# Patient Record
Sex: FEMALE | Race: WHITE | NOT HISPANIC OR LATINO
[De-identification: names, ages, dates, MRNs, and addresses within clinical notes are randomized per-mention and may not be internally consistent; named-entity substitution may affect disease eponyms.]

---

## 2017-09-17 ENCOUNTER — HEALTH MAINTENANCE LETTER (OUTPATIENT)
Age: 32
End: 2017-09-17

## 2019-03-06 ENCOUNTER — APPOINTMENT (RX ONLY)
Dept: URBAN - METROPOLITAN AREA CLINIC 303 | Facility: CLINIC | Age: 34
Setting detail: DERMATOLOGY
End: 2019-03-06

## 2019-03-06 DIAGNOSIS — B07.0 PLANTAR WART: ICD-10-CM

## 2019-03-06 DIAGNOSIS — L81.4 OTHER MELANIN HYPERPIGMENTATION: ICD-10-CM

## 2019-03-06 DIAGNOSIS — D22 MELANOCYTIC NEVI: ICD-10-CM

## 2019-03-06 DIAGNOSIS — L40.0 PSORIASIS VULGARIS: ICD-10-CM | Status: IMPROVED

## 2019-03-06 PROBLEM — E03.9 HYPOTHYROIDISM, UNSPECIFIED: Status: ACTIVE | Noted: 2019-03-06

## 2019-03-06 PROBLEM — M12.9 ARTHROPATHY, UNSPECIFIED: Status: ACTIVE | Noted: 2019-03-06

## 2019-03-06 PROBLEM — Z92.3 PERSONAL HISTORY OF IRRADIATION: Status: ACTIVE | Noted: 2019-03-06

## 2019-03-06 PROBLEM — D22.72 MELANOCYTIC NEVI OF LEFT LOWER LIMB, INCLUDING HIP: Status: ACTIVE | Noted: 2019-03-06

## 2019-03-06 PROBLEM — D22.5 MELANOCYTIC NEVI OF TRUNK: Status: ACTIVE | Noted: 2019-03-06

## 2019-03-06 PROCEDURE — ? ADDITIONAL NOTES

## 2019-03-06 PROCEDURE — ? COUNSELING

## 2019-03-06 PROCEDURE — 99214 OFFICE O/P EST MOD 30 MIN: CPT

## 2019-03-06 PROCEDURE — ? PRESCRIPTION MEDICATION MANAGEMENT

## 2019-03-06 PROCEDURE — ? PHOTO-DOCUMENTATION

## 2019-03-06 PROCEDURE — ? SUNSCREEN RECOMMENDATIONS

## 2019-03-06 ASSESSMENT — LOCATION DETAILED DESCRIPTION DERM
LOCATION DETAILED: INFERIOR THORACIC SPINE
LOCATION DETAILED: LEFT MEDIAL PLANTAR MIDFOOT
LOCATION DETAILED: RIGHT ELBOW
LOCATION DETAILED: LEFT PROXIMAL MEDIAL CALF
LOCATION DETAILED: LEFT ELBOW

## 2019-03-06 ASSESSMENT — LOCATION ZONE DERM
LOCATION ZONE: LEG
LOCATION ZONE: ARM
LOCATION ZONE: TRUNK
LOCATION ZONE: FEET

## 2019-03-06 ASSESSMENT — LOCATION SIMPLE DESCRIPTION DERM
LOCATION SIMPLE: UPPER BACK
LOCATION SIMPLE: RIGHT ELBOW
LOCATION SIMPLE: LEFT CALF
LOCATION SIMPLE: LEFT ELBOW
LOCATION SIMPLE: LEFT PLANTAR SURFACE

## 2019-03-06 NOTE — PROCEDURE: PHOTO-DOCUMENTATION
Photo Preface (Leave Blank If You Do Not Want): Photographs were obtained today
Detail Level: Detailed
Details (Free Text): 5mm triangular shaped brown macule

## 2019-03-06 NOTE — PROCEDURE: PRESCRIPTION MEDICATION MANAGEMENT
Render In Strict Bullet Format?: No
Continue Regimen: Salicylic acid 6% cream, Clobetasol .05% cream bid on elbows prn
Detail Level: Detailed

## 2020-06-17 ENCOUNTER — TRANSFERRED RECORDS (OUTPATIENT)
Dept: HEALTH INFORMATION MANAGEMENT | Facility: CLINIC | Age: 35
End: 2020-06-17

## 2020-06-17 LAB
HPV ABSTRACT: NORMAL
HPV ABSTRACT: NORMAL
PAP-ABSTRACT: NORMAL
PAP-ABSTRACT: NORMAL

## 2020-06-22 ENCOUNTER — TRANSFERRED RECORDS (OUTPATIENT)
Dept: HEALTH INFORMATION MANAGEMENT | Facility: CLINIC | Age: 35
End: 2020-06-22

## 2020-06-22 LAB
ALT SERPL-CCNC: 21 U/L
AST SERPL-CCNC: 35 U/L (ref 16–55)
CHOLEST SERPL-MCNC: 152 MG/DL
CREAT SERPL-MCNC: 0.91 MG/DL (ref 0.52–1.04)
GFR SERPL CREATININE-BSD FRML MDRD: >60 ML/MIN/1.73M2
GLUCOSE SERPL-MCNC: 86 MG/DL (ref 70–199)
HDLC SERPL-MCNC: 40 MG/DL (ref 40–60)
LDLC SERPL CALC-MCNC: 96 MG/DL
POTASSIUM SERPL-SCNC: 4.5 MMOL/L (ref 3.5–5.1)
TRIGL SERPL-MCNC: 80 MG/DL
TSH SERPL-ACNC: 3.31 MIU/L (ref 0.47–4.68)

## 2020-07-06 RX ORDER — CHLORAL HYDRATE 500 MG
1 CAPSULE ORAL DAILY
COMMUNITY

## 2020-07-06 RX ORDER — PRENATAL VIT/IRON FUM/FOLIC AC 27MG-0.8MG
1 TABLET ORAL DAILY
COMMUNITY
End: 2020-12-11

## 2020-07-06 RX ORDER — LEVOTHYROXINE SODIUM 100 UG/1
188 TABLET ORAL DAILY
COMMUNITY
End: 2020-10-14

## 2020-07-06 NOTE — PROGRESS NOTES
"Kaitlyn Paul is a 34 year old female who is being evaluated via a billable video visit.      The patient has been notified of following:     \"This video visit will be conducted via a call between you and your physician/provider. We have found that certain health care needs can be provided without the need for an in-person physical exam.  This service lets us provide the care you need with a video conversation.  If a prescription is necessary we can send it directly to your pharmacy.  If lab work is needed we can place an order for that and you can then stop by our lab to have the test done at a later time.    Video visits are billed at different rates depending on your insurance coverage.  Please reach out to your insurance provider with any questions.    If during the course of the call the physician/provider feels a video visit is not appropriate, you will not be charged for this service.\"    Patient has given verbal consent for Video visit? Yes    How would you like to obtain your AVS? Anthonyhart     Patient would like the video invitation sent by: Send to e-mail at: [email or web address] (**[email or web address]**)      Video-Visit Details    Type of service:  Video Visit    Distant Location (provider location):  [facility] ENDOCR[facility]     Hallie Jones MA          "

## 2020-07-07 ENCOUNTER — VIRTUAL VISIT (OUTPATIENT)
Dept: ENDOCRINOLOGY | Facility: CLINIC | Age: 35
End: 2020-07-07
Payer: COMMERCIAL

## 2020-07-07 DIAGNOSIS — C73 PAPILLARY THYROID CARCINOMA (H): Primary | ICD-10-CM

## 2020-07-07 DIAGNOSIS — E89.0 POST-SURGICAL HYPOTHYROIDISM: ICD-10-CM

## 2020-07-07 NOTE — LETTER
Date:July 8, 2020      Patient was self referred, no letter generated. Do not send.        [facility] Health Information

## 2020-07-07 NOTE — PROGRESS NOTES
Endocrine Consult Video visit note- precharting    Attending Assessment/Plan :     1.  Papillary thyroid carcinoma, 3.2 cm left with ETE, + 5 LN positive. She has been treated with near total Tx and CND, 103 mCi 131.    Neck US to be done at [facility] - could be done at the same time as the labs in early Sept    Addendum   9/14/2020 neck US as reviewed by me:   Left level 4 low 0.6 x 0.5 x 0.6 cm     2.  Post surgical hypothyroidism.  Treat to target TSH < 0.4.   Unstable.  Recent increased LT4 dose from 175 to 188 mcg/day.  Repeat labs early September, 2020    Review of the EMR.  She did not get the early Sept labs as advised.  Instead we have the folloing  7/24/2020 [place]  TSH 1.567    3   Post partum from miscarriage within the last 6 months.  I have counseled her to let us know immediately if she conceives again.      Due to the COVID 19 pandemic this visit was a telephone /video visit in order to help prevent spread of infection in this high risk patient and the general population. The patient gave verbal consent for the visit today.    Start time 1805  Stop time 1822  Total time 17 minutes     Chief complaint:  Kaitlyn is a 34 year old female seen in consultation at the request of Dr Martina Pryor (endocrinologist, ) and Mariya Luong  For thyroid cancer. I have reviewed Care Everywhere including  [facility] and [facility] lab reports, imaging reports and provider notes as indicated.      HISTORY OF PRESENT ILLNESS  She has previously been getting thyroid cancer care by Dr Paulo Pryor  in [place], [place] at [facility]. She recently moved to [place].      The thyroid cancer treatment has been as follows:   2/4/14 Near total thyroidectomy with central lymph node dissection by Dr Sandra Mims at .  Surgical path showed 3.2 cm PTC left with ETE but no LV invasion.  5/21 LN were positive , larges tumor deposit in LN 0.4 cm.      4/11/14 103 mCi 131I  The most recent labs were obtained in late June in [place], just  before she moved here. IN response to the TSH of 2.0, the LT4 dose increased from 175 to 188 (1q00 + 88 mcg) one week ago.    We have the following labs  1/30/14 25OHD 18, PTH 29, Ca 9.4,   2/4/14 PTH 11  2/6/14 PTH 14  2/20/14: PTH 14, Ca 9.5  4/11/14: Tg 1.8, LOR < 1, .7, PTH 42  Radioiodine 103 mCi  12/4/14: Tg 0.3, LOR < 1, TSH 21.8  8/1/16: Tg < 0.1, TSH 0.44  4/29/17: Tg 0.1, LOR < 1, TSH 0.27  9/14/18 Tg < 0.1, LOR < 1, TPO 32  3/29/19: 25OHD 36  9/6/19 Tg < 0.1, LOR < 1, TSH 0.23, free  T4 2.03  9/17/19 TSH 0.28  6/22/20 TSH 3.31  6/24/2020 Tg 0.1 (Access), LOR < 1 (Access), TSH 2.01, free T4 1.73    Imaging  12/11/13 CT Doc  12/20/13 US neck  4/11/14 103 mCi 131I  4/21/14 131I post therapy TBS: negative  12/4/14 4.06 mCi 123I TBS: negative  8/23/16 US neck  8/14/17 US thyroid  8/20/18 US neck   8/13/19 thyroid US [facility]    REVIEW OF SYSTEMS  Miscarriage x 4 in 2 years-- most recent 1/2020  Currently not trying to conceive - not using birth contorl.    10 system ROS otherwise as per the HPI or negative    Past Medical History  Past Medical History:   Diagnosis Date     Dry eye      Miscarriage     x 4     Papillary thyroid carcinoma (H) 02/2014     Post-surgical hypothyroidism 02/2014     Past Surgical History:   Procedure Laterality Date     C NONSPECIFIC PROCEDURE  2007    lipoma excision from her back     C REPAIR CRUCIATE LIGAMENT,KNEE  1999    left     central neck dissection  02/04/2014     HC KNEE SCOPE,MED/LAT MENISECTOMY  2009     HC REMOVAL GALLBLADDER  2005    lap      near total thyroidectomy  02/04/2014       Medications  Current Outpatient Medications   Medication Sig Dispense Refill     levothyroxine (SYNTHROID/LEVOTHROID) 100 MCG tablet Take 188 mcg by mouth daily        Omega-3 1000 MG capsule Take 1 g by mouth daily       Prenatal Vit-Fe Fumarate-FA (PRENATAL MULTIVITAMIN W/IRON) 27-0.8 MG tablet Take 1 tablet by mouth daily         Allergies  Allergies   Allergen Reactions      Sulfa Drugs      Rash as a child       Family History  family history includes Cardiovascular in her maternal grandfather, maternal grandmother, and paternal grandfather; Cerebrovascular Disease in her maternal grandmother; Gastrointestinal Disease in her paternal grandmother; Graves' disease in her mother.    Social History  Social History     Tobacco Use     Smoking status: Never Smoker   Substance Use Topics     Alcohol use: No     Drug use: No     Just moved here from [place]    Physical Exam  There were no vitals taken for this visit.  There is no height or weight on file to calculate BMI.  GENERAL :  In no apparent distress  EYES: Eyes grossly normal to inspection  RESP: No audible wheeze, cough, or visible cyanosis.  No visible retractions or increased work of breathing.    SKIN: Visible skin clear. No significant rash, abnormal pigmentation or lesions.  NEURO: Cranial nerves grossly intact.  Mentation and speech appropriate .  PSYCH: Mentation appears normal, affect normal/bright, judgement and insight intact, normal speech and appearance well-groomed.     DATA REVIEW    ADDENDUM ADDENDED REPORT (02/10/14): THIS ADDENDUM REPORT SUPERSEDES THE REPORT ISSUED 02/04/14. THE PURPOSE OF THIS ADDENDUM IS TO ADD THE FROZEN SECTION DIAGNOSIS.  THE FINAL DIAGNOSIS IS UNCHANGED.   FINAL DIAGNOSIS  A) Parathyroid, right, excisional biopsy:  - Thyroid tissue, no parathyroid present  B) Thyroid and lymph nodes, total thyroidectomy and central neck dissection:  - Papillary thyroid carcinoma of left lobe, maximum dimension 3.2 cm  - Surgical margins free of tumor; but tumor within 0.1 cm from inked margins  - Tumor does not spread to right lobe or isthmus  - Extrathyroid extension present  - Lymphovascular invasion not present  - Five of 21 lymph nodes positive for carcinoma (5/21);    largest tumor deposit 0.4 cm with no extranodal extension  - Remaining thyroid parenchyma with lymphocytic thyroiditis  - Please see  "microscopic description for synoptic report    CLINICAL HISTORY  28 year-old female with papillary thyroid carcinoma.    MICROSCOPIC DESCRIPTION  Synoptic Report:    Procedure: Total thyroidectomy  Received: In formalin  Specimen Integrity: Intact  Specimen Size:   Right lobe: 4.6 x 2.2 x 1.1 cm   Left lobe: 5.1 x 2.5 x 1.8 cm   Isthmus: 1.3 x 1.4 x 0.4 cm   Central compartment: 3.7 x 2.4 x 0.5 cm  Specimen Weight: 23g    Tumor Focality: Unifocal   Tumor Laterality: Left Lobe  Tumor Size   Greatest dimension: 3.2 cm   Additional dimensions: 1.6 x 1.6 cm  Histologic Type: Papillary carcinoma, Classical  Architecture: Classical  Cytomorphology: Classical  Histologic Grade:  G1: Well differentiated  Margins: Margins uninvolved by carcinoma  Distance of invasive carcinoma to closest margin: less than 1 mm  Tumor Capsule: None  Lymph-Vascular Invasion: Not identified  Perineural Invasion: Not identified  Extrathyroidal Extension: Present    Pathologic Staging (pTNM)  Primary Tumor (pT)   pT3: Focal extrathyroid extension  Regional Lymph Nodes (pN)   pN1: Positive   Number examined: 21   Number involved: 5   Largest size of tumor deposit: 0.4 cm   Extranodal extension: Not present  Distant Metastasis (pM): Not applicable    Additional Pathologic Findings: Lymphocytic thyroiditis  GROSS DESCRIPTION  A) The specimen is received fresh for intraoperative consultation labeled \"Kaitlyn Paul/right parathyroid\" and consists of one fragment of red-tan tissue measuring 0.2 x 0.1 x 0.1 cm. The entirety of the tissue is submitted for frozen section analysis which was interpreted as thyroid tissue. The entirety of the frozen section remnants are submitted for permanent analysis in cassette (FSA1).    B) The specimen is received in formalin labeled \"Kaitlyn Paul/thyroid and central neck dissection\" and consists of one thyroid measuring 6.0 x 5.3 x 1.8 cm. The right lobe measures 4.6 x 2.2 x 1.1 cm. The left lobe " measures 5.1 x 2.5 x 1.8 cm. The isthmus measures 1.3 x 1.4 x 0.4 cm. The left lobe appears to have a dominant cystic cavity measuring approximately 2.8 x 2.4 x 1.8 cm. No nodules are externally appreciated on the right lobe. The thyroid has stitches; however it is not indicated what the stitches designate. The thyroid weighs 23 g. The anterior is inked in green and the posterior is inked in purple. The thyroid is serially sectioned from right to left revealing homogenous dark red thyroid parenchyma within the right lobe. The isthmus was devoid of gross abnormality. The left lobe demonstrated an ill-defined nodule measuring approximately 3.2 x 1.6 by 1.6 cm. This nodule contains what appear to be thick hemorrhagic cavities as well as darker firm tan areas. There does not appear to be a capsule around the nodule or any part of it. The nodule is extremely friable. Representative sections of the right lobe and isthmus are submitted. The entirety of the left lobe   nodule is submitted. There is also a piece of red-tan soft tissue measuring 3.7 x 2.4 x 0.5 cm. There appears to be made up of lymph nodes and fibroconnective tissue. The tissue is thoroughly searched for lymph nodes and submitted in its entirety.    (B1-B4) Representative right lobe  (B5) Isthmus  (B6-B11) Entirety of left lobe nodule  (B12) Uninvolved left lobe parenchyma  (B13) 3 presumptive lymph nodes whole  (B14) 2 presumptive lymph nodes, whole  (B15) 3 presumptive lymph nodes, whole  (B16) 3 presumptive lymph nodes, whole  (B17) 2 presumptive lymph nodes, whole  LJP    INTRAOPERATIVE DIAGNOSIS  (FSA1) Right parathyroid, biopsy: thyroid tissue  Intra-Operative Diagnosis Reviewed and Interpreted By:  MD Ameena Henry MD   'I certify that (1) all services on this form were rendered and are hereby approved for billing, (2) all specimens/slides have been examined / reviewed, (3) the medical record has been documented for these  services, and (4) the rendering of the services and the documentation in the medical record are in accordance with UPI guidelines.'  Final Diagnosis Reviewed and Interpreted by:  ELECTRONICALLY SIGNED  Yareli William MD, PhD  Benjamin Mera MD   Pathologist - [id number]  Resident   Date:  02/10/2014  Surgical Pathology  [facility] Department of Pathology  [phone] (Phone)   [phone] (Fax)    EXAMINATION: ULTRASOUND THYROID    DATE: 8/13/2019.    INDICATION: Any concerning mass or lymph node?  History of papillary thyroid cancer, status post thyroidectomy.    TECHNIQUE: Grayscale and color Doppler images of the neck were obtained.    COMPARISON: Prior ultrasound on 8/20/2018.    FINDINGS:    The thyroid gland is surgically absent. No abnormal soft tissue or nodularity is noted in the thyroidectomy beds.    Several small benign lymph nodes are seen along the bilateral neck chains.    Exam: US HEAD NECK SOFT TISSUE, 9/16/2020     Indication: Papillary thyroid carcinoma (H) [C73 (ICD-10-CM)];  Post-surgical hypothyroidism     Comparison: None     Findings:   Post-thyroidectomy. A 4 x 7  mm hyperechogenic area in the left  thyroidectomy bed, likely represents residual thyroid tissue versus  postsurgical scarring.     Largest lymph nodes in the neck as described below;  *  1.3 x 0.7 x 2.3 cm ovoid right level 2 lymph nodes with visible  fatty hilum, favored as reactive.   *  1.4 x 0.5 x 2.1 cm ovoid left level 2 lymph node.  *  0.6 x 0.5 x 0.6 cm ovoid left level 4 lymph node.                                                                      Impression:   1. A 4 x 7  mm hyperechogenic area in the left thyroidectomy bed,  likely represents postsurgical changes versus fatty-replaced lymph  node. No suspicious nodules or lymph nodes in the thyroidectomy bed.  2. Scattered cervical lymph nodes as noted above, likely reactive. No  suspicious lymph node is noted. Attention on follow-up.     I have personally  reviewed the examination and initial interpretation  and I agree with the findings.     ZEFERINO HENDRIX MD

## 2020-07-07 NOTE — LETTER
"7/7/2020       RE: Kaitlyn Paul  [address] [place] [id number]     Dear Colleague,    Thank you for referring your patient, Kaitlyn Paul, to the [facility] ENDOCRINOLOGY at [facility]. Please see a copy of my visit note below.    Kaitlyn Paul is a 34 year old female who is being evaluated via a billable video visit.      The patient has been notified of following:     \"This video visit will be conducted via a call between you and your physician/provider. We have found that certain health care needs can be provided without the need for an in-person physical exam.  This service lets us provide the care you need with a video conversation.  If a prescription is necessary we can send it directly to your pharmacy.  If lab work is needed we can place an order for that and you can then stop by our lab to have the test done at a later time.    Video visits are billed at different rates depending on your insurance coverage.  Please reach out to your insurance provider with any questions.    If during the course of the call the physician/provider feels a video visit is not appropriate, you will not be charged for this service.\"    Patient has given verbal consent for Video visit? Yes    How would you like to obtain your AVS? MyChart     Patient would like the video invitation sent by: Send to e-mail at: [email or web address] (**Amwell**)      Video-Visit Details    Type of service:  Video Visit    Distant Location (provider location):  [facility] ENDOCRINOLOGY     Hallie Jones MA            Endocrine Consult Video visit note- precharting    Attending Assessment/Plan :     1.  Papillary thyroid carcinoma, 3.2 cm left with ETE, + 5 LN positive. She has been treated with near total Tx and CND, 103 mCi 131.    Neck US to be done at [facility] - could be done at the same time as the labs in early Sept    2.  Post surgical hypothyroidism.  Treat to target TSH < " 0.4.   Unstable.  Recent increased LT4 dose from 175 to 188 mcg/day.  Repeat labs early September, 2020    3   Post partum from miscarriage within the last 6 months.  I have counseled her to let us know immediately if she conceives again.      Due to the COVID 19 pandemic this visit was a telephone /video visit in order to help prevent spread of infection in this high risk patient and the general population. The patient gave verbal consent for the visit today.    Start time 1805  Stop time 1822  Total time 17 minutes     Chief complaint:  Kaitlyn is a 34 year old female seen in consultation at the request of Dr Martina Pryor (endocrinologist, ) and Mariya Luong  For thyroid cancer. I have reviewed Care Everywhere including  [facility] and [facility] lab reports, imaging reports and provider notes as indicated.      HISTORY OF PRESENT ILLNESS  She has previously been getting thyroid cancer care by Dr Paulo Pryor  in [place], [place] at [facility]. She recently moved to [place].      The thyroid cancer treatment has been as follows:   2/4/14 Near total thyroidectomy with central lymph node dissection by Dr Sandra Mims at .  Surgical path showed 3.2 cm PTC left with ETE but no LV invasion.  5/21 LN were positive , larges tumor deposit in LN 0.4 cm.      4/11/14 103 mCi 131I  The most recent labs were obtained in late June in [place], just before she moved here. IN response to the TSH of 2.0, the LT4 dose increased from 175 to 188 (1q00 + 88 mcg) one week ago.    We have the following labs  1/30/14 25OHD 18, PTH 29, Ca 9.4,   2/4/14 PTH 11  2/6/14 PTH 14  2/20/14: PTH 14, Ca 9.5  4/11/14: Tg 1.8, LOR < 1, .7, PTH 42  Radioiodine 103 mCi  12/4/14: Tg 0.3, LOR < 1, TSH 21.8  8/1/16: Tg < 0.1, TSH 0.44  4/29/17: Tg 0.1, LOR < 1, TSH 0.27  9/14/18 Tg < 0.1, LOR < 1, TPO 32  3/29/19: 25OHD 36  9/6/19 Tg < 0.1, LOR < 1, TSH 0.23, free  T4 2.03  9/17/19 TSH 0.28  6/22/20 TSH 3.31  6/24/2020 Tg 0.1 (Access), LOR < 1  (Access), TSH 2.01, free T4 1.73    Imaging  12/11/13 CT Doc  12/20/13 US neck  4/11/14 103 mCi 131I  4/21/14 131I post therapy TBS: negative  12/4/14 4.06 mCi 123I TBS: negative  8/23/16 US neck  8/14/17 US thyroid  8/20/18 US neck   8/13/19 thyroid US [facility]    REVIEW OF SYSTEMS  Miscarriage x 4 in 2 years-- most recent 1/2020  Currently not trying to conceive - not using birth contorl.    10 system ROS otherwise as per the HPI or negative    Past Medical History  Past Medical History:   Diagnosis Date     Dry eye      Miscarriage     x 4     Papillary thyroid carcinoma (H) 02/2014     Post-surgical hypothyroidism 02/2014     Past Surgical History:   Procedure Laterality Date     C NONSPECIFIC PROCEDURE  2007    lipoma excision from her back     C REPAIR CRUCIATE LIGAMENT,KNEE  1999    left     central neck dissection  02/04/2014     HC KNEE SCOPE,MED/LAT MENISECTOMY  2009     HC REMOVAL GALLBLADDER  2005    lap      near total thyroidectomy  02/04/2014       Medications  Current Outpatient Medications   Medication Sig Dispense Refill     levothyroxine (SYNTHROID/LEVOTHROID) 100 MCG tablet Take 188 mcg by mouth daily        Omega-3 1000 MG capsule Take 1 g by mouth daily       Prenatal Vit-Fe Fumarate-FA (PRENATAL MULTIVITAMIN W/IRON) 27-0.8 MG tablet Take 1 tablet by mouth daily         Allergies  Allergies   Allergen Reactions     Sulfa Drugs      Rash as a child       Family History  family history includes Cardiovascular in her maternal grandfather, maternal grandmother, and paternal grandfather; Cerebrovascular Disease in her maternal grandmother; Gastrointestinal Disease in her paternal grandmother; Graves' disease in her mother.    Social History  Social History     Tobacco Use     Smoking status: Never Smoker   Substance Use Topics     Alcohol use: No     Drug use: No     Just moved here from [place]    Physical Exam  There were no vitals taken for this visit.  There is no height or weight on file to  calculate BMI.  GENERAL :  In no apparent distress  EYES: Eyes grossly normal to inspection  RESP: No audible wheeze, cough, or visible cyanosis.  No visible retractions or increased work of breathing.    SKIN: Visible skin clear. No significant rash, abnormal pigmentation or lesions.  NEURO: Cranial nerves grossly intact.  Mentation and speech appropriate .  PSYCH: Mentation appears normal, affect normal/bright, judgement and insight intact, normal speech and appearance well-groomed.     DATA REVIEW    ADDENDUM ADDENDED REPORT (02/10/14): THIS ADDENDUM REPORT SUPERSEDES THE REPORT ISSUED 02/04/14. THE PURPOSE OF THIS ADDENDUM IS TO ADD THE FROZEN SECTION DIAGNOSIS.  THE FINAL DIAGNOSIS IS UNCHANGED.   FINAL DIAGNOSIS  A) Parathyroid, right, excisional biopsy:  - Thyroid tissue, no parathyroid present  B) Thyroid and lymph nodes, total thyroidectomy and central neck dissection:  - Papillary thyroid carcinoma of left lobe, maximum dimension 3.2 cm  - Surgical margins free of tumor; but tumor within 0.1 cm from inked margins  - Tumor does not spread to right lobe or isthmus  - Extrathyroid extension present  - Lymphovascular invasion not present  - Five of 21 lymph nodes positive for carcinoma (5/21);    largest tumor deposit 0.4 cm with no extranodal extension  - Remaining thyroid parenchyma with lymphocytic thyroiditis  - Please see microscopic description for synoptic report    CLINICAL HISTORY  28 year-old female with papillary thyroid carcinoma.    MICROSCOPIC DESCRIPTION  Synoptic Report:    Procedure: Total thyroidectomy  Received: In formalin  Specimen Integrity: Intact  Specimen Size:   Right lobe: 4.6 x 2.2 x 1.1 cm   Left lobe: 5.1 x 2.5 x 1.8 cm   Isthmus: 1.3 x 1.4 x 0.4 cm   Central compartment: 3.7 x 2.4 x 0.5 cm  Specimen Weight: 23g    Tumor Focality: Unifocal   Tumor Laterality: Left Lobe  Tumor Size   Greatest dimension: 3.2 cm   Additional dimensions: 1.6 x 1.6 cm  Histologic Type: Papillary  "carcinoma, Classical  Architecture: Classical  Cytomorphology: Classical  Histologic Grade:  G1: Well differentiated  Margins: Margins uninvolved by carcinoma  Distance of invasive carcinoma to closest margin: less than 1 mm  Tumor Capsule: None  Lymph-Vascular Invasion: Not identified  Perineural Invasion: Not identified  Extrathyroidal Extension: Present    Pathologic Staging (pTNM)  Primary Tumor (pT)   pT3: Focal extrathyroid extension  Regional Lymph Nodes (pN)   pN1: Positive   Number examined: 21   Number involved: 5   Largest size of tumor deposit: 0.4 cm   Extranodal extension: Not present  Distant Metastasis (pM): Not applicable    Additional Pathologic Findings: Lymphocytic thyroiditis  GROSS DESCRIPTION  A) The specimen is received fresh for intraoperative consultation labeled \"Kaitlyn Paul/right parathyroid\" and consists of one fragment of red-tan tissue measuring 0.2 x 0.1 x 0.1 cm. The entirety of the tissue is submitted for frozen section analysis which was interpreted as thyroid tissue. The entirety of the frozen section remnants are submitted for permanent analysis in cassette (FSA1).    B) The specimen is received in formalin labeled \"Kaitlyn Paul/thyroid and central neck dissection\" and consists of one thyroid measuring 6.0 x 5.3 x 1.8 cm. The right lobe measures 4.6 x 2.2 x 1.1 cm. The left lobe measures 5.1 x 2.5 x 1.8 cm. The isthmus measures 1.3 x 1.4 x 0.4 cm. The left lobe appears to have a dominant cystic cavity measuring approximately 2.8 x 2.4 x 1.8 cm. No nodules are externally appreciated on the right lobe. The thyroid has stitches; however it is not indicated what the stitches designate. The thyroid weighs 23 g. The anterior is inked in green and the posterior is inked in purple. The thyroid is serially sectioned from right to left revealing homogenous dark red thyroid parenchyma within the right lobe. The isthmus was devoid of gross abnormality. The left lobe " demonstrated an ill-defined nodule measuring approximately 3.2 x 1.6 by 1.6 cm. This nodule contains what appear to be thick hemorrhagic cavities as well as darker firm tan areas. There does not appear to be a capsule around the nodule or any part of it. The nodule is extremely friable. Representative sections of the right lobe and isthmus are submitted. The entirety of the left lobe   nodule is submitted. There is also a piece of red-tan soft tissue measuring 3.7 x 2.4 x 0.5 cm. There appears to be made up of lymph nodes and fibroconnective tissue. The tissue is thoroughly searched for lymph nodes and submitted in its entirety.    (B1-B4) Representative right lobe  (B5) Isthmus  (B6-B11) Entirety of left lobe nodule  (B12) Uninvolved left lobe parenchyma  (B13) 3 presumptive lymph nodes whole  (B14) 2 presumptive lymph nodes, whole  (B15) 3 presumptive lymph nodes, whole  (B16) 3 presumptive lymph nodes, whole  (B17) 2 presumptive lymph nodes, whole  LJP    INTRAOPERATIVE DIAGNOSIS  (FSA1) Right parathyroid, biopsy: thyroid tissue  Intra-Operative Diagnosis Reviewed and Interpreted By:  MD Ameena Henry MD   'I certify that (1) all services on this form were rendered and are hereby approved for billing, (2) all specimens/slides have been examined / reviewed, (3) the medical record has been documented for these services, and (4) the rendering of the services and the documentation in the medical record are in accordance with UPI guidelines.'  Final Diagnosis Reviewed and Interpreted by:  ELECTRONICALLY SIGNED  Yareli William MD, PhD  Benjamin Mera MD   Pathologist - [id number]  Resident   Date:  02/10/2014  Surgical Pathology  [facility] ([place], [place]) Department of Pathology  [phone] (Phone)   [phone] (Fax)    EXAMINATION: ULTRASOUND THYROID    DATE: 8/13/2019.    INDICATION: Any concerning mass or lymph node?  History of papillary thyroid cancer, status post  thyroidectomy.    TECHNIQUE: Grayscale and color Doppler images of the neck were obtained.    COMPARISON: Prior ultrasound on 8/20/2018.    FINDINGS:    The thyroid gland is surgically absent. No abnormal soft tissue or nodularity is noted in the thyroidectomy beds.    Several small benign lymph nodes are seen along the bilateral neck chains.    Again, thank you for allowing me to participate in the care of your patient.      Sincerely,    Ghislaine Streeter MD

## 2020-07-20 ENCOUNTER — TELEPHONE (OUTPATIENT)
Dept: ENDOCRINOLOGY | Facility: CLINIC | Age: 35
End: 2020-07-20

## 2020-07-20 DIAGNOSIS — C73 PAPILLARY THYROID CARCINOMA (H): Primary | ICD-10-CM

## 2020-07-20 DIAGNOSIS — E89.0 POST-SURGICAL HYPOTHYROIDISM: ICD-10-CM

## 2020-07-20 DIAGNOSIS — Z33.1 PREGNANCY, INCIDENTAL: ICD-10-CM

## 2020-07-20 NOTE — TELEPHONE ENCOUNTER
[facility] Call Center    Phone Message    May a detailed message be left on voicemail: yes     Reason for Call: pt just found out that she is pregnant. Dr. Streeter wanted to to be told immediately. Please call pt if needed. Thank you.    Action Taken: Message sent to:  Endocrine clinic    Travel Screening: Not Applicable

## 2020-07-20 NOTE — TELEPHONE ENCOUNTER
[facility] Call Center    Phone Message    May a detailed message be left on voicemail: yes     Reason for Call: pt requesting lab order to be mailed or fax because she is out of town for a few weeks in [place]. Pt does not know where a lab is located (will look up). Please call pt back to discuss. Writer confirmed mailing address   [address] [place] [phone]    Action Taken: Message routed to:  [facility] endo    Travel Screening: Not Applicable

## 2020-07-21 NOTE — TELEPHONE ENCOUNTER
Lab slip emailed to Dr Streeter  To sign. Will mail to home address as no other fax number was given Jana Ponce RN on 7/21/2020 at 4:46 PM

## 2020-07-22 NOTE — TELEPHONE ENCOUNTER
I have corrected the hand written lab slip that was sent to me to sign.    Please note to patient that this is all a set up for error and significant delays in reporting.  I highly recommend she get the labs testing in the [facility] system.  Ghislaine Streeter MD

## 2020-07-22 NOTE — TELEPHONE ENCOUNTER
I left Kaitlyn a message that we cannot mail her a lab slip since the labs need to be done at a [facility] [place] location for accuracy. Jana Ponce RN on 7/22/2020 at 12:24 PM

## 2020-07-24 ENCOUNTER — TELEPHONE (OUTPATIENT)
Dept: ENDOCRINOLOGY | Facility: CLINIC | Age: 35
End: 2020-07-24

## 2020-07-24 ENCOUNTER — TRANSFERRED RECORDS (OUTPATIENT)
Dept: HEALTH INFORMATION MANAGEMENT | Facility: CLINIC | Age: 35
End: 2020-07-24

## 2020-07-24 NOTE — TELEPHONE ENCOUNTER
Spoke w/ Pt: Pregnant; had TSH tested at OB; not in [place], will not be in [place] for several weeks. Staying in [place] with Family until late next month. Pt Will have TSH result faxed to clinic. Fax number provided.   Asking if she can wait for other Thy orders to be drawn or if we should fax to local clinic in [place]. Sent to provider for review.   Would like follow up appt with Dr Streeter after her US in Sept. Sent to [facility] for scheduling.   Mindy Chavez RN on 7/24/2020 at 1:57 PM     PLEASE HELP SCHEDULE THE FOLLOWING APPT(S): Return Appointment    TIME FRAME NEEDED BY: Other (specify in comments.)       SCHEDULING COMMENTS (optional): after US in Sept. With Dr Streeter.

## 2020-07-24 NOTE — TELEPHONE ENCOUNTER
I will comment on lab data if and when I receive it.  We already warned her that getting these labs elsewhere could result in delays.  She should advocate at the level of the performing lab to get us the results.  Beyond this, I am not sure what the question is?  Ghislaine Streeter MD

## 2020-09-14 ENCOUNTER — ANCILLARY PROCEDURE (OUTPATIENT)
Dept: ULTRASOUND IMAGING | Facility: CLINIC | Age: 35
End: 2020-09-14
Payer: COMMERCIAL

## 2020-09-14 DIAGNOSIS — C73 PAPILLARY THYROID CARCINOMA (H): ICD-10-CM

## 2020-09-14 DIAGNOSIS — E89.0 POST-SURGICAL HYPOTHYROIDISM: ICD-10-CM

## 2020-10-12 DIAGNOSIS — C73 PAPILLARY THYROID CARCINOMA (H): ICD-10-CM

## 2020-10-12 DIAGNOSIS — E89.0 POST-SURGICAL HYPOTHYROIDISM: ICD-10-CM

## 2020-10-12 DIAGNOSIS — Z33.1 PREGNANCY, INCIDENTAL: ICD-10-CM

## 2020-10-12 LAB
T4 FREE SERPL-MCNC: 1.51 NG/DL (ref 0.76–1.46)
T4 SERPL-MCNC: 15 UG/DL (ref 4.5–13.9)
TSH SERPL DL<=0.005 MIU/L-ACNC: 0.21 MU/L (ref 0.4–4)

## 2020-10-12 PROCEDURE — 86800 THYROGLOBULIN ANTIBODY: CPT | Mod: 90 | Performed by: PATHOLOGY

## 2020-10-12 PROCEDURE — 84443 ASSAY THYROID STIM HORMONE: CPT | Performed by: PATHOLOGY

## 2020-10-12 PROCEDURE — 84439 ASSAY OF FREE THYROXINE: CPT | Performed by: PATHOLOGY

## 2020-10-12 PROCEDURE — 99000 SPECIMEN HANDLING OFFICE-LAB: CPT | Performed by: PATHOLOGY

## 2020-10-13 NOTE — PROGRESS NOTES
"Kaitlyn Paul is a 35 year old female who is being evaluated via a billable video visit.      The patient has been notified of following:     \"This video visit will be conducted via a call between you and your physician/provider. We have found that certain health care needs can be provided without the need for an in-person physical exam.  This service lets us provide the care you need with a video conversation.  If a prescription is necessary we can send it directly to your pharmacy.  If lab work is needed we can place an order for that and you can then stop by our lab to have the test done at a later time.    Video visits are billed at different rates depending on your insurance coverage.  Please reach out to your insurance provider with any questions.    If during the course of the call the physician/provider feels a video visit is not appropriate, you will not be charged for this service.\"    Patient has given verbal consent for Video visit? Yes    How would you like to obtain your AVS? MyChart    Will anyone else be joining your video visit? No      Video-Visit Details    Type of service:  Video Visit    Distant Location (provider location):  [facility] ENDOCRINOLOGY CLINIC SAVITA Jones MA          "

## 2020-10-14 ENCOUNTER — VIRTUAL VISIT (OUTPATIENT)
Dept: ENDOCRINOLOGY | Facility: CLINIC | Age: 35
End: 2020-10-14
Payer: COMMERCIAL

## 2020-10-14 DIAGNOSIS — E89.0 POST-SURGICAL HYPOTHYROIDISM: ICD-10-CM

## 2020-10-14 DIAGNOSIS — C73 PAPILLARY THYROID CARCINOMA (H): Primary | ICD-10-CM

## 2020-10-14 PROCEDURE — 99214 OFFICE O/P EST MOD 30 MIN: CPT | Mod: 95

## 2020-10-14 RX ORDER — LEVOTHYROXINE SODIUM 88 UG/1
88 TABLET ORAL DAILY
Qty: 90 TABLET | Refills: 4 | Status: SHIPPED | OUTPATIENT
Start: 2020-10-14 | End: 2021-11-03 | Stop reason: DRUGHIGH

## 2020-10-14 RX ORDER — LEVOTHYROXINE SODIUM 100 UG/1
100 TABLET ORAL DAILY
COMMUNITY
Start: 2020-10-14 | End: 2020-10-14

## 2020-10-14 RX ORDER — LEVOTHYROXINE SODIUM 100 UG/1
100 TABLET ORAL DAILY
Qty: 90 TABLET | Refills: 4 | Status: SHIPPED | OUTPATIENT
Start: 2020-10-14 | End: 2021-11-03 | Stop reason: DRUGHIGH

## 2020-10-14 RX ORDER — LEVOTHYROXINE SODIUM 88 UG/1
88 TABLET ORAL DAILY
COMMUNITY
Start: 2020-10-14 | End: 2020-10-14

## 2020-10-14 NOTE — LETTER
Date:October 16, 2020      Patient was self referred, no letter generated. Do not send.        [facility] Health Information

## 2020-10-14 NOTE — PATIENT INSTRUCTIONS
Repeat Ultrasound in September, 2021 and see me afterwards    You have standing orders for yearly thyroid labs - should be drawn a few weeks prior to the next appointment.

## 2020-10-14 NOTE — PROGRESS NOTES
Endocrine Consult Video visit note-     Attending Assessment/Plan :     1.  Papillary thyroid carcinoma, 3.2 cm left with ETE, + 5 LN positive. She has been treated with near total Tx and CND, 103 mCi 131.    Tg pending.  Discussed  Repeat US in one year - prior to next visit    Addendum: 10/12/2020 Tg < 0.1, LOR < 0.4   7/27/2021 Tg < 0.1, OLR < 0.4, TSH 0.47, free T4 1.25.     Addendum 9/27/2021 disconnected labs and follow up appt  9/24/2021 neck US: compared with  9/14/2020 neck US as read by me:   Left level 4 #1  0.7 x 0.5 x 0.6 cm  was 0.6 x 0.5 x 0.6 cm     2.  Post surgical hypothyroidism.  Treat to target TSH < 0.4.   Current TSH at target on LT4 188 mc/gday.  Rx refilled    3.  Recurrent miscarriage with blighted ovum since last visit.  Discussed    Due to the COVID 19 pandemic this visit was avideo visit in order to help prevent spread of infection in this high risk patient and the general population. The patient gave verbal consent for the visit today.    Start time  1521  Stop time 1538  Total time  17    Chief complaint: HISTORY OF PRESENT ILLNESS    Kaitlyn presents for follow up of papillary thyroid carcinoma, post surgical hypothyroidism.  She also has history of recurrent miscarriage.  I have seen her once before on 7/7/2020.  At that time there had been a dose change, she was not in steady state .    She had recently moved to [place], having  previously gotten  thyroid cancer care by Dr Paulo Pryor  in [place], [place] at [facility].  I have seen her once before on 7/7/2020.  At that time there had been a dose change, she was not in steady state .  Labs were to be done in September and I had recommended clinic follow up in a year, so she presents today 9 months prior to expectation .  Since our lst meeting she has conceived. Pregnancy-thyroid related standing orders are already on the system and I see were most recently drawn yesterday with TSH 0.21, total T4 15, free T4 1.51.  The  Tg is pending.  Today  Kaitlyn says she is here for her annual thyroid cancer follow up. She did not see the July visit as meeting this definition.  She has questions about the labs performed 10/12 and the US from September.       The thyroid cancer treatment has been as follows:   2/4/14 Near total thyroidectomy with central lymph node dissection by Dr Sandra Mims at .  Surgical path showed 3.2 cm PTC left with ETE but no LV invasion.  5/21 LN were positive , larges tumor deposit in LN 0.4 cm.      4/11/14 103 mCi 131I  The most recent labs were obtained in late June in [place], just before she moved here. IN response to the TSH of 2.0, the LT4 dose increased from 175 to 188 (1q00 + 88 mcg) one week ago.    We have the following labs  1/30/14 25OHD 18, PTH 29, Ca 9.4,   2/4/14 PTH 11  2/6/14 PTH 14  2/20/14: PTH 14, Ca 9.5  4/11/14: Tg 1.8, LOR < 1, .7, PTH 42  Radioiodine 103 mCi  12/4/14: Tg 0.3, LOR < 1, TSH 21.8  8/1/16: Tg < 0.1, TSH 0.44  4/29/17: Tg 0.1, LOR < 1, TSH 0.27  9/14/18 Tg < 0.1, LOR < 1, TPO 32  3/29/19: 25OHD 36  9/6/19 Tg < 0.1, LOR < 1, TSH 0.23, free  T4 2.03  9/17/19 TSH 0.28  6/22/20 TSH 3.31  6/24/2020 Tg 0.1 (Access), LOR < 1 (Access), TSH 2.01, free T4 1.73  7/24/2020 [place]  TSH 1.567    Imaging  12/11/13 CT Doc  12/20/13 US neck  4/11/14 103 mCi 131I  4/21/14 131I post therapy TBS: negative  12/4/14 4.06 mCi 123I TBS: negative  8/23/16 US neck  8/14/17 US thyroid  8/20/18 US neck   8/13/19 thyroid US [facility]-- we do not have the images from this study, only the report is viewable on care everywhere   9/14/2020 neck US as reviewed by me:   Left level 4 low 0.6 x 0.5 x 0.6 cm       REVIEW OF SYSTEMS   Since July - she  Had blighted ovum between 6 and 8 week. .    Has URI -- this explains the sniffling   No covid  Negative cardiac, GI    Past Medical History  Past Medical History:   Diagnosis Date     Dry eye      Miscarriage     x 4     Papillary thyroid carcinoma (H) 02/2014      Post-surgical hypothyroidism 02/2014     Past Surgical History:   Procedure Laterality Date     C REPAIR CRUCIATE LIGAMENT,KNEE  1999    left     central neck dissection  02/04/2014     HC KNEE SCOPE,MED/LAT MENISECTOMY  2009     HC REMOVAL GALLBLADDER  2005    lap      near total thyroidectomy  02/04/2014     ZZC NONSPECIFIC PROCEDURE  2007    lipoma excision from her back       Medications  Current Outpatient Medications   Medication Sig Dispense Refill     levothyroxine (SYNTHROID/LEVOTHROID) 100 MCG tablet Take 188 mcg by mouth daily        Omega-3 1000 MG capsule Take 1 g by mouth daily       Prenatal Vit-Fe Fumarate-FA (PRENATAL MULTIVITAMIN W/IRON) 27-0.8 MG tablet Take 1 tablet by mouth daily         Allergies  Allergies   Allergen Reactions     Sulfa Drugs      Rash as a child       Family History  family history includes Cardiovascular in her maternal grandfather, maternal grandmother, and paternal grandfather; Cerebrovascular Disease in her maternal grandmother; Gastrointestinal Disease in her paternal grandmother; Graves' disease in her mother.    Social History  Social History     Tobacco Use     Smoking status: Never Smoker   Substance Use Topics     Alcohol use: No     Drug use: No     Just moved here from [place]; son has URI - negative for COVID.      Physical Exam  There were no vitals taken for this visit.  There is no height or weight on file to calculate BMI.  GENERAL :  Young woman In no apparent distress. She is sniffling/ runny nose.   EYES: Eyes grossly normal to inspection  RESP: No audible wheeze, cough, or visible cyanosis.  No visible retractions or increased work of breathing.    SKIN: Visible skin clear.   NEURO:  Mentation and speech appropriate .  PSYCH: Mentation appears normal, affect normal, normal speech and appearance well-groomed.     DATA REVIEW      Exam: US HEAD NECK SOFT TISSUE, 9/16/2020     Indication: Papillary thyroid carcinoma (H) [C73 (ICD-10-CM)];  Post-surgical  hypothyroidism     Comparison: None     Findings:   Post-thyroidectomy. A 4 x 7  mm hyperechogenic area in the left  thyroidectomy bed, likely represents residual thyroid tissue versus  postsurgical scarring.     Largest lymph nodes in the neck as described below;  *  1.3 x 0.7 x 2.3 cm ovoid right level 2 lymph nodes with visible  fatty hilum, favored as reactive.   *  1.4 x 0.5 x 2.1 cm ovoid left level 2 lymph node.  *  0.6 x 0.5 x 0.6 cm ovoid left level 4 lymph node.                                                                      Impression:   1. A 4 x 7  mm hyperechogenic area in the left thyroidectomy bed,  likely represents postsurgical changes versus fatty-replaced lymph  node. No suspicious nodules or lymph nodes in the thyroidectomy bed.  2. Scattered cervical lymph nodes as noted above, likely reactive. No  suspicious lymph node is noted. Attention on follow-up.     I have personally reviewed the examination and initial interpretation  and I agree with the findings.     ZEFERINO HENDRIX MD     Results for ANA VALDEZ (MRN [id number]) as of 10/14/2020 15:21   Ref. Range 10/12/2020 11:41   T4 Free Latest Ref Range: 0.76 - 1.46 ng/dL 1.51 (H)   TSH Latest Ref Range: 0.40 - 4.00 mU/L 0.21 (L)   T4 Total Latest Ref Range: 4.5 - 13.9 ug/dL 15.0 (H)       US HEAD NECK SOFT TISSUE on 9/24/2021 9:10 AM.     INDICATION: Papillary thyroid carcinoma (H); Post-surgical  hypothyroidism.     COMPARISON: Ultrasound dated 9/14/2020     FINDINGS:   Grayscale and color Doppler ultrasound of the cervical lymph nodes.     Right:  Level 2:   Node #1: Measures 1.2 x 0.7 x 2.2 cm, not significant changed. Normal  fatty hilum, reniform shape and hilar blood flow. Likely benign.  Node #2: Measures 1.1 x 0.5 x 1.6 cm. Normal fatty hilum, reniform  shape and hilar blood flow. Likely benign.  Level 3: No nodes.  Level 4: No nodes.  Level 5: No nodes.  Level 6: No nodes. No suspicious soft tissue within the  thyroidectomy  bed.  Level 7: No nodes.     Left:  Level 2:  Node #1: Measures 1.2 x 0.6 x 1.6 cm. Normal fatty hilum,  reniform-shaped and color Doppler flow. Likely benign.  Node #2: Measures 1.3 x 0.5 x 2.4 cm, unchanged. Normal reniform  shape, fatty hilum and hilar blood flow. Likely benign.  Level 3: No nodes.  Level 4: Node  measuring 0.7 x 0.5 x 0.6 cm, previously 0.6 x 0.5 x  0.6 cm. Normal fatty hilum, reniform shape and hilar blood flow.  Level 5: No nodes.  Level 6: No nodes. No suspicious soft tissue within the thyroidectomy  bed.  Level 7: No nodes.                                                                      IMPRESSION: Cervical lymph node with measurements as above.No  suspicious cervical lymphadenopathy or residual soft tissue within the  thyroidectomy bed.     I have personally reviewed the examination and initial interpretation  and I agree with the findings.     NORMA JUAREZ MD

## 2020-10-14 NOTE — LETTER
"10/14/2020       RE: Kaitlyn Paul  [address] [place] [id number]     Dear Colleague,    Thank you for referring your patient, Kaitlyn Paul, to the [facility] [place] at [facility]. Please see a copy of my visit note below.    Kaitlyn Paul is a 35 year old female who is being evaluated via a billable video visit.      The patient has been notified of following:     \"This video visit will be conducted via a call between you and your physician/provider. We have found that certain health care needs can be provided without the need for an in-person physical exam.  This service lets us provide the care you need with a video conversation.  If a prescription is necessary we can send it directly to your pharmacy.  If lab work is needed we can place an order for that and you can then stop by our lab to have the test done at a later time.    Video visits are billed at different rates depending on your insurance coverage.  Please reach out to your insurance provider with any questions.    If during the course of the call the physician/provider feels a video visit is not appropriate, you will not be charged for this service.\"    Patient has given verbal consent for Video visit? Yes    How would you like to obtain your AVS? MyChart    Will anyone else be joining your video visit? No      Video-Visit Details    Type of service:  Video Visit    Distant Location (provider location):  [facility] [place]     Hallie Jones MA            Endocrine Consult Video visit note-     Attending Assessment/Plan :     1.  Papillary thyroid carcinoma, 3.2 cm left with ETE, + 5 LN positive. She has been treated with near total Tx and CND, 103 mCi 131.    Tg pending.  Discussed  Repeat US in one year - prior to next visit    2.  Post surgical hypothyroidism.  Treat to target TSH < 0.4.   Current TSH at target " on LT4 188 mc/gday.  Rx refilled    3.  Recurrent miscarriage with blighted ovum since last visit.  Discussed    Due to the COVID 19 pandemic this visit was avideo visit in order to help prevent spread of infection in this high risk patient and the general population. The patient gave verbal consent for the visit today.    Start time  1521  Stop time 1538  Total time  17    Chief complaint: HISTORY OF PRESENT ILLNESS    Kaitlyn presents for follow up of papillary thyroid carcinoma, post surgical hypothyroidism.  She also has history of recurrent miscarriage.  I have seen her once before on 7/7/2020.  At that time there had been a dose change, she was not in steady state .    She had recently moved to [place], having  previously gotten  thyroid cancer care by Dr Paulo Pryor  in [place], [place] at [facility].  I have seen her once before on 7/7/2020.  At that time there had been a dose change, she was not in steady state .  Labs were to be done in September and I had recommended clinic follow up in a year, so she presents today 9 months prior to expectation .  Since our lst meeting she has conceived. Pregnancy-thyroid related standing orders are already on the system and I see were most recently drawn yesterday with TSH 0.21, total T4 15, free T4 1.51.  The  Tg is pending.  Today Kaitlyn says she is here for her annual thyroid cancer follow up. She did not see the July visit as meeting this definition.  She has questions about the labs performed 10/12 and the US from September.       The thyroid cancer treatment has been as follows:   2/4/14 Near total thyroidectomy with central lymph node dissection by Dr Sandra Mims at .  Surgical path showed 3.2 cm PTC left with ETE but no LV invasion.  5/21 LN were positive , larges tumor deposit in LN 0.4 cm.      4/11/14 103 mCi 131I  The most recent labs were obtained in late June in [place], just before she moved here. IN response to the TSH of 2.0, the LT4 dose increased  from 175 to 188 (1q00 + 88 mcg) one week ago.    We have the following labs  1/30/14 25OHD 18, PTH 29, Ca 9.4,   2/4/14 PTH 11  2/6/14 PTH 14  2/20/14: PTH 14, Ca 9.5  4/11/14: Tg 1.8, LOR < 1, .7, PTH 42  Radioiodine 103 mCi  12/4/14: Tg 0.3, LOR < 1, TSH 21.8  8/1/16: Tg < 0.1, TSH 0.44  4/29/17: Tg 0.1, LOR < 1, TSH 0.27  9/14/18 Tg < 0.1, LOR < 1, TPO 32  3/29/19: 25OHD 36  9/6/19 Tg < 0.1, LOR < 1, TSH 0.23, free  T4 2.03  9/17/19 TSH 0.28  6/22/20 TSH 3.31  6/24/2020 Tg 0.1 (Access), LOR < 1 (Access), TSH 2.01, free T4 1.73  7/24/2020 [place]  TSH 1.567    Imaging  12/11/13 CT Doc  12/20/13 US neck  4/11/14 103 mCi 131I  4/21/14 131I post therapy TBS: negative  12/4/14 4.06 mCi 123I TBS: negative  8/23/16 US neck  8/14/17 US thyroid  8/20/18 US neck   8/13/19 thyroid US [facility]-- we do not have the images from this study, only the report is viewable on care everywhere   9/14/2020 neck US as reviewed by me:   Left level 4 low 0.6 x 0.5 x 0.6 cm       REVIEW OF SYSTEMS   Since July - she  Had blighted ovum between 6 and 8 week. .    Has URI -- this explains the sniffling   No covid  Negative cardiac, GI    Past Medical History  Past Medical History:   Diagnosis Date     Dry eye      Miscarriage     x 4     Papillary thyroid carcinoma (H) 02/2014     Post-surgical hypothyroidism 02/2014     Past Surgical History:   Procedure Laterality Date     C REPAIR CRUCIATE LIGAMENT,KNEE  1999    left     central neck dissection  02/04/2014     HC KNEE SCOPE,MED/LAT MENISECTOMY  2009     HC REMOVAL GALLBLADDER  2005    lap      near total thyroidectomy  02/04/2014     ZZC NONSPECIFIC PROCEDURE  2007    lipoma excision from her back       Medications  Current Outpatient Medications   Medication Sig Dispense Refill     levothyroxine (SYNTHROID/LEVOTHROID) 100 MCG tablet Take 188 mcg by mouth daily        Omega-3 1000 MG capsule Take 1 g by mouth daily       Prenatal Vit-Fe Fumarate-FA (PRENATAL MULTIVITAMIN W/IRON)  27-0.8 MG tablet Take 1 tablet by mouth daily         Allergies  Allergies   Allergen Reactions     Sulfa Drugs      Rash as a child       Family History  family history includes Cardiovascular in her maternal grandfather, maternal grandmother, and paternal grandfather; Cerebrovascular Disease in her maternal grandmother; Gastrointestinal Disease in her paternal grandmother; Graves' disease in her mother.    Social History  Social History     Tobacco Use     Smoking status: Never Smoker   Substance Use Topics     Alcohol use: No     Drug use: No     Just moved here from [place]; son has URI - negative for COVID.      Physical Exam  There were no vitals taken for this visit.  There is no height or weight on file to calculate BMI.  GENERAL :  Young woman In no apparent distress. She is sniffling/ runny nose.   EYES: Eyes grossly normal to inspection  RESP: No audible wheeze, cough, or visible cyanosis.  No visible retractions or increased work of breathing.    SKIN: Visible skin clear.   NEURO:  Mentation and speech appropriate .  PSYCH: Mentation appears normal, affect normal, normal speech and appearance well-groomed.     DATA REVIEW      Exam: US HEAD NECK SOFT TISSUE, 9/16/2020     Indication: Papillary thyroid carcinoma (H) [C73 (ICD-10-CM)];  Post-surgical hypothyroidism     Comparison: None     Findings:   Post-thyroidectomy. A 4 x 7  mm hyperechogenic area in the left  thyroidectomy bed, likely represents residual thyroid tissue versus  postsurgical scarring.     Largest lymph nodes in the neck as described below;  *  1.3 x 0.7 x 2.3 cm ovoid right level 2 lymph nodes with visible  fatty hilum, favored as reactive.   *  1.4 x 0.5 x 2.1 cm ovoid left level 2 lymph node.  *  0.6 x 0.5 x 0.6 cm ovoid left level 4 lymph node.                                                                      Impression:   1. A 4 x 7  mm hyperechogenic area in the left thyroidectomy bed,  likely represents postsurgical changes  versus fatty-replaced lymph  node. No suspicious nodules or lymph nodes in the thyroidectomy bed.  2. Scattered cervical lymph nodes as noted above, likely reactive. No  suspicious lymph node is noted. Attention on follow-up.     I have personally reviewed the examination and initial interpretation  and I agree with the findings.     ZEFERINO HENDRIX MD     Results for ANA VALDEZ (MRN [id number]) as of 10/14/2020 15:21   Ref. Range 10/12/2020 11:41   T4 Free Latest Ref Range: 0.76 - 1.46 ng/dL 1.51 (H)   TSH Latest Ref Range: 0.40 - 4.00 mU/L 0.21 (L)   T4 Total Latest Ref Range: 4.5 - 13.9 ug/dL 15.0 (H)       Again, thank you for allowing me to participate in the care of your patient.      Sincerely,    Ghislaine Streeter MD

## 2020-10-19 LAB — LAB SCANNED RESULT: NORMAL

## 2020-12-06 ENCOUNTER — HEALTH MAINTENANCE LETTER (OUTPATIENT)
Age: 35
End: 2020-12-06

## 2020-12-11 ENCOUNTER — OFFICE VISIT (OUTPATIENT)
Dept: OBGYN | Facility: CLINIC | Age: 35
End: 2020-12-11
Payer: COMMERCIAL

## 2020-12-11 VITALS — WEIGHT: 218 LBS | DIASTOLIC BLOOD PRESSURE: 68 MMHG | SYSTOLIC BLOOD PRESSURE: 116 MMHG

## 2020-12-11 DIAGNOSIS — Z30.430 ENCOUNTER FOR INSERTION OF INTRAUTERINE CONTRACEPTIVE DEVICE: Primary | ICD-10-CM

## 2020-12-11 DIAGNOSIS — Z01.812 PRE-PROCEDURE LAB EXAM: ICD-10-CM

## 2020-12-11 LAB — HCG UR QL: NEGATIVE

## 2020-12-11 PROCEDURE — 58300 INSERT INTRAUTERINE DEVICE: CPT | Performed by: OBSTETRICS & GYNECOLOGY

## 2020-12-11 PROCEDURE — 81025 URINE PREGNANCY TEST: CPT | Performed by: OBSTETRICS & GYNECOLOGY

## 2020-12-11 RX ORDER — MULTIPLE VITAMINS W/ MINERALS TAB 9MG-400MCG
1 TAB ORAL DAILY
COMMUNITY

## 2020-12-11 SDOH — ECONOMIC STABILITY: TRANSPORTATION INSECURITY
IN THE PAST 12 MONTHS, HAS LACK OF TRANSPORTATION KEPT YOU FROM MEETINGS, WORK, OR FROM GETTING THINGS NEEDED FOR DAILY LIVING?: NOT ASKED

## 2020-12-11 SDOH — ECONOMIC STABILITY: FOOD INSECURITY: WITHIN THE PAST 12 MONTHS, YOU WORRIED THAT YOUR FOOD WOULD RUN OUT BEFORE YOU GOT MONEY TO BUY MORE.: NOT ASKED

## 2020-12-11 SDOH — ECONOMIC STABILITY: TRANSPORTATION INSECURITY
IN THE PAST 12 MONTHS, HAS THE LACK OF TRANSPORTATION KEPT YOU FROM MEDICAL APPOINTMENTS OR FROM GETTING MEDICATIONS?: NOT ASKED

## 2020-12-11 SDOH — ECONOMIC STABILITY: FOOD INSECURITY: WITHIN THE PAST 12 MONTHS, THE FOOD YOU BOUGHT JUST DIDN'T LAST AND YOU DIDN'T HAVE MONEY TO GET MORE.: NOT ASKED

## 2020-12-11 SDOH — ECONOMIC STABILITY: INCOME INSECURITY: HOW HARD IS IT FOR YOU TO PAY FOR THE VERY BASICS LIKE FOOD, HOUSING, MEDICAL CARE, AND HEATING?: NOT ASKED

## 2020-12-11 ASSESSMENT — PATIENT HEALTH QUESTIONNAIRE - PHQ9
SUM OF ALL RESPONSES TO PHQ QUESTIONS 1-9: 0
5. POOR APPETITE OR OVEREATING: NOT AT ALL

## 2020-12-11 ASSESSMENT — ANXIETY QUESTIONNAIRES
7. FEELING AFRAID AS IF SOMETHING AWFUL MIGHT HAPPEN: NOT AT ALL
1. FEELING NERVOUS, ANXIOUS, OR ON EDGE: NOT AT ALL
GAD7 TOTAL SCORE: 0
5. BEING SO RESTLESS THAT IT IS HARD TO SIT STILL: NOT AT ALL
2. NOT BEING ABLE TO STOP OR CONTROL WORRYING: NOT AT ALL
3. WORRYING TOO MUCH ABOUT DIFFERENT THINGS: NOT AT ALL
IF YOU CHECKED OFF ANY PROBLEMS ON THIS QUESTIONNAIRE, HOW DIFFICULT HAVE THESE PROBLEMS MADE IT FOR YOU TO DO YOUR WORK, TAKE CARE OF THINGS AT HOME, OR GET ALONG WITH OTHER PEOPLE: NOT DIFFICULT AT ALL
6. BECOMING EASILY ANNOYED OR IRRITABLE: NOT AT ALL

## 2020-12-11 NOTE — PROGRESS NOTES
SUBJECTIVE:                                                   Kaitlyn Paul is a 35 year old female who presents to clinic today for the following health issue(s):  Patient presents with:  Consult: discuss birth control options, has had Mirena before- inserted [id number], had another [id number] after delivery      HPI:  New patient --here today to discuss IUDs.  Has had 2 previous Mirena IUDs without issues.  Had first before her son was born in 2016 and had 2nd from 2961-4740.  Has since had 4 miscarriages and have now decided to stop TTC.  Moved to [place] from [place] last month --had worked with [facility] out there re: recurrent miscarriages.  Last miscarriage was in August.  Has had regular cycles since that time.  25d cycles with 3d menses. Usually heavy bleeding x 2d and then tapers quickly.  No intermenstrual bleeding/spotting.  +SA but currently not often due to recent unexpected loss of her father in law.  No unprotected IC in the past 2 weeks.  Hx papillary thyroid cancer --had thyroidectomy in 2014; follows with Dr. Streeter in endocrinology    ; 5yo son Anthony; living in [place]; working as  for non-profit in [place]; will finish that job at the end of this month; unsure of what she will look for next!    Had annual exam and pap smear in June before leaving [place]     Patient's last menstrual period was 12/03/2020 (exact date)..     Patient is sexually active, No obstetric history on file..  Using none for contraception.    reports that she has never smoked. She has never used smokeless tobacco.    STD testing offered?  Declined    Health maintenance updated:  yes      Problem list and histories reviewed & adjusted, as indicated.  Additional history: as documented.    Patient Active Problem List   Diagnosis     CARDIOVASCULAR SCREENING; LDL GOAL LESS THAN 160     Past Surgical History:   Procedure Laterality Date     C REPAIR CRUCIATE LIGAMENT,KNEE  1999    left     central neck dissection   02/04/2014     D & C      8/2018, 7/2019, 8/2020     HC KNEE SCOPE,MED/LAT MENISECTOMY  2009     HC REMOVAL GALLBLADDER  2005    lap      near total thyroidectomy  02/04/2014     ZZC NONSPECIFIC PROCEDURE  2007    lipoma excision from her back      Social History     Tobacco Use     Smoking status: Never Smoker     Smokeless tobacco: Never Used   Substance Use Topics     Alcohol use: No      Problem (# of Occurrences) Relation (Name,Age of Onset)    Cardiovascular (3) Maternal Grandmother, Maternal Grandfather, Paternal Grandfather    Cerebrovascular Disease (1) Maternal Grandmother    Colon Cancer (1) Maternal Grandmother    Diabetes (1) Father    Gastrointestinal Disease (1) Paternal Grandmother: celiac    Graves' disease (1) Mother    No Known Problems (3) Sister, Brother, Other    Thyroid Disease (1) Mother       Negative family history of: Thyroid Cancer            Current Outpatient Medications   Medication Sig     levonorgestrel (MIRENA) 20 MCG/24HR IUD 1 each (20 mcg) by Intrauterine route once Lot # [id number]  Exp: 2/2023  NDC: 97428-315-28     levothyroxine (SYNTHROID/LEVOTHROID) 100 MCG tablet Take 1 tablet (100 mcg) by mouth daily (total daily dose 188)     levothyroxine (SYNTHROID/LEVOTHROID) 88 MCG tablet Take 1 tablet (88 mcg) by mouth daily (total daily dose 188)     multivitamin w/minerals (MULTI-VITAMIN) tablet Take 1 tablet by mouth daily     Omega-3 1000 MG capsule Take 1 g by mouth daily     Current Facility-Administered Medications   Medication     levonorgestrel (MIRENA) 20 MCG/24HR IUD 20 mcg     Allergies   Allergen Reactions     Sulfa Drugs      Rash as a child       ROS:  12 point review of systems negative other than symptoms noted below or in the HPI.  No urinary frequency or dysuria, bladder or kidney problems, Normal menstrual cycles      OBJECTIVE:     /68   Wt 98.9 kg (218 lb)   LMP 12/03/2020 (Exact Date)   Breastfeeding No   There is no height or weight on file to calculate  BMI.    Exam:  Constitutional:  Appearance: Well nourished, well developed alert, in no acute distress  Gastrointestinal:  Abdominal Examination:  Abdomen nontender to palpation, tone normal without rigidity or guarding, no masses present, umbilicus without lesions; Liver/Spleen:  No hepatomegaly present, liver nontender to palpation; Hernias:  No hernias present  Skin: General Inspection:  No rashes present, no lesions present, no areas of discoloration.  Neurologic:  Mental Status:  Oriented X3.  Normal strength and tone, sensory exam grossly normal, mentation intact and speech normal.    Psychiatric:  Mentation appears normal and affect normal/bright.  Pelvic Exam:  External Genitalia:     Normal appearance for age, no discharge present, no tenderness present, no inflammatory lesions present, color normal  Vagina:     Normal vaginal vault without central or paravaginal defects, no discharge present, no inflammatory lesions present, no masses present  Bladder:     Nontender to palpation  Urethra:   Urethral Body:  Urethra palpation normal, urethra structural support normal   Urethral Meatus:  No erythema or lesions present  Cervix:     Appearance healthy, no lesions present, nontender to palpation, no bleeding present  Uterus:     Uterus: firm, normal sized and nontender, midplane in position.   Adnexa:     No adnexal tenderness present, no adnexal masses present  Perineum:     Perineum within normal limits, no evidence of trauma, no rashes or skin lesions present  Anus:     Anus within normal limits, no hemorrhoids present  Inguinal Lymph Nodes:     No lymphadenopathy present  Pubic Hair:     Normal pubic hair distribution for age  Genitalia and Groin:     No rashes present, no lesions present, no areas of discoloration, no masses present       In-Clinic Test Results:  Results for orders placed or performed in visit on 12/11/20 (from the past 24 hour(s))   HCG Qual, Urine ([id number])   Result Value Ref Range    HCG  Qual Urine Negative NEG^Negative       ASSESSMENT/PLAN:                                                        ICD-10-CM    1. Encounter for insertion of intrauterine contraceptive device  Z30.430 levonorgestrel (MIRENA) 20 MCG/24HR IUD     levonorgestrel (MIRENA) 20 MCG/24HR IUD 20 mcg     INSERTION INTRAUTERINE DEVICE   2. Pre-procedure lab exam  Z01.812 HCG Qual, Urine ([id number])       Patient Instructions   Mirena IUD placed today without issues.  Will use backup birth control for first week.  Return to clinic for IUD check in 6 weeks      Michaela Barnett MD  [facility]  IUD Insertion:  CONSULT:    Is a pregnancy test required: Yes.  Was it positive or negative?  Negative  Was a consent obtained?  Yes    Subjective: Kaitlyn Paul is a 35 year old  presents for IUD and desires Mirena type IUD.    Patient has been given the opportunity to ask questions about all forms of birth control, including all options appropriate for Kaitlyn Paul. Discussed that no method of birth control, except abstinence is 100% effective against pregnancy or sexually transmitted infection.     Kaitlyn Paul understands she may have the IUD removed at any time. IUD should be removed by a health care provider.    The entire insertion procedure was reviewed with the patient, including care after placement.    Patient's last menstrual period was 2020 (exact date).No allergy to betadine or shellfish. Patient declines STD screening  HCG Qual Urine   Date Value Ref Range Status   2020 Negative NEG^Negative Final     Comment:     This test is for screening purposes.  Results should be interpreted along with   the clinical picture.  Confirmation testing is available if warranted by   ordering ERK998, HCG Quantitative Pregnancy.           /68   Wt 98.9 kg (218 lb)   LMP 2020 (Exact Date)   Breastfeeding No     Pelvic Exam:   EG/BUS: normal genital  architecture without lesions, erythema or abnormal secretions.   Vagina: moist, pink, rugae with physiologic discharge and secretions  Cervix: multiparous no lesions and pink, moist, closed, without lesion or CMT  Uterus: midplane position, mobile, no pain  Adnexa: within normal limits and no masses, nodularity, tenderness    PROCEDURE NOTE: -- IUD Insertion    Reason for Insertion: contraception    .  Under sterile technique, cervix was visualized with speculum and prepped with Betadine solution swab x 3. Tenaculum was placed for stability. The uterus was gently straightened and sounded to 8.0 cm. IUD prepared for placement, and IUD inserted according to 's instructions without difficulty or significant resitance, and deployed at the fundus. The strings were visualized and trimmed to 3.0 cm from the external os. Tenaculum was removed and hemostasis noted. Speculum removed.  Patient tolerated procedure well.    Lot # [id number]  Exp: 2/2023  NDC: 86849-240-06    EBL: minimal    Complications: none    ASSESSMENT:     ICD-10-CM    1. Encounter for insertion of intrauterine contraceptive device  Z30.430 levonorgestrel (MIRENA) 20 MCG/24HR IUD     levonorgestrel (MIRENA) 20 MCG/24HR IUD 20 mcg     INSERTION INTRAUTERINE DEVICE   2. Pre-procedure lab exam  Z01.812 HCG Qual, Urine ([id number])        PLAN:    Given 's handouts, including when to have IUD removed, list of danger s/sx, side effects and follow up recommended. Encouraged condom use for prevention of STD. Back up contraception advised for 7 days if progestin method. Advised to call for any fever, for prolonged or severe pain or bleeding, abnormal vaginal discharge, or unable to palpate strings. She was advised to use pain medications (ibuprofen) as needed for mild to moderate pain. Advised to follow-up in clinic in 4-6 weeks for IUD string check if unable to find strings or as directed by provider.     Michaela Barnett MD

## 2020-12-11 NOTE — PATIENT INSTRUCTIONS
Mirena IUD placed today without issues.  Will use backup birth control for first week.  Return to clinic for IUD check in 6 weeks

## 2020-12-12 ASSESSMENT — ANXIETY QUESTIONNAIRES: GAD7 TOTAL SCORE: 0

## 2021-01-05 ENCOUNTER — TELEPHONE (OUTPATIENT)
Dept: OBGYN | Facility: CLINIC | Age: 36
End: 2021-01-05

## 2021-01-05 NOTE — TELEPHONE ENCOUNTER
Left message also Behav[employer] message sent pt to call back if unable to access her mychart  Chyna Gongora RN on 1/5/2021 at 1:45 PM

## 2021-01-05 NOTE — TELEPHONE ENCOUNTER
Reason for Call:  Other call back    Detailed comments: Patient is having some issues with her insurance and is wondering if her appt scheduled for 01/22 is NEEDED or is it okay to postpone     Phone Number Patient can be reached at: Cell number on file:    Telephone Information:   Mobile [phone]       Best Time: Anytime    Can we leave a detailed message on this number? YES    Call taken on 1/5/2021 at 1:06 PM by Darline Nathan

## 2021-01-05 NOTE — TELEPHONE ENCOUNTER
I do recommend an IUD check but okay to delay until insurance figured out; would also have her do a self string check to make certain she can feel her strings.  I don't normally do this but can at least substitute for the time being

## 2021-07-15 ENCOUNTER — MYC MEDICAL ADVICE (OUTPATIENT)
Dept: ENDOCRINOLOGY | Facility: CLINIC | Age: 36
End: 2021-07-15

## 2021-07-15 DIAGNOSIS — E89.0 POST-SURGICAL HYPOTHYROIDISM: ICD-10-CM

## 2021-07-15 DIAGNOSIS — C73 PAPILLARY THYROID CARCINOMA (H): Primary | ICD-10-CM

## 2021-07-27 ENCOUNTER — LAB (OUTPATIENT)
Dept: LAB | Facility: CLINIC | Age: 36
End: 2021-07-27
Payer: COMMERCIAL

## 2021-07-27 DIAGNOSIS — C73 PAPILLARY THYROID CARCINOMA (H): ICD-10-CM

## 2021-07-27 DIAGNOSIS — E89.0 POST-SURGICAL HYPOTHYROIDISM: ICD-10-CM

## 2021-07-27 LAB
T4 FREE SERPL-MCNC: 1.25 NG/DL (ref 0.76–1.46)
TSH SERPL DL<=0.005 MIU/L-ACNC: 0.47 MU/L (ref 0.4–4)

## 2021-07-27 PROCEDURE — 86800 THYROGLOBULIN ANTIBODY: CPT | Mod: 90 | Performed by: PATHOLOGY

## 2021-07-27 PROCEDURE — 84439 ASSAY OF FREE THYROXINE: CPT | Performed by: PATHOLOGY

## 2021-07-27 PROCEDURE — 84443 ASSAY THYROID STIM HORMONE: CPT | Performed by: PATHOLOGY

## 2021-07-27 PROCEDURE — 84432 ASSAY OF THYROGLOBULIN: CPT | Mod: 90 | Performed by: PATHOLOGY

## 2021-07-27 PROCEDURE — 36415 COLL VENOUS BLD VENIPUNCTURE: CPT | Performed by: PATHOLOGY

## 2021-07-29 ENCOUNTER — MYC MEDICAL ADVICE (OUTPATIENT)
Dept: ENDOCRINOLOGY | Facility: CLINIC | Age: 36
End: 2021-07-29

## 2021-08-03 LAB — SCANNED LAB RESULT: NORMAL

## 2021-08-10 ENCOUNTER — OFFICE VISIT (OUTPATIENT)
Dept: FAMILY MEDICINE | Facility: CLINIC | Age: 36
End: 2021-08-10
Payer: COMMERCIAL

## 2021-08-10 VITALS
OXYGEN SATURATION: 96 % | WEIGHT: 212 LBS | DIASTOLIC BLOOD PRESSURE: 64 MMHG | TEMPERATURE: 98.1 F | HEIGHT: 69 IN | BODY MASS INDEX: 31.4 KG/M2 | HEART RATE: 70 BPM | SYSTOLIC BLOOD PRESSURE: 118 MMHG

## 2021-08-10 DIAGNOSIS — Z01.818 PREOP GENERAL PHYSICAL EXAM: Primary | ICD-10-CM

## 2021-08-10 DIAGNOSIS — H54.3 IMPAIRED VISION IN BOTH EYES: ICD-10-CM

## 2021-08-10 DIAGNOSIS — L40.9 PSORIASIS: ICD-10-CM

## 2021-08-10 PROCEDURE — 99203 OFFICE O/P NEW LOW 30 MIN: CPT | Performed by: PHYSICIAN ASSISTANT

## 2021-08-10 ASSESSMENT — ENCOUNTER SYMPTOMS
RESPIRATORY NEGATIVE: 1
PSYCHIATRIC NEGATIVE: 1
CARDIOVASCULAR NEGATIVE: 1
CONSTITUTIONAL NEGATIVE: 1
NEUROLOGICAL NEGATIVE: 1
GASTROINTESTINAL NEGATIVE: 1
MUSCULOSKELETAL NEGATIVE: 1

## 2021-08-10 ASSESSMENT — MIFFLIN-ST. JEOR: SCORE: 1710.62

## 2021-08-10 NOTE — PROGRESS NOTES
[address] [address]  Phone: [phone]  Primary Provider: Gretta Ref-Primary, Physician  Pre-op Performing Provider: REED JOHNSON    PREOPERATIVE EVALUATION:  Today's date: 8/10/2021    Kaitlyn Paul is a 36 year old female who presents for a preoperative evaluation.    Surgical Information:  Surgery/Procedure: both eyes ICL   Surgery Location: [facility]  Surgeon: Jose   Surgery Date: 8/30/2021, 9/1/2021  Time of Surgery: tbd   Where patient plans to recover: At home with family  Fax number for surgical facility: [phone]    Type of Anesthesia Anticipated: to be determined    Assessment & Plan     The proposed surgical procedure is considered LOW risk.    Problem List Items Addressed This Visit     None      Visit Diagnoses     Preop general physical exam    -  Primary    Impaired vision in both eyes              Risks and Recommendations:  The patient has the following additional risks and recommendations for perioperative complications:   - No identified additional risk factors other than previously addressed    Medication Instructions:  Patient is to take all scheduled medications on the day of surgery    RECOMMENDATION:  APPROVAL GIVEN to proceed with proposed procedure, without further diagnostic evaluation.              16 minutes spent on the date of the encounter doing chart review, history and exam, documentation and further activities per the note      Subjective     HPI related to upcoming procedure: vision severely impaired for 25+ years, electing to have procedure to improve vision    Preop Questions 8/10/2021   1. Have you ever had a heart attack or stroke? No   2. Have you ever had surgery on your heart or blood vessels, such as a stent placement, a coronary artery bypass, or surgery on an artery in your head, neck, heart, or legs? No   3. Do you have chest pain with activity? No   4. Do you  have a history of  heart failure? No   5. Do you currently have a cold, bronchitis or symptoms of other infection? No   6. Do you have a cough, shortness of breath, or wheezing? No   7. Do you or anyone in your family have previous history of blood clots? No   8. Do you or does anyone in your family have a serious bleeding problem such as prolonged bleeding following surgeries or cuts? No   9. Have you ever had problems with anemia or been told to take iron pills? No   10. Have you had any abnormal blood loss such as black, tarry or bloody stools, or abnormal vaginal bleeding? No   11. Have you ever had a blood transfusion? No   12. Are you willing to have a blood transfusion if it is medically needed before, during, or after your surgery? Yes   13. Have you or any of your relatives ever had problems with anesthesia? YES - nausea   14. Do you have sleep apnea, excessive snoring or daytime drowsiness? No   15. Do you have any artifical heart valves or other implanted medical devices like a pacemaker, defibrillator, or continuous glucose monitor? No   16. Do you have artificial joints? No   17. Are you allergic to latex? No   18. Is there any chance that you may be pregnant? No       Health Care Directive:  Patient does not have a Health Care Directive or Living Will: Discussed advance care planning with patient; however, patient declined at this time.    Preoperative Review of :   reviewed - no record of controlled substances prescribed.          Review of Systems   Constitutional: Negative.    HENT: Negative.    Eyes:        As in HPI   Respiratory: Negative.    Cardiovascular: Negative.    Gastrointestinal: Negative.    Genitourinary: Negative.    Musculoskeletal: Negative.    Skin: Negative.    Neurological: Negative.    Psychiatric/Behavioral: Negative.          Patient Active Problem List    Diagnosis Date Noted     CARDIOVASCULAR SCREENING; LDL GOAL LESS THAN 160 10/31/2010     Priority: Medium      Past  "Medical History:   Diagnosis Date     Arthritis      Dry eye      Encounter for IUD insertion 12/11/2020    Mirena inserted by Dr Barnett     Encounter for IUD removal 2018    [id number] and then again 8947-7945     History of recurrent miscarriages     x 4     Papillary thyroid carcinoma (H) 02/2014     Post-surgical hypothyroidism 02/2014     Past Surgical History:   Procedure Laterality Date     C REPAIR CRUCIATE LIGAMENT,KNEE  1999    left     central neck dissection  02/04/2014     D & C      8/2018, 7/2019, 8/2020     HC KNEE SCOPE,MED/LAT MENISECTOMY  2009     HC REMOVAL GALLBLADDER  2005    lap      near total thyroidectomy  02/04/2014     ZZC NONSPECIFIC PROCEDURE  2007    lipoma excision from her back     Current Outpatient Medications   Medication Sig Dispense Refill     levothyroxine (SYNTHROID/LEVOTHROID) 100 MCG tablet Take 1 tablet (100 mcg) by mouth daily (total daily dose 188) 90 tablet 4     levothyroxine (SYNTHROID/LEVOTHROID) 88 MCG tablet Take 1 tablet (88 mcg) by mouth daily (total daily dose 188) 90 tablet 4     multivitamin w/minerals (MULTI-VITAMIN) tablet Take 1 tablet by mouth daily       Omega-3 1000 MG capsule Take 1 g by mouth daily         Allergies   Allergen Reactions     Sulfa Drugs      Rash as a child        Social History     Tobacco Use     Smoking status: Never Smoker     Smokeless tobacco: Never Used   Substance Use Topics     Alcohol use: No       History   Drug Use No         Objective     /64   Pulse 70   Temp 98.1  F (36.7  C) (Tympanic)   Ht 1.76 m (5' 9.29\")   Wt 96.2 kg (212 lb)   SpO2 96%   BMI 31.04 kg/m      Physical Exam  Constitutional:       General: She is not in acute distress.     Appearance: She is well-developed. She is not diaphoretic.   HENT:      Head: Normocephalic.      Right Ear: External ear normal.      Left Ear: External ear normal.      Nose: Nose normal.   Eyes:      Conjunctiva/sclera: Conjunctivae normal.   Cardiovascular:      Rate " and Rhythm: Normal rate and regular rhythm.      Heart sounds: Normal heart sounds.   Pulmonary:      Effort: Pulmonary effort is normal.      Breath sounds: Normal breath sounds.   Musculoskeletal:      Cervical back: Normal range of motion.   Skin:     General: Skin is warm and dry.   Neurological:      Mental Status: She is alert and oriented to person, place, and time.   Psychiatric:         Judgment: Judgment normal.           Recent Labs   Lab Test 06/22/20  0000   POTASSIUM 4.5   CR 0.91        Diagnostics:  No labs were ordered during this visit.   No EKG required for low risk surgery (cataract, skin procedure, breast biopsy, etc).    Revised Cardiac Risk Index (RCRI):  The patient has the following serious cardiovascular risks for perioperative complications:   - No serious cardiac risks = 0 points     RCRI Interpretation: 0 points: Class I (very low risk - 0.4% complication rate)           Signed Electronically by: Kaitlyn Maldonado PA-C  Copy of this evaluation report is provided to requesting physician.

## 2021-09-23 ENCOUNTER — TELEPHONE (OUTPATIENT)
Dept: ENDOCRINOLOGY | Facility: CLINIC | Age: 36
End: 2021-09-23

## 2021-09-23 DIAGNOSIS — C73 PAPILLARY THYROID CARCINOMA (H): Primary | ICD-10-CM

## 2021-09-23 DIAGNOSIS — E89.0 POST-SURGICAL HYPOTHYROIDISM: ICD-10-CM

## 2021-09-23 DIAGNOSIS — Z33.1 PREGNANCY, INCIDENTAL: ICD-10-CM

## 2021-09-23 NOTE — TELEPHONE ENCOUNTER
Lab appt in place 09/24/2021 Friday.   Mindy Chavez RN on 9/24/2021 at 8:44 AM     Ghislaine Streeter MD  to Kaitlyn Paul      9/23/21 5:48 PM  Last read by Kaitlyn Paul at 8:53 PM on 9/23/2021.      RE    mychart response sent to patient.  Please call her and read it to her if she doesn't promptly read it.  Ghislaine Streeter MD

## 2021-09-23 NOTE — TELEPHONE ENCOUNTER
Provider notified. RTC in place 03/22.  Mindy Chavez RN on 9/23/2021 at 12:10 PM     [facility] Call Center    Phone Message    May a detailed message be left on voicemail: yes     Reason for Call: Other: Patient calling stating she recieved a positive pregnancy test. Patient states Dr. Streeter may want to do other labs. Patient is having an ultrasound on 9/23/21. Please call to discuss thank you.      Action Taken: Message routed to:  [facility] ([facility] endo    Travel Screening: Not Applicable

## 2021-09-24 ENCOUNTER — LAB (OUTPATIENT)
Dept: LAB | Facility: CLINIC | Age: 36
End: 2021-09-24

## 2021-09-24 ENCOUNTER — ANCILLARY PROCEDURE (OUTPATIENT)
Dept: ULTRASOUND IMAGING | Facility: CLINIC | Age: 36
End: 2021-09-24
Payer: COMMERCIAL

## 2021-09-24 DIAGNOSIS — E89.0 POST-SURGICAL HYPOTHYROIDISM: ICD-10-CM

## 2021-09-24 DIAGNOSIS — Z33.1 PREGNANCY, INCIDENTAL: ICD-10-CM

## 2021-09-24 DIAGNOSIS — C73 PAPILLARY THYROID CARCINOMA (H): ICD-10-CM

## 2021-09-24 LAB — T4 SERPL-MCNC: 12 UG/DL (ref 4.5–13.9)

## 2021-09-24 PROCEDURE — 76536 US EXAM OF HEAD AND NECK: CPT | Mod: GC | Performed by: STUDENT IN AN ORGANIZED HEALTH CARE EDUCATION/TRAINING PROGRAM

## 2021-09-24 PROCEDURE — 36415 COLL VENOUS BLD VENIPUNCTURE: CPT

## 2021-09-24 PROCEDURE — 84436 ASSAY OF TOTAL THYROXINE: CPT

## 2021-09-24 PROCEDURE — 84443 ASSAY THYROID STIM HORMONE: CPT

## 2021-09-25 LAB — TSH SERPL DL<=0.005 MIU/L-ACNC: 0.21 MU/L (ref 0.4–4)

## 2021-09-26 ENCOUNTER — HEALTH MAINTENANCE LETTER (OUTPATIENT)
Age: 36
End: 2021-09-26

## 2021-10-12 ENCOUNTER — ANCILLARY PROCEDURE (OUTPATIENT)
Dept: ULTRASOUND IMAGING | Facility: CLINIC | Age: 36
End: 2021-10-12
Attending: OBSTETRICS & GYNECOLOGY
Payer: COMMERCIAL

## 2021-10-12 ENCOUNTER — OFFICE VISIT (OUTPATIENT)
Dept: OBGYN | Facility: CLINIC | Age: 36
End: 2021-10-12
Attending: OBSTETRICS & GYNECOLOGY
Payer: COMMERCIAL

## 2021-10-12 VITALS
WEIGHT: 204 LBS | HEART RATE: 76 BPM | SYSTOLIC BLOOD PRESSURE: 116 MMHG | HEIGHT: 70 IN | DIASTOLIC BLOOD PRESSURE: 68 MMHG | BODY MASS INDEX: 29.2 KG/M2

## 2021-10-12 DIAGNOSIS — O09.291 HISTORY OF MISCARRIAGE, CURRENTLY PREGNANT, FIRST TRIMESTER: ICD-10-CM

## 2021-10-12 DIAGNOSIS — Z32.01 PREGNANCY TEST POSITIVE: Primary | ICD-10-CM

## 2021-10-12 DIAGNOSIS — Z23 FLU VACCINE NEED: ICD-10-CM

## 2021-10-12 DIAGNOSIS — Z87.59 HISTORY OF MISCARRIAGE: ICD-10-CM

## 2021-10-12 PROCEDURE — 90471 IMMUNIZATION ADMIN: CPT | Performed by: OBSTETRICS & GYNECOLOGY

## 2021-10-12 PROCEDURE — 90686 IIV4 VACC NO PRSV 0.5 ML IM: CPT | Performed by: OBSTETRICS & GYNECOLOGY

## 2021-10-12 PROCEDURE — 76817 TRANSVAGINAL US OBSTETRIC: CPT | Performed by: OBSTETRICS & GYNECOLOGY

## 2021-10-12 PROCEDURE — 99213 OFFICE O/P EST LOW 20 MIN: CPT | Mod: 25 | Performed by: OBSTETRICS & GYNECOLOGY

## 2021-10-12 ASSESSMENT — MIFFLIN-ST. JEOR: SCORE: 1685.59

## 2021-10-12 NOTE — PROGRESS NOTES
SUBJECTIVE:                                                   Kaitlyn Paul is a 36 year old female who presents to clinic today for the following health issue(s):  Patient presents with:  Ultrasound: viability      HPI:  Here today for viability ultrasound due to history of recurrent miscarriages.  Sure LMP.  At time of our initial visit together, Kaitlyn had elected to stop TTC due to recurrent miscarriges (4 early miscarriages since 2018.  All similar timing and has been treated with both cytotec and has had D&Cs.    Very guarded today inspite of viable early pregnancy.  No vb/spotting.  Minimal cramping --nothing more than menstrual cramping.  Otherwise feeling well  Would prefer to return for repeat viability us with us in 2 weeks and push back new OB visit    Patient's last menstrual period was 2021..     Patient is sexually active, .  Using none for contraception.    reports that she has never smoked. She has never used smokeless tobacco.    STD testing offered?  Declined    Health maintenance updated:  yes    Today's PHQ-2 Score:   PHQ-2 (  [employer]) 10/12/2021   Q1: Little interest or pleasure in doing things 0   Q2: Feeling down, depressed or hopeless 0   PHQ-2 Score 0   Q1: Little interest or pleasure in doing things -   Q2: Feeling down, depressed or hopeless -   PHQ-2 Score -     Today's PHQ-9 Score:   PHQ-9 SCORE 2020   PHQ-9 Total Score 0     Today's TUCKER-7 Score:   TUCKER-7 SCORE 2020   Total Score 0       Problem list and histories reviewed & adjusted, as indicated.  Additional history: as documented.    Patient Active Problem List   Diagnosis     CARDIOVASCULAR SCREENING; LDL GOAL LESS THAN 160     Past Surgical History:   Procedure Laterality Date     C REPAIR CRUCIATE LIGAMENT,KNEE      left     central neck dissection  2014     D & C      2018, 2019, 2020     HC KNEE SCOPE,MED/LAT MENISECTOMY       HC REMOVAL GALLBLADDER      lap       "near total thyroidectomy  02/04/2014     [facility] NONSPECIFIC PROCEDURE  2007    lipoma excision from her back      Social History     Tobacco Use     Smoking status: Never Smoker     Smokeless tobacco: Never Used   Substance Use Topics     Alcohol use: No      Problem (# of Occurrences) Relation (Name,Age of Onset)    Cardiovascular (3) Maternal Grandmother, Maternal Grandfather, Paternal Grandfather    Cerebrovascular Disease (1) Maternal Grandmother    Colon Cancer (1) Maternal Grandmother    Diabetes (1) Father    Gastrointestinal Disease (1) Paternal Grandmother: celiac    Graves' disease (1) Mother    No Known Problems (3) Sister, Brother, Other    Thyroid Disease (1) Mother       Negative family history of: Thyroid Cancer            Current Outpatient Medications   Medication Sig     levothyroxine (SYNTHROID/LEVOTHROID) 100 MCG tablet Take 1 tablet (100 mcg) by mouth daily (total daily dose 188)     levothyroxine (SYNTHROID/LEVOTHROID) 88 MCG tablet Take 1 tablet (88 mcg) by mouth daily (total daily dose 188)     Loratadine (CLARITIN PO)      multivitamin w/minerals (MULTI-VITAMIN) tablet Take 1 tablet by mouth daily     Omega-3 1000 MG capsule Take 1 g by mouth daily     No current facility-administered medications for this visit.     Allergies   Allergen Reactions     Sulfa Drugs      Rash as a child       ROS:  12 point review of systems negative other than symptoms noted below or in the HPI.  No urinary frequency or dysuria, bladder or kidney problems      OBJECTIVE:     /68   Pulse 76   Ht 1.778 m (5' 10\")   Wt 92.5 kg (204 lb)   LMP 08/28/2021   BMI 29.27 kg/m    Body mass index is 29.27 kg/m .    Exam:  Constitutional:  Appearance: Well nourished, well developed alert, in no acute distress  Neurologic:  Mental Status:  Oriented X3.  Normal strength and tone, sensory exam grossly normal, mentation intact and speech normal.    Psychiatric:  Mentation appears normal and affect normal/bright. "     In-Clinic Test Results:  Results for orders placed or performed in visit on 10/12/21 (from the past 24 hour(s))   US OB Transvaginal Only    Narrative    Obstetrical Ultrasound Report  OB U/S  < 14 Weeks -  Transvaginal  [facility]  Referring Provider: Michaela Barnett MD  Sonographer: Lulu Duarte RDMS  Indication:  Viability check      Dating (mm/dd/yyyy):   LMP: 08/28/2021            EDC:  06/04/2022            GA by LMP:           6w3d     Current Scan On:  10/12/2021        EDC:  06/06/2021            GA by Current Scan:          6w1d  The calculation of the gestational age by current scan was based on CRL.  Anatomy Scan:  Foss gestation.  Biometry:  CRL                       0.48 cm                6w1d                      Yolk Sac               1.1 mm                                                     Fetal heart activity:  Rate and rhythm is within normal limits.  Fetal   heart rate: 80 bpm  Findings: Single IUP     Maternal Structures:  Cervix: The cervix appears long and closed.  Right Ovary: Wnl and Corpus luteum  Left Ovary: Wnl  Impression:     Foss IUP with growth at 6w 1d (+/- 7-10 days) which is consistent   with menstrual dating, EDC 6/4/2021.    Michaela Barnett MD         ASSESSMENT/PLAN:                                                        ICD-10-CM    1. Pregnancy test positive  Z32.01    2. History of miscarriage  Z87.59    3. Flu vaccine need  Z23 INFLUENZA VACCINE IM >6 MO VALENT IIV4 (ALFURIA/FLUZONE)       Patient Instructions   Ultrasound reviewed today with Kaitlyn.  Appropriate growth based on LMP dating.  Slower heart beat which is likely due to very early gestation.  Will return for viability us with us in 2 weeks --patient prefers to delay offical new OB visit until after that ultrasound.  Discussed mixed reviews on importance of vaginal progesterone with history of miscarriage.  I do not feel strongly that this is something Kaitlyn  needs to do for pregnancy but is more than welcome to start if she would like.  Opting to forgo this pregnancy  Flu shot today    Michaela Barnett MD  [facility]

## 2021-10-12 NOTE — PATIENT INSTRUCTIONS
Ultrasound reviewed today with Kaitlyn.  Appropriate growth based on LMP dating.  Slower heart beat which is likely due to very early gestation.  Will return for viability us with us in 2 weeks --patient prefers to delay offical new OB visit until after that ultrasound.  Discussed mixed reviews on importance of vaginal progesterone with history of miscarriage.  I do not feel strongly that this is something Kaitlyn needs to do for pregnancy but is more than welcome to start if she would like.  Opting to forgo this pregnancy

## 2021-10-25 ENCOUNTER — LAB (OUTPATIENT)
Dept: LAB | Facility: CLINIC | Age: 36
End: 2021-10-25
Payer: COMMERCIAL

## 2021-10-25 DIAGNOSIS — Z33.1 PREGNANCY, INCIDENTAL: ICD-10-CM

## 2021-10-25 DIAGNOSIS — C73 PAPILLARY THYROID CARCINOMA (H): ICD-10-CM

## 2021-10-25 DIAGNOSIS — E89.0 POST-SURGICAL HYPOTHYROIDISM: ICD-10-CM

## 2021-10-25 LAB
T4 SERPL-MCNC: 12 UG/DL (ref 4.5–13.9)
TSH SERPL DL<=0.005 MIU/L-ACNC: 0.6 MU/L (ref 0.4–4)

## 2021-10-25 PROCEDURE — 36415 COLL VENOUS BLD VENIPUNCTURE: CPT

## 2021-10-25 PROCEDURE — 84443 ASSAY THYROID STIM HORMONE: CPT

## 2021-10-25 PROCEDURE — 84436 ASSAY OF TOTAL THYROXINE: CPT

## 2021-10-29 ENCOUNTER — ANCILLARY PROCEDURE (OUTPATIENT)
Dept: ULTRASOUND IMAGING | Facility: CLINIC | Age: 36
End: 2021-10-29
Payer: COMMERCIAL

## 2021-10-29 ENCOUNTER — OFFICE VISIT (OUTPATIENT)
Dept: FAMILY MEDICINE | Facility: CLINIC | Age: 36
End: 2021-10-29
Attending: PHYSICIAN ASSISTANT
Payer: COMMERCIAL

## 2021-10-29 ENCOUNTER — OFFICE VISIT (OUTPATIENT)
Dept: OBGYN | Facility: CLINIC | Age: 36
End: 2021-10-29
Payer: COMMERCIAL

## 2021-10-29 VITALS — SYSTOLIC BLOOD PRESSURE: 120 MMHG | DIASTOLIC BLOOD PRESSURE: 66 MMHG

## 2021-10-29 VITALS — DIASTOLIC BLOOD PRESSURE: 58 MMHG | SYSTOLIC BLOOD PRESSURE: 110 MMHG

## 2021-10-29 DIAGNOSIS — Z87.59 HISTORY OF MISCARRIAGE: Primary | ICD-10-CM

## 2021-10-29 DIAGNOSIS — D18.01 CHERRY ANGIOMA: ICD-10-CM

## 2021-10-29 DIAGNOSIS — Z87.59 HISTORY OF MISCARRIAGE: ICD-10-CM

## 2021-10-29 DIAGNOSIS — O02.1 MISSED ABORTION: ICD-10-CM

## 2021-10-29 DIAGNOSIS — D22.9 MULTIPLE BENIGN NEVI: ICD-10-CM

## 2021-10-29 DIAGNOSIS — L81.4 LENTIGINES: ICD-10-CM

## 2021-10-29 DIAGNOSIS — L40.9 PSORIASIS: Primary | ICD-10-CM

## 2021-10-29 DIAGNOSIS — L82.1 SEBORRHEIC KERATOSIS: ICD-10-CM

## 2021-10-29 PROCEDURE — 99213 OFFICE O/P EST LOW 20 MIN: CPT | Performed by: OBSTETRICS & GYNECOLOGY

## 2021-10-29 PROCEDURE — 99203 OFFICE O/P NEW LOW 30 MIN: CPT | Performed by: DERMATOLOGY

## 2021-10-29 PROCEDURE — 76817 TRANSVAGINAL US OBSTETRIC: CPT | Performed by: OBSTETRICS & GYNECOLOGY

## 2021-10-29 RX ORDER — CLOBETASOL PROPIONATE 0.5 MG/G
OINTMENT TOPICAL 2 TIMES DAILY
Qty: 60 G | Refills: 11 | Status: SHIPPED | OUTPATIENT
Start: 2021-10-29

## 2021-10-29 NOTE — PROGRESS NOTES
[facility] Dermatology Clinic Note - EP    Encounter Date: Oct 29, 2021    Dermatology Problem List:  #. Psoriasis, mild  - Current rx: Clobetasol 0.05% ointment  - Previous rx: salicylic aicd    ____________________________________________    Assessment & Plan:     #. Psoriasis, mild (BSA <10%)  - Continue clobetasol 0.05% ointment prn  - Can stop salicylic acid    #. Verruca vulgaris   Improving on its own. Patient declines any treatment today.     # Lentigines, multiple benign nevi, cherry angiomas, and seborrheic keratoses  Treatment of these lesions would be purely cosmetic and not medically neccessary.  Lesion may recur and/or may not completely resolve. May need additional treatment.  Different removal options including excision, cryotherapy, cautery and /or laser.      Procedures Performed:   None    Follow-up: 1 year(s) in-person, or earlier for new or changing lesions    Scribe Disclosure:  I, Celeste Ocampo, am serving as a scribe to document services personally performed by Vijay Villegas MD based on data collection and the provider's statements to me.     Provider Disclosure:  The documentation recorded by the scribe accurately reflects the services I personally performed and the decisions made by me.    Staff:   Vijay Villegas MD  Dermatology/Dermatopathology Staff Physician  , Department of Dermatology    ____________________________________________    CC: Skin Check      HPI:  Ms. Kaitlyn Paul is a(n) 36 year old female who presents today as a new patient for FBSE. The patient was referred to dermatology by Kaitlyn Maldonado PA-C for FBSE and psoriasis.     Today, the patient reports a history of psoriasis on her knees, and elbows, frontal scalp. She applies clobetasol and salicylic acid, although this has not been as beneficial recently. Notably, she use to have particularly severe psoriasis on her bilateral knees that each resolved after ACL repair. She reports some  joint pain in her knees. Has been tested with RA, which was negative. No finger joint pains, aches, swelling, or stiffness (except in knee due to trauma).     She has a few lesions on her face that she would like examined today. When prompted, there is a spot on her chest that has been present for a few days. Suspects a cut.     Patient is otherwise feeling well, without additional skin concerns.     Labs Reviewed:  N/A    Physical Exam:  Vitals: /58   SKIN: Total skin excluding the undergarment areas was performed. The exam included the head/face, neck, both arms, chest, back, abdomen, both legs, digits and/or nails.   - Well marginated light pink plaques on right 5th and 4th MCPs, bilateral elbows, and right knee. TBSA 2%.  - There is a verrucous papule with thrombosed capillaries interrupting dermatoglyphics on the plantar surface of left foot.   - There are dome shaped bright red papules on the trunk and extremities.   - Multiple regular brown pigmented macules and papules are identified on the trunk and extremities. Noraml reticular network of moles on back, ranging between 2-4 mm.   - Scattered brown macules on sun exposed areas.  - There are waxy stuck on tan to brown papules on the trunk and extremities.   - No other lesions of concern on areas examined.     Medications:  Current Outpatient Medications   Medication     levothyroxine (SYNTHROID/LEVOTHROID) 100 MCG tablet     levothyroxine (SYNTHROID/LEVOTHROID) 88 MCG tablet     Loratadine (CLARITIN PO)     multivitamin w/minerals (MULTI-VITAMIN) tablet     Omega-3 1000 MG capsule     No current facility-administered medications for this visit.        Past Medical History:   Patient Active Problem List   Diagnosis     CARDIOVASCULAR SCREENING; LDL GOAL LESS THAN 160     Past Medical History:   Diagnosis Date     Arthritis      Dry eye      Encounter for IUD insertion 12/11/2020    Mirena inserted by Dr Barnett     Encounter for IUD removal 2018     [id number] and then again [id number]     History of recurrent miscarriages     x 4     Papillary thyroid carcinoma (H) 02/2014     Post-surgical hypothyroidism 02/2014       CC Kaitlyn Maldonado PA-C  [address] [place] [address] [place] [id number] on close of this encounter.    This note has been created using voice recognition software; while it has been reviewed, some errors may persist.

## 2021-10-29 NOTE — LETTER
10/29/2021         RE: Kaitlyn Paul  [address]  [place] [address]        Dear Colleague,    Thank you for referring your patient, Kaitlyn Paul, to the [facility]. Please see a copy of my visit note below.    [facility] Dermatology Clinic Note - EP    Encounter Date: Oct 29, 2021    Dermatology Problem List:  #. Psoriasis, mild  - Current rx: Clobetasol 0.05% ointment  - Previous rx: salicylic aicd    ____________________________________________    Assessment & Plan:     #. Psoriasis, mild (BSA <10%)  - Continue clobetasol 0.05% ointment prn  - Can stop salicylic acid    #. Verruca vulgaris   Improving on its own. Patient declines any treatment today.     # Lentigines, multiple benign nevi, cherry angiomas, and seborrheic keratoses  Treatment of these lesions would be purely cosmetic and not medically neccessary.  Lesion may recur and/or may not completely resolve. May need additional treatment.  Different removal options including excision, cryotherapy, cautery and /or laser.      Procedures Performed:   None    Follow-up: 1 year(s) in-person, or earlier for new or changing lesions    Scribe Disclosure:  I, Celeste Oacmpo, am serving as a scribe to document services personally performed by Vijay Villegas MD based on data collection and the provider's statements to me.     Provider Disclosure:  The documentation recorded by the scribe accurately reflects the services I personally performed and the decisions made by me.    Staff:   Vijay Villegas MD  Dermatology/Dermatopathology Staff Physician  , Department of Dermatology    ____________________________________________    CC: Skin Check      HPI:  Ms. Kaitlyn Paul is a(n) 36 year old female who presents today as a new patient for FBSE. The patient was referred to dermatology by Kaitlyn Maldonado PA-C for FBSE and psoriasis.     Today, the patient reports a history of  psoriasis on her knees, and elbows, frontal scalp. She applies clobetasol and salicylic acid, although this has not been as beneficial recently. Notably, she use to have particularly severe psoriasis on her bilateral knees that each resolved after ACL repair. She reports some joint pain in her knees. Has been tested with RA, which was negative. No finger joint pains, aches, swelling, or stiffness (except in knee due to trauma).     She has a few lesions on her face that she would like examined today. When prompted, there is a spot on her chest that has been present for a few days. Suspects a cut.     Patient is otherwise feeling well, without additional skin concerns.     Labs Reviewed:  N/A    Physical Exam:  Vitals: /58   SKIN: Total skin excluding the undergarment areas was performed. The exam included the head/face, neck, both arms, chest, back, abdomen, both legs, digits and/or nails.   - Well marginated light pink plaques on right 5th and 4th MCPs, bilateral elbows, and right knee. TBSA 2%.  - There is a verrucous papule with thrombosed capillaries interrupting dermatoglyphics on the plantar surface of left foot.   - There are dome shaped bright red papules on the trunk and extremities.   - Multiple regular brown pigmented macules and papules are identified on the trunk and extremities. Noraml reticular network of moles on back, ranging between 2-4 mm.   - Scattered brown macules on sun exposed areas.  - There are waxy stuck on tan to brown papules on the trunk and extremities.   - No other lesions of concern on areas examined.     Medications:  Current Outpatient Medications   Medication     levothyroxine (SYNTHROID/LEVOTHROID) 100 MCG tablet     levothyroxine (SYNTHROID/LEVOTHROID) 88 MCG tablet     Loratadine (CLARITIN PO)     multivitamin w/minerals (MULTI-VITAMIN) tablet     Omega-3 1000 MG capsule     No current facility-administered medications for this visit.        Past Medical History:    Patient Active Problem List   Diagnosis     CARDIOVASCULAR SCREENING; LDL GOAL LESS THAN 160     Past Medical History:   Diagnosis Date     Arthritis      Dry eye      Encounter for IUD insertion 12/11/2020    Mirena inserted by Dr Barnett     Encounter for IUD removal 2018 2011-2015 and then again [id number]     History of recurrent miscarriages     x 4     Papillary thyroid carcinoma (H) 02/2014     Post-surgical hypothyroidism 02/2014       CC Kaitlyn Maldonado PA-C  [address] [place] [address] [place] [phone] on close of this encounter.    This note has been created using voice recognition software; while it has been reviewed, some errors may persist.       Again, thank you for allowing me to participate in the care of your patient.        Sincerely,        Vijay Villegas MD

## 2021-10-29 NOTE — PATIENT INSTRUCTIONS
Ultrasound reviewed today.  Unfortunately has nonviable intrauterine pregnancy consistent with miscarriage.  Discussed management options at this point including expectant management vs medical management vs surgical management.  Discussed pros and cons of each including timing, symptoms, success rates, etc.    Would like to manage expectantly at this time.  Will update us within the next two weeks.  If no signs of miscarriage, will need either medication or D&C.  Patient familiar with both due to miscarriage history.    Bleeding precautions reviewed  Once miscarriage complete, will meet to discuss contraception options moving forward.

## 2021-10-29 NOTE — PROGRESS NOTES
SUBJECTIVE:                                                   Kaitlyn Paul is a 36 year old female who presents to clinic today for the following health issue(s):  Patient presents with:  Ultrasound: viability US, 9 wks, no FHTs      HPI:  Here today for repeat viability ultrsaound.  Hx of several first trimester miscarriages.  Had initial us with me 2 weeks ago which showed viable IUP at 6+1wks with slow heartbeat.   Unfortunately, today's ultrasound shows nonviable IUP measuring only 5+6wks.  No vb/spotting.  No cramping or abdominal pain.      No LMP recorded..     Patient is sexually active, .  Using none for contraception.    reports that she has never smoked. She has never used smokeless tobacco.    STD testing offered?  Declined    Health maintenance updated:  yes    Today's PHQ-2 Score:   PHQ-2 (  [employer]) 10/12/2021   Q1: Little interest or pleasure in doing things 0   Q2: Feeling down, depressed or hopeless 0   PHQ-2 Score 0   Q1: Little interest or pleasure in doing things -   Q2: Feeling down, depressed or hopeless -   PHQ-2 Score -       Problem list and histories reviewed & adjusted, as indicated.  Additional history: as documented.    Patient Active Problem List   Diagnosis     CARDIOVASCULAR SCREENING; LDL GOAL LESS THAN 160     Past Surgical History:   Procedure Laterality Date     C REPAIR CRUCIATE LIGAMENT,KNEE      left     central neck dissection  2014     D & C      2018, 2019, 2020     HC KNEE SCOPE,MED/LAT MENISECTOMY       HC REMOVAL GALLBLADDER      lap      near total thyroidectomy  2014     [facility] NONSPECIFIC PROCEDURE  2007    lipoma excision from her back      Social History     Tobacco Use     Smoking status: Never Smoker     Smokeless tobacco: Never Used   Substance Use Topics     Alcohol use: No      Problem (# of Occurrences) Relation (Name,Age of Onset)    Cardiovascular (3) Maternal Grandmother, Maternal Grandfather, Paternal  Grandfather    Cerebrovascular Disease (1) Maternal Grandmother    Colon Cancer (1) Maternal Grandmother    Diabetes (1) Father    Gastrointestinal Disease (1) Paternal Grandmother: celiac    Graves' disease (1) Mother    No Known Problems (3) Sister, Brother, Other    Thyroid Disease (1) Mother       Negative family history of: Thyroid Cancer            Current Outpatient Medications   Medication Sig     clobetasol (TEMOVATE) 0.05 % external ointment Apply topically 2 times daily     levothyroxine (SYNTHROID/LEVOTHROID) 100 MCG tablet Take 1 tablet (100 mcg) by mouth daily (total daily dose 188)     levothyroxine (SYNTHROID/LEVOTHROID) 88 MCG tablet Take 1 tablet (88 mcg) by mouth daily (total daily dose 188)     Loratadine (CLARITIN PO)      multivitamin w/minerals (MULTI-VITAMIN) tablet Take 1 tablet by mouth daily     Omega-3 1000 MG capsule Take 1 g by mouth daily     No current facility-administered medications for this visit.     Allergies   Allergen Reactions     Sulfa Drugs      Rash as a child       ROS:  12 point review of systems negative other than symptoms noted below or in the HPI.  No urinary frequency or dysuria, bladder or kidney problems      OBJECTIVE:     /66   Breastfeeding No   There is no height or weight on file to calculate BMI.    Exam:  Constitutional:  Appearance: Well nourished, well developed alert, in no acute distress  Neurologic:  Mental Status:  Oriented X3.  Normal strength and tone, sensory exam grossly normal, mentation intact and speech normal.    Psychiatric:  Mentation appears normal and affect normal/bright.     In-Clinic Test Results:  Results for orders placed or performed in visit on 10/29/21 (from the past 24 hour(s))   US OB Transvaginal Only    Narrative    Obstetrical Ultrasound Report  OB U/S  < 14 Weeks -  Transvaginal  [facility]  Referring Provider: Dr. Michaela Barnett  Sonographer: Rosemary Tom RDMS  Indication:  Viability check       Dating (mm/dd/yyyy):   LMP: 21                 EDC:  22  GA by LMP:         8w6d     Current Scan On:  10/29/2021        EDC:  22  GA by Current Scan:        5w6d  The calculation of the gestational age by current scan was based on CRL.  Anatomy Scan:  Foss gestation.  Biometry:  CRL                       .32 cm                  5w6d                      Yolk Sac               2.4 mm   Mean gestational sac    2.12 cm       7w0d                                                   Fetal heart activity:  No fetal heart motion  Findings: Non viable IUP     Maternal Structures:  Cervix: The cervix appears long and closed.  Right Ovary: Wnl  Left Ovary: Wnl  Impression:      Early foss IUP with no cardiac activity seen, consistant with a   missed AB.  Measures 5+6d by today's ultrasound.    Michaela Barnett MD         ASSESSMENT/PLAN:                                                        ICD-10-CM    1. History of miscarriage  Z87.59    2. Missed   O02.1        Patient Instructions   Ultrasound reviewed today.  Unfortunately has nonviable intrauterine pregnancy consistent with miscarriage.  Discussed management options at this point including expectant management vs medical management vs surgical management.  Discussed pros and cons of each including timing, symptoms, success rates, etc.    Would like to manage expectantly at this time.  Will update us within the next two weeks.  If no signs of miscarriage, will need either medication or D&C.  Patient familiar with both due to miscarriage history.    Bleeding precautions reviewed  Once miscarriage complete, will meet to discuss contraception options moving forward.        Michaela Barnett MD  [facility] [place]

## 2021-10-30 DIAGNOSIS — E89.0 POST-SURGICAL HYPOTHYROIDISM: ICD-10-CM

## 2021-10-30 DIAGNOSIS — C73 PAPILLARY THYROID CARCINOMA (H): ICD-10-CM

## 2021-11-03 DIAGNOSIS — C73 PAPILLARY THYROID CARCINOMA (H): Primary | ICD-10-CM

## 2021-11-03 DIAGNOSIS — E89.0 POST-SURGICAL HYPOTHYROIDISM: ICD-10-CM

## 2021-11-03 RX ORDER — LEVOTHYROXINE SODIUM 88 UG/1
TABLET ORAL
Qty: 90 TABLET | Refills: 4 | OUTPATIENT
Start: 2021-11-03

## 2021-11-03 RX ORDER — LEVOTHYROXINE SODIUM 175 UG/1
TABLET ORAL
Qty: 96 TABLET | Refills: 4 | Status: SHIPPED | OUTPATIENT
Start: 2021-11-03 | End: 2022-11-01

## 2021-11-03 RX ORDER — LEVOTHYROXINE SODIUM 100 UG/1
TABLET ORAL
Qty: 90 TABLET | Refills: 4 | OUTPATIENT
Start: 2021-11-03

## 2021-11-11 ENCOUNTER — LAB (OUTPATIENT)
Dept: LAB | Facility: CLINIC | Age: 36
End: 2021-11-11
Payer: COMMERCIAL

## 2021-11-11 ENCOUNTER — TELEPHONE (OUTPATIENT)
Dept: OBGYN | Facility: CLINIC | Age: 36
End: 2021-11-11

## 2021-11-11 DIAGNOSIS — O02.1 MISSED ABORTION: ICD-10-CM

## 2021-11-11 LAB — B-HCG SERPL-ACNC: 4296 IU/L (ref 0–5)

## 2021-11-11 PROCEDURE — 36415 COLL VENOUS BLD VENIPUNCTURE: CPT

## 2021-11-11 PROCEDURE — 84702 CHORIONIC GONADOTROPIN TEST: CPT

## 2021-11-22 ENCOUNTER — LAB (OUTPATIENT)
Dept: LAB | Facility: CLINIC | Age: 36
End: 2021-11-22
Payer: COMMERCIAL

## 2021-11-22 DIAGNOSIS — O02.1 MISSED ABORTION: ICD-10-CM

## 2021-11-22 LAB — B-HCG SERPL-ACNC: 326 IU/L (ref 0–5)

## 2021-11-22 PROCEDURE — 36415 COLL VENOUS BLD VENIPUNCTURE: CPT

## 2021-11-22 PROCEDURE — 84702 CHORIONIC GONADOTROPIN TEST: CPT

## 2021-11-23 NOTE — RESULT ENCOUNTER NOTE
Please call her and inform her of results and plan.  Her hcg levels have dropped significantly.  I would recommend that she repeat her levels again in one week.  We may consider a repeat ultrasound +/-repeating a dose of her medication depending on her levels at that time.  Please also place hcg quant for next week on Monday

## 2021-11-29 ENCOUNTER — OFFICE VISIT (OUTPATIENT)
Dept: ORTHOPEDICS | Facility: CLINIC | Age: 36
End: 2021-11-29
Payer: COMMERCIAL

## 2021-11-29 ENCOUNTER — ANCILLARY PROCEDURE (OUTPATIENT)
Dept: GENERAL RADIOLOGY | Facility: CLINIC | Age: 36
End: 2021-11-29
Attending: FAMILY MEDICINE
Payer: COMMERCIAL

## 2021-11-29 ENCOUNTER — LAB (OUTPATIENT)
Dept: LAB | Facility: CLINIC | Age: 36
End: 2021-11-29
Payer: COMMERCIAL

## 2021-11-29 DIAGNOSIS — G89.29 CHRONIC PAIN OF RIGHT KNEE: ICD-10-CM

## 2021-11-29 DIAGNOSIS — M25.561 CHRONIC PAIN OF RIGHT KNEE: Primary | ICD-10-CM

## 2021-11-29 DIAGNOSIS — G89.29 CHRONIC PAIN OF RIGHT KNEE: Primary | ICD-10-CM

## 2021-11-29 DIAGNOSIS — O02.1 MISSED ABORTION: ICD-10-CM

## 2021-11-29 DIAGNOSIS — M25.562 CHRONIC PAIN OF LEFT KNEE: ICD-10-CM

## 2021-11-29 DIAGNOSIS — M25.561 CHRONIC PAIN OF RIGHT KNEE: ICD-10-CM

## 2021-11-29 DIAGNOSIS — G89.29 CHRONIC PAIN OF LEFT KNEE: ICD-10-CM

## 2021-11-29 LAB — B-HCG SERPL-ACNC: 111 IU/L (ref 0–5)

## 2021-11-29 PROCEDURE — 73564 X-RAY EXAM KNEE 4 OR MORE: CPT | Mod: LT | Performed by: RADIOLOGY

## 2021-11-29 PROCEDURE — 99204 OFFICE O/P NEW MOD 45 MIN: CPT | Performed by: FAMILY MEDICINE

## 2021-11-29 PROCEDURE — 84702 CHORIONIC GONADOTROPIN TEST: CPT

## 2021-11-29 PROCEDURE — 36415 COLL VENOUS BLD VENIPUNCTURE: CPT

## 2021-11-29 NOTE — LETTER
11/29/2021         RE: Kaitlyn Paul  [address] [address]  [place] [address]        Dear Colleague,    Thank you for referring your patient, Kaitlyn Paul, to the [facility]IC[facility] [place]. Please see a copy of my visit note below.          [facility]  11/29/2021    Kaitlyn Paul's chief complaint for this visit includes:  Chief Complaint   Patient presents with     Consult     chronic right knee pain        Reason for visit:     What part of your body is injured / painful?  bilateral knee    What caused the injury /pain? No inciting event     How long ago did your injury occur or pain begin? problem is longstanding    What are your most bothersome symptoms? Pain    How would you characterize your symptom?  aching    What makes your symptoms better? Rest    What makes your symptoms worse? Movement    Have you been previously seen for this problem? No    Medical History:    Any recent changes to your medical history? No    Any new medication prescribed since last visit? No    Have you had surgery on this body part before? No      Review of Systems:    Do you have fever, chills, weight loss? No    Do you have any vision problems? No    Do you have any chest pain or edema? No    Do you have any shortness of breath or wheezing?  No    Do you have stomach problems? No    Do you have any urinary track issues? No    Do you have any numbness or focal weakness? No    Do you have diabetes? No    Do you have problems with bleeding or clotting? No    Do you have an rashes or other skin lesions? No       CHIEF COMPLAINT:  Consult (chronic right knee pain )       HISTORY OF PRESENT ILLNESS  Ms. Paul is a pleasant 36 year old female who presents to clinic today with bilateral knee pain.  Celeste has recurrent bilateral knee effusions, her right knee is particularly bothersome at the moment.  She played basketball growing up, she had left knee surgery to  reconstruct a torn ACL in 2000, she had a subsequent scope in 2008.  She also tore her right ACL in 2014, this was reconstructed at that time.  Had a left knee scope at the same time for recurrence left knee effusions with intra-articular debris.    Right knee swelling started with no clear inciting event this week.  Both knees are quite bothersome whenever she walks.    Additional history: as documented    MEDICAL HISTORY  Patient Active Problem List   Diagnosis     CARDIOVASCULAR SCREENING; LDL GOAL LESS THAN 160       Current Outpatient Medications   Medication Sig Dispense Refill     clobetasol (TEMOVATE) 0.05 % external ointment Apply topically 2 times daily 60 g 11     levothyroxine (SYNTHROID/LEVOTHROID) 175 MCG tablet MON to SAT 1 tablet/day; SUN 1.5 tablet 96 tablet 4     Loratadine (CLARITIN PO)        multivitamin w/minerals (MULTI-VITAMIN) tablet Take 1 tablet by mouth daily       Omega-3 1000 MG capsule Take 1 g by mouth daily       ondansetron (ZOFRAN) 4 MG tablet Take 1 tablet (4 mg) by mouth every 8 hours as needed for nausea 8 tablet 0       Allergies   Allergen Reactions     Sulfa Drugs      Rash as a child       Family History   Problem Relation Age of Onset     Graves' disease Mother      Thyroid Disease Mother      Diabetes Father      Cerebrovascular Disease Maternal Grandmother      Cardiovascular Maternal Grandmother      Colon Cancer Maternal Grandmother      Cardiovascular Maternal Grandfather      Gastrointestinal Disease Paternal Grandmother         celiac     Cardiovascular Paternal Grandfather      No Known Problems Sister      No Known Problems Brother      No Known Problems Other      Thyroid Cancer No family hx of        Additional medical/Social/Surgical histories reviewed in EPIC and updated as appropriate.        PHYSICAL EXAM  General  - normal appearance, in no obvious distress  HEENT  - conjunctivae not injected, moist mucous membranes  CV  - normal popliteal pulse  Pulm  -  normal respiratory pattern, non-labored  Musculoskeletal - right and left knee  - stance: normal gait without limp  - inspection: 1+ effusion right knee  - palpation: no joint line tenderness, patella and patellar tendon non-tender  - ROM: 135 degrees flexion, 0 degrees extension, not painful, normal actively and passively  - strength: 5/5 in flexion, 5/5 in extension  - special tests:  (-) Lachman  (-) anterior drawer  (-) Mele  (-) varus at 0 and 30 degrees flexion  (-) valgus at 0 and 30 degrees flexion    Neuro  - no sensory or motor deficit, grossly normal coordination, normal muscle tone  Skin  - no ecchymosis, erythema, warmth, or induration, no obvious rash  Psych  - interactive, appropriate, normal mood and affect        ASSESSMENT & PLAN  Ms. Paul is a 36 year old female who presents to clinic today with recurrent bilateral knee effusions.    I ordered and independently reviewed x-rays of her knees, this does show mild osteoarthritis of the medial compartments with what appear to be large osteophytes at the posterior lateral aspect.    Given her recurrence, painful effusions I am ordering MR imaging of each knee.  I will get in touch with her with the results.    It was a pleasure seeing Kaitlyn today.    Osmar Buchanan DO, [facility]  Primary Care Sports Medicine      This note was constructed using Dragon dictation software, please excuse any minor errors in spelling, grammar, or syntax.        Again, thank you for allowing me to participate in the care of your patient.        Sincerely,        Osmar Buchanan DO

## 2021-11-29 NOTE — PATIENT INSTRUCTIONS
Thanks for coming today.  Ortho/Sports Medicine Clinic  [address] [place] [address]    To schedule future appointments in Ortho Clinic, you may call [phone].    To schedule ordered imaging by your Provider: Call [facility] at [phone]    Fixed - Parking Tickets available online at:   [email or web address]    Please call if any further questions or concerns [phone] and ask for the Orthopedic Department. Clinic hours 8 am to 5 pm.    Return to clinic if symptoms worsen.

## 2021-11-29 NOTE — PROGRESS NOTES
[facility]  11/29/2021    Kaitlyn Paul's chief complaint for this visit includes:  Chief Complaint   Patient presents with     Consult     chronic right knee pain        Reason for visit:     What part of your body is injured / painful?  bilateral knee    What caused the injury /pain? No inciting event     How long ago did your injury occur or pain begin? problem is longstanding    What are your most bothersome symptoms? Pain    How would you characterize your symptom?  aching    What makes your symptoms better? Rest    What makes your symptoms worse? Movement    Have you been previously seen for this problem? No    Medical History:    Any recent changes to your medical history? No    Any new medication prescribed since last visit? No    Have you had surgery on this body part before? No      Review of Systems:    Do you have fever, chills, weight loss? No    Do you have any vision problems? No    Do you have any chest pain or edema? No    Do you have any shortness of breath or wheezing?  No    Do you have stomach problems? No    Do you have any urinary track issues? No    Do you have any numbness or focal weakness? No    Do you have diabetes? No    Do you have problems with bleeding or clotting? No    Do you have an rashes or other skin lesions? No       CHIEF COMPLAINT:  Consult (chronic right knee pain )       HISTORY OF PRESENT ILLNESS  Ms. Paul is a pleasant 36 year old female who presents to clinic today with bilateral knee pain.  Celeste has recurrent bilateral knee effusions, her right knee is particularly bothersome at the moment.  She played basketball growing up, she had left knee surgery to reconstruct a torn ACL in 2000, she had a subsequent scope in 2008.  She also tore her right ACL in 2014, this was reconstructed at that time.  Had a left knee scope at the same time for recurrence left knee effusions with intra-articular debris.    Right knee swelling started with no  clear inciting event this week.  Both knees are quite bothersome whenever she walks.    Additional history: as documented    MEDICAL HISTORY  Patient Active Problem List   Diagnosis     CARDIOVASCULAR SCREENING; LDL GOAL LESS THAN 160       Current Outpatient Medications   Medication Sig Dispense Refill     clobetasol (TEMOVATE) 0.05 % external ointment Apply topically 2 times daily 60 g 11     levothyroxine (SYNTHROID/LEVOTHROID) 175 MCG tablet MON to SAT 1 tablet/day; SUN 1.5 tablet 96 tablet 4     Loratadine (CLARITIN PO)        multivitamin w/minerals (MULTI-VITAMIN) tablet Take 1 tablet by mouth daily       Omega-3 1000 MG capsule Take 1 g by mouth daily       ondansetron (ZOFRAN) 4 MG tablet Take 1 tablet (4 mg) by mouth every 8 hours as needed for nausea 8 tablet 0       Allergies   Allergen Reactions     Sulfa Drugs      Rash as a child       Family History   Problem Relation Age of Onset     Graves' disease Mother      Thyroid Disease Mother      Diabetes Father      Cerebrovascular Disease Maternal Grandmother      Cardiovascular Maternal Grandmother      Colon Cancer Maternal Grandmother      Cardiovascular Maternal Grandfather      Gastrointestinal Disease Paternal Grandmother         celiac     Cardiovascular Paternal Grandfather      No Known Problems Sister      No Known Problems Brother      No Known Problems Other      Thyroid Cancer No family hx of        Additional medical/Social/Surgical histories reviewed in EPIC and updated as appropriate.        PHYSICAL EXAM  General  - normal appearance, in no obvious distress  HEENT  - conjunctivae not injected, moist mucous membranes  CV  - normal popliteal pulse  Pulm  - normal respiratory pattern, non-labored  Musculoskeletal - right and left knee  - stance: normal gait without limp  - inspection: 1+ effusion right knee  - palpation: no joint line tenderness, patella and patellar tendon non-tender  - ROM: 135 degrees flexion, 0 degrees extension, not  painful, normal actively and passively  - strength: 5/5 in flexion, 5/5 in extension  - special tests:  (-) Lachman  (-) anterior drawer  (-) Mele  (-) varus at 0 and 30 degrees flexion  (-) valgus at 0 and 30 degrees flexion    Neuro  - no sensory or motor deficit, grossly normal coordination, normal muscle tone  Skin  - no ecchymosis, erythema, warmth, or induration, no obvious rash  Psych  - interactive, appropriate, normal mood and affect        ASSESSMENT & PLAN  Ms. Paul is a 36 year old female who presents to clinic today with recurrent bilateral knee effusions.    I ordered and independently reviewed x-rays of her knees, this does show mild osteoarthritis of the medial compartments with what appear to be large osteophytes at the posterior lateral aspect.    Given her recurrence, painful effusions I am ordering MR imaging of each knee.  I will get in touch with her with the results.    It was a pleasure seeing Kaitlyn today.    Osmar Buchanan DO, [facility]  Primary Care Sports Medicine      This note was constructed using Dragon dictation software, please excuse any minor errors in spelling, grammar, or syntax.

## 2021-11-29 NOTE — RESULT ENCOUNTER NOTE
Please inform of levels.  Continues to slowly drop.  Is she still having any bleeding or cramping?  If so, I would like her to repeat an ultrasound and see me.  If bleeding and cramping have stopped, we can continue with weekly quants until <5

## 2021-11-30 NOTE — TELEPHONE ENCOUNTER
DIAGNOSIS: bilateral knee pain right is worse currently referred by Dr. Osmar Buchanan    APPOINTMENT DATE: 12.13.21   NOTES STATUS DETAILS   OFFICE NOTE from referring provider Internal 11.29.21 Dr Osmar Buchanan, [facility]   OFFICE NOTE from other specialist N/A    DISCHARGE SUMMARY from hospital N/A    DISCHARGE REPORT from the ER N/A    OPERATIVE REPORT Care Everywhere 2014 - sent to scan  4.6.09 [facility]  2000 - no records   EMG report N/A    MEDICATION LIST Internal    MRI Internal 12.7.08 L knee   DEXA (osteoporosis/bone health) N/A    CT SCAN N/A    XRAYS (IMAGES & REPORTS) Internal 11.29.21 B knees     Action 12.2.21 11:15 AM ALE[facility]   Action Taken Faxed request to [facility] [phone] and ortho [facility] [phone]     Action 12.7.21 7:28 AM ALEAH   Action Taken Faxed 2nd request to both places     Action 12.10.21 10:43 AM ALEAH   Action Taken Called [email or web address] and then called the medical [facility] they use [phone]. Requests take 7-10 business days      Action 12.16.21 9:07 AM ALEAH   Action Taken Imaging disc was received but records have not been faxed. Faxed another request     [email or web address] does not have records going back that far for this pt. All available records received.

## 2021-12-06 ENCOUNTER — LAB (OUTPATIENT)
Dept: LAB | Facility: CLINIC | Age: 36
End: 2021-12-06

## 2021-12-06 DIAGNOSIS — O02.1 MISSED ABORTION: ICD-10-CM

## 2021-12-06 LAB — B-HCG SERPL-ACNC: 11 IU/L (ref 0–5)

## 2021-12-06 PROCEDURE — 36415 COLL VENOUS BLD VENIPUNCTURE: CPT

## 2021-12-06 PROCEDURE — 84702 CHORIONIC GONADOTROPIN TEST: CPT

## 2021-12-07 NOTE — RESULT ENCOUNTER NOTE
Please inform of results --quant continues to drop considerably.  Instead of a blood draw, let's have her do a home UPT next week and would only need a quant drawn IF her UPT was positive.  If negative, can stop testing

## 2021-12-09 ENCOUNTER — MYC MEDICAL ADVICE (OUTPATIENT)
Dept: OBGYN | Facility: CLINIC | Age: 36
End: 2021-12-09
Payer: COMMERCIAL

## 2021-12-09 DIAGNOSIS — Z87.59 HISTORY OF MISCARRIAGE: ICD-10-CM

## 2021-12-09 DIAGNOSIS — O02.1 MISSED ABORTION: Primary | ICD-10-CM

## 2021-12-09 DIAGNOSIS — R10.2 PELVIC CRAMPING: ICD-10-CM

## 2021-12-09 NOTE — TELEPHONE ENCOUNTER
LMP 12/5/21  Negative UPT    Please advise on mychart message about continued cramping. Any further recommendatinos?    Nicole Self RN on 12/9/2021 at 5:01 PM

## 2021-12-09 NOTE — TELEPHONE ENCOUNTER
I'm fine with her having an ultrasound and visit.  Can be the next available with both us and my schedule depending on availability --not something that is emergent with no bleeding and neg UPT

## 2021-12-10 NOTE — TELEPHONE ENCOUNTER
Routing pt [employer] message to Scheduling to contact patient.  Berna Mercer RN on 12/10/2021 at 6:28 AM

## 2021-12-12 ENCOUNTER — HOSPITAL ENCOUNTER (OUTPATIENT)
Dept: MRI IMAGING | Facility: CLINIC | Age: 36
End: 2021-12-12
Attending: FAMILY MEDICINE
Payer: COMMERCIAL

## 2021-12-12 DIAGNOSIS — M25.562 CHRONIC PAIN OF LEFT KNEE: ICD-10-CM

## 2021-12-12 DIAGNOSIS — G89.29 CHRONIC PAIN OF LEFT KNEE: ICD-10-CM

## 2021-12-12 DIAGNOSIS — G89.29 CHRONIC PAIN OF RIGHT KNEE: ICD-10-CM

## 2021-12-12 DIAGNOSIS — M25.561 CHRONIC PAIN OF RIGHT KNEE: ICD-10-CM

## 2021-12-12 PROCEDURE — 73721 MRI JNT OF LWR EXTRE W/O DYE: CPT | Mod: 26 | Performed by: RADIOLOGY

## 2021-12-12 PROCEDURE — 73721 MRI JNT OF LWR EXTRE W/O DYE: CPT | Mod: RT

## 2021-12-13 ENCOUNTER — OFFICE VISIT (OUTPATIENT)
Dept: ORTHOPEDICS | Facility: CLINIC | Age: 36
End: 2021-12-13
Payer: COMMERCIAL

## 2021-12-13 ENCOUNTER — PRE VISIT (OUTPATIENT)
Dept: ORTHOPEDICS | Facility: CLINIC | Age: 36
End: 2021-12-13

## 2021-12-13 VITALS — BODY MASS INDEX: 29.35 KG/M2 | HEIGHT: 70 IN | WEIGHT: 205 LBS

## 2021-12-13 DIAGNOSIS — G89.29 CHRONIC PAIN OF RIGHT KNEE: Primary | ICD-10-CM

## 2021-12-13 DIAGNOSIS — M25.561 CHRONIC PAIN OF RIGHT KNEE: Primary | ICD-10-CM

## 2021-12-13 PROCEDURE — 99203 OFFICE O/P NEW LOW 30 MIN: CPT | Mod: 25 | Performed by: ORTHOPAEDIC SURGERY

## 2021-12-13 PROCEDURE — 20610 DRAIN/INJ JOINT/BURSA W/O US: CPT | Mod: RT | Performed by: ORTHOPAEDIC SURGERY

## 2021-12-13 RX ADMIN — LIDOCAINE HYDROCHLORIDE 8 ML: 5 INJECTION, SOLUTION INFILTRATION; INTRAVENOUS at 08:38

## 2021-12-13 RX ADMIN — TRIAMCINOLONE ACETONIDE 40 MG: 40 INJECTION, SUSPENSION INTRA-ARTICULAR; INTRAMUSCULAR at 08:38

## 2021-12-13 ASSESSMENT — MIFFLIN-ST. JEOR: SCORE: 1690.12

## 2021-12-13 NOTE — TELEPHONE ENCOUNTER
Scheduled currently for 1/20/2022 - patient not happy with that as she is having the pain now and scheduling a month out isn't going to do her any good.    Also, need order placed for ultrasound.

## 2021-12-13 NOTE — PROGRESS NOTES
CHIEF CONCERN: bilateral, right > left, knee pain    HISTORY:   Patient has a complicated surgical history as below regarding her knees.  She has had bilateral ACL reconstructions with subsequent arthroscopic procedures on her left knee.  She has had some continued pain to her left knee but her primary concern today is her right knee.    She states that she has had intermittent atraumatic knee effusions to the right side over the past 3-4 months.  These occur approximately every two weeks or so and last for 3-4 days.  These episodes make it challenging for her to work out or do things like go for walks.  They have been resolving on their own.  She states this feels sort of like her left knee did before her prior scopes.    She has undergone multiple aspirations and injections to her left knee but never to her right.    PAST MEDICAL HISTORY: (Reviewed with the patient and in the EPIC medical record)  1. Thyroid papillary carcinoma and post-surgical hypothyroidism    PAST SURGICAL HISTORY: (Reviewed with the patient and in the EPIC medical record)  1. Right knee ACL reconstruction with hamstring autograft in 2014 in [place]  2. Left knee ACL reconstruction with BTB autograft in 1999 in [place], subsequent arthroscopic procedures in 2008 and 2014    MEDICATIONS: (Reviewed with the patient and in the [facility] medical record)    Notable medications include: levothyroxine    ALLERGIES: (Reviewed with the patient and in the EPIC medical record)  1. Sulfa drugs      SOCIAL HISTORY: (Reviewed with the patient and in the medical record)  --Tobacco: denies  --Occupation: works at a non-profit  --Avocation/Sport: enjoys doing 9-round (circuit training/HIIT), walking, etc    FAMILY HISTORY: (Reviewed with the patient and in the medical record)  -- No family history of bleeding, clotting, or difficulty with anesthesia    REVIEW OF SYSTEMS: (Reviewed with the patient and on the health intake form)  -- A comprehensive 10 point  review of systems was conducted and is negative except as noted in the HPI    EXAM:     General: Awake, Alert and Oriented, No acute Distress. Articulate and Interactive    Body mass index is 29.41 kg/m .    Right Lower extremity :    Skin is Warm and Well perfused, no suggestion of infection.  Mild effusion    Incisions well-healed without signs or symptoms of infection    Good range of motion -5 to 130 degrees    Lachman's and anterior drawer intact, posterior drawer intact    Positive lateral>medial joint line pain    EHL/FHL/TA/GS 5/5    Sensation intact L3-S1    2+ Dorsalis Pedis Pulse    Left Lower extremity :    Skin is Warm and Well perfused, no suggestion of infection.  Mild effusion    Incisions well-healed without signs or symptoms of infection    Good range of motion -5 to 130 degrees    Lachman's and anterior drawer intact, posterior drawer intact    Positive lateral>medial joint line pain, less than other side    EHL/FHL/TA/GS 5/5    Sensation intact L3-S1    2+ Dorsalis Pedis Pulse      IMAGING:    Plain Radiographs: 4 views of bilateral knees were obtained on 11/29/21 and reviewed today 12/13/21.  They demonstrate:  -post-operative changes associated with ACL reconstructions on bilateral knees  -mild medial>lateral/patellofemoral compartmental changes    MRI:  -MRI right knee non-contrast obtained 12/12/21 and reviewed today 12/13/21 demonstrate:  -Mild effusion, intact ACL, tricompartmental arthritic changes    -MRI left knee non-contrast obtained 12/12/21 and reviewed today 12/13/21 demonstrate:  -Mild effusion, intact ACL, tricompartmental arthritic changes  -Don't visualize lateral meniscal tear    ASSESSMENT:  1. Post-traumatic arthritis, bilateral knees, tricompartmental    PLAN:    We discussed the diagnosis, prognosis, and treatment options.  We discussed non-operative versus operative intervention.    Non-operative intervention would consist of activity modification, NSAIDs, PT,  injections.    Operative intervention would consist of arthroscopy with chondroplasty.  Post-operative restrictions would include no running/jumping for 6 weeks, no brace, weight-bearing as tolerated.  We discussed risks, benefits, and alternatives to the procedure.  Risks include, but are not limited to, infection, damage to surrounding structures, and worsening or lack of improvement.    The patient understands and agrees.  They would like to proceed with non-operative intervention at this time.  She would like to proceed with an injections today.  All of their questions were answered.    She can follow-up on an as needed basis.    The patient was seen and examined with Dr. Pena, who agrees with the assessment and plan as above.

## 2021-12-13 NOTE — LETTER
12/13/2021         RE: Kaitlyn Paul  [address] [facility] [address]        Dear Colleague,    Thank you for referring your patient, Kaitlyn Paul, to the [facility] [place]. Please see a copy of my visit note below.    CHIEF CONCERN: bilateral, right > left, knee pain    HISTORY:   Patient has a complicated surgical history as below regarding her knees.  She has had bilateral ACL reconstructions with subsequent arthroscopic procedures on her left knee.  She has had some continued pain to her left knee but her primary concern today is her right knee.    She states that she has had intermittent atraumatic knee effusions to the right side over the past 3-4 months.  These occur approximately every two weeks or so and last for 3-4 days.  These episodes make it challenging for her to work out or do things like go for walks.  They have been resolving on their own.  She states this feels sort of like her left knee did before her prior scopes.    She has undergone multiple aspirations and injections to her left knee but never to her right.    PAST MEDICAL HISTORY: (Reviewed with the patient and in the [facility] medical record)  1. Thyroid papillary carcinoma and post-surgical hypothyroidism    PAST SURGICAL HISTORY: (Reviewed with the patient and in the [facility] medical record)  1. Right knee ACL reconstruction with hamstring autograft in 2014 in [place]  2. Left knee ACL reconstruction with BTB autograft in 1999 in [place], subsequent arthroscopic procedures in 2008 and 2014    MEDICATIONS: (Reviewed with the patient and in the [facility] medical record)    Notable medications include: levothyroxine    ALLERGIES: (Reviewed with the patient and in the [facility] medical record)  1. Sulfa drugs      SOCIAL HISTORY: (Reviewed with the patient and in the medical record)  --Tobacco: denies  --Occupation: works at a non-profit  --Avocation/Sport: enjoys doing 9-round (circuit  training/HIIT), walking, etc    FAMILY HISTORY: (Reviewed with the patient and in the medical record)  -- No family history of bleeding, clotting, or difficulty with anesthesia    REVIEW OF SYSTEMS: (Reviewed with the patient and on the health intake form)  -- A comprehensive 10 point review of systems was conducted and is negative except as noted in the HPI    EXAM:     General: Awake, Alert and Oriented, No acute Distress. Articulate and Interactive    Body mass index is 29.41 kg/m .    Right Lower extremity :    Skin is Warm and Well perfused, no suggestion of infection.  Mild effusion    Incisions well-healed without signs or symptoms of infection    Good range of motion -5 to 130 degrees    Lachman's and anterior drawer intact, posterior drawer intact    Positive lateral>medial joint line pain    EHL/FHL/TA/GS 5/5    Sensation intact L3-S1    2+ Dorsalis Pedis Pulse    Left Lower extremity :    Skin is Warm and Well perfused, no suggestion of infection.  Mild effusion    Incisions well-healed without signs or symptoms of infection    Good range of motion -5 to 130 degrees    Lachman's and anterior drawer intact, posterior drawer intact    Positive lateral>medial joint line pain, less than other side    EHL/FHL/TA/GS 5/5    Sensation intact L3-S1    2+ Dorsalis Pedis Pulse      IMAGING:    Plain Radiographs: 4 views of bilateral knees were obtained on 11/29/21 and reviewed today 12/13/21.  They demonstrate:  -post-operative changes associated with ACL reconstructions on bilateral knees  -mild medial>lateral/patellofemoral compartmental changes    MRI:  -MRI right knee non-contrast obtained 12/12/21 and reviewed today 12/13/21 demonstrate:  -Mild effusion, intact ACL, tricompartmental arthritic changes    -MRI left knee non-contrast obtained 12/12/21 and reviewed today 12/13/21 demonstrate:  -Mild effusion, intact ACL, tricompartmental arthritic changes  -Don't visualize lateral meniscal  tear    ASSESSMENT:  1. Post-traumatic arthritis, bilateral knees, tricompartmental    PLAN:    We discussed the diagnosis, prognosis, and treatment options.  We discussed non-operative versus operative intervention.    Non-operative intervention would consist of activity modification, NSAIDs, PT, injections.    Operative intervention would consist of arthroscopy with chondroplasty.  Post-operative restrictions would include no running/jumping for 6 weeks, no brace, weight-bearing as tolerated.  We discussed risks, benefits, and alternatives to the procedure.  Risks include, but are not limited to, infection, damage to surrounding structures, and worsening or lack of improvement.    The patient understands and agrees.  They would like to proceed with non-operative intervention at this time.  She would like to proceed with an injections today.  All of their questions were answered.    She can follow-up on an as needed basis.    The patient was seen and examined with Dr. Pena, who agrees with the assessment and plan as above.      Patient seen and examined with the Fellow.     Assesment: Status post right ACL reconstruction 7 years ago.  ACL graft intact.  Knee stable    Right intermittent swelling and pain every 2 to 3 weeks    Status post left ACL reconstruction 1999 and 2 subsequent chondroplasties, currently the site is doing well    Plan: I reviewed her MRIs today I had a long discussion today with the patient.  At this time her knees are stable and I feel like both of her ACL grafts are working well.    I think largely her diagnosis is posttraumatic arthritis of her knee.  She does have areas of cartilage wear particular in the patellofemoral compartment as well as the medial compartment.    When I review her MRIs I do not feel like a very good surgical objectives for either side    I discussed with her about nonsurgical intervention including time, relative rest, activity modification as well as oral  anti-inflammatories.  I told her that she had a standing offer for prescription strength anti-inflammatory whenever she needs.  She declined at this time.  I did discuss the possibility having an injection.  She would like to try this.  She is currently improving from her most recent flare but our thought is to hopefully use this injection to decrease the frequency of these flares.    After written informed consent obtained and signed, after sufficient prepping and sterile technique, 40 mg of kenalog and , 8 cc of 1% lidocaine were injected without complication into the right knee. The patient tolerated the injection well and a sterile dressing was applied.       She can follow-up with me on an as-needed basis.  She is a candidate for repeat injection as necessary.  She can probably get 2 to 3/year.  I do think that it if she fails nonsurgical intervention we could consider surgery which would be an arthroscopic chondroplasty and debridement however she I would want to make sure that she understood the limitations of the surgery and that it may not provide her lasting benefit.  At this time she should also understand that her knee is structurally sound and there are certain no requirements for surgery    I agree with history, physical and imaging as well as the assessment and plan as detailed by Dr. Brewster.         Large Joint Injection: R knee joint    Date/Time: 12/13/2021 8:38 AM  Performed by: Tye Pena MD  Authorized by: Tye Pena MD     Indications:  Pain  Needle Size:  22 G  Guidance: landmark guided    Approach:  Anterolateral  Location:  Knee      Medications:  40 mg triamcinolone 40 MG/ML; 8 mL lidocaine (PF) 0.5 %  Outcome:  Tolerated well, no immediate complications  Procedure discussed: discussed risks, benefits, and alternatives    Consent Given by:  Patient  Timeout: timeout called immediately prior to procedure    Prep: patient was prepped and draped in usual  sterile fashion          Again, thank you for allowing me to participate in the care of your patient.        Sincerely,        Tye Pena MD

## 2021-12-13 NOTE — PROGRESS NOTES
Patient seen and examined with the Fellow.     Assesment: Status post right ACL reconstruction 7 years ago.  ACL graft intact.  Knee stable    Right intermittent swelling and pain every 2 to 3 weeks    Status post left ACL reconstruction 1999 and 2 subsequent chondroplasties, currently the site is doing well    Plan: I reviewed her MRIs today I had a long discussion today with the patient.  At this time her knees are stable and I feel like both of her ACL grafts are working well.    I think largely her diagnosis is posttraumatic arthritis of her knee.  She does have areas of cartilage wear particular in the patellofemoral compartment as well as the medial compartment.    When I review her MRIs I do not feel like a very good surgical objectives for either side    I discussed with her about nonsurgical intervention including time, relative rest, activity modification as well as oral anti-inflammatories.  I told her that she had a standing offer for prescription strength anti-inflammatory whenever she needs.  She declined at this time.  I did discuss the possibility having an injection.  She would like to try this.  She is currently improving from her most recent flare but our thought is to hopefully use this injection to decrease the frequency of these flares.    After written informed consent obtained and signed, after sufficient prepping and sterile technique, 40 mg of kenalog and , 8 cc of 1% lidocaine were injected without complication into the right knee. The patient tolerated the injection well and a sterile dressing was applied.       She can follow-up with me on an as-needed basis.  She is a candidate for repeat injection as necessary.  She can probably get 2 to 3/year.  I do think that it if she fails nonsurgical intervention we could consider surgery which would be an arthroscopic chondroplasty and debridement however she I would want to make sure that she understood the limitations of the surgery and that  it may not provide her lasting benefit.  At this time she should also understand that her knee is structurally sound and there are certain no requirements for surgery    I agree with history, physical and imaging as well as the assessment and plan as detailed by Dr. Brewster.         Large Joint Injection: R knee joint    Date/Time: 12/13/2021 8:38 AM  Performed by: Tye Pena MD  Authorized by: Tye Pena MD     Indications:  Pain  Needle Size:  22 G  Guidance: landmark guided    Approach:  Anterolateral  Location:  Knee      Medications:  40 mg triamcinolone 40 MG/ML; 8 mL lidocaine (PF) 0.5 %  Outcome:  Tolerated well, no immediate complications  Procedure discussed: discussed risks, benefits, and alternatives    Consent Given by:  Patient  Timeout: timeout called immediately prior to procedure    Prep: patient was prepped and draped in usual sterile fashion

## 2021-12-13 NOTE — NURSING NOTE
"Reason For Visit:   Chief Complaint   Patient presents with     Consult     bilateral knee     Patient presents in clinic today for bilateral knee pain. Right is more symptomatic (pain and swelling) than left. Surgical history includes bilateral ACL reconstruction and two arthroscopy (left knee) and one arthroscopy (right knee).     Patient had bilateral knee MRI's completed on 12/12/21. Images are in system.     Pain Assessment  Patient Currently in Pain: Yes  0-10 Pain Scale: 2 (R > L)  Primary Pain Location: Knee    Ht 1.778 m (5' 10\")   Wt 93 kg (205 lb)   BMI 29.41 kg/m           Allergies   Allergen Reactions     Sulfa Drugs      Rash as a child       Current Outpatient Medications   Medication     clobetasol (TEMOVATE) 0.05 % external ointment     levothyroxine (SYNTHROID/LEVOTHROID) 175 MCG tablet     Loratadine (CLARITIN PO)     multivitamin w/minerals (MULTI-VITAMIN) tablet     Omega-3 1000 MG capsule     ondansetron (ZOFRAN) 4 MG tablet     No current facility-administered medications for this visit.         Elida Ramirez, ATC    "

## 2021-12-13 NOTE — NURSING NOTE
[address] [address]  Dept: [phone]  ______________________________________________________________________________    Patient: Kaitlyn Paul   : 1985   MRN: [id number]   2021    INVASIVE PROCEDURE SAFETY CHECKLIST    Date: 2021   Procedure: Right Knee kenalog injection   Patient Name: Kaitlyn Paul  MRN: [id number]  YOB: 1985    Action: Complete sections as appropriate. Any discrepancy results in a HARD COPY until resolved.     PRE PROCEDURE:  Patient ID verified with 2 identifiers (name and  or MRN): Yes  Procedure and site verified with patient/designee (when able): Yes  Accurate consent documentation in medical record: Yes  H&P (or appropriate assessment) documented in medical record: Yes  H&P must be up to 20 days prior to procedure and updates within 24 hours of procedure as applicable: NA  Relevant diagnostic and radiology test results appropriately labeled and displayed as applicable: NA  Procedure site(s) marked with provider initials: NA    TIMEOUT:  Time-Out performed immediately prior to starting procedure, including verbal and active participation of all team members addressing the following:Yes  * Correct patient identify  * Confirmed that the correct side and site are marked  * An accurate procedure consent form  * Agreement on the procedure to be done  * Correct patient position  * Relevant images and results are properly labeled and appropriately displayed  * The need to administer antibiotics or fluids for irrigation purposes during the procedure as applicable   * Safety precautions based on patient history or medication use    DURING PROCEDURE: Verification of correct person, site, and procedures any time the responsibility for care of the patient is transferred to another member of the care team.       Prior to injection, verified patient identity using  patient's name and date of birth.  Due to injection administration, patient instructed to remain in clinic for 15 minutes  afterwards, and to report any adverse reaction to me immediately.    Joint injection was performed.      Lido  Drug Amount Wasted:  Yes: 42 mg/ml   Vial/Syringe: Single dose vial  Expiration Date:  02/01/2022    Kenalog  Drug Amount Wasted:  No  Vial/Syringe: Single dose vial  Expiration Date: 06/01/2023    Elida Ramirez, ATC  December 13, 2021

## 2021-12-14 RX ORDER — LIDOCAINE HYDROCHLORIDE 5 MG/ML
8 INJECTION, SOLUTION INFILTRATION; INTRAVENOUS
Status: DISCONTINUED | OUTPATIENT
Start: 2021-12-13 | End: 2023-05-09

## 2021-12-14 RX ORDER — TRIAMCINOLONE ACETONIDE 40 MG/ML
40 INJECTION, SUSPENSION INTRA-ARTICULAR; INTRAMUSCULAR
Status: DISCONTINUED | OUTPATIENT
Start: 2021-12-13 | End: 2023-05-09

## 2021-12-20 ENCOUNTER — OFFICE VISIT (OUTPATIENT)
Dept: MIDWIFE SERVICES | Facility: CLINIC | Age: 36
End: 2021-12-20
Payer: COMMERCIAL

## 2021-12-20 VITALS
DIASTOLIC BLOOD PRESSURE: 70 MMHG | WEIGHT: 212.5 LBS | BODY MASS INDEX: 30.42 KG/M2 | HEIGHT: 70 IN | SYSTOLIC BLOOD PRESSURE: 113 MMHG | HEART RATE: 66 BPM

## 2021-12-20 DIAGNOSIS — O03.9 SPONTANEOUS ABORTION IN FIRST TRIMESTER: ICD-10-CM

## 2021-12-20 DIAGNOSIS — O02.1 MISSED ABORTION: ICD-10-CM

## 2021-12-20 DIAGNOSIS — N94.89 UTERINE CRAMPING: Primary | ICD-10-CM

## 2021-12-20 LAB
B-HCG SERPL-ACNC: 1 IU/L (ref 0–5)
CLUE CELLS: ABNORMAL
TRICHOMONAS, WET PREP: ABNORMAL
WBC'S/HIGH POWER FIELD, WET PREP: ABNORMAL
YEAST, WET PREP: ABNORMAL

## 2021-12-20 PROCEDURE — 84702 CHORIONIC GONADOTROPIN TEST: CPT | Performed by: ADVANCED PRACTICE MIDWIFE

## 2021-12-20 PROCEDURE — 36415 COLL VENOUS BLD VENIPUNCTURE: CPT | Performed by: ADVANCED PRACTICE MIDWIFE

## 2021-12-20 PROCEDURE — 99213 OFFICE O/P EST LOW 20 MIN: CPT | Performed by: ADVANCED PRACTICE MIDWIFE

## 2021-12-20 PROCEDURE — 87210 SMEAR WET MOUNT SALINE/INK: CPT | Performed by: ADVANCED PRACTICE MIDWIFE

## 2021-12-20 ASSESSMENT — MIFFLIN-ST. JEOR: SCORE: 1724.14

## 2021-12-20 NOTE — PROGRESS NOTES
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  Kaitlyn Paul has been having irregular but persistent uterine cramping that will last all day and is not effected by use of NSAIDS.  Some brown spotting at times with the pain.   Pain is worse than menses with more of a sharpness and more adnexa than midline on the Left.   Is not having the pain today.        Review Of Systems  Skin: negative  Eyes: negative  Ears/Nose/Throat: negative  Respiratory: No shortness of breath, dyspnea on exertion, cough, or hemoptysis  Cardiovascular: negative  Gastrointestinal: negative  Genitourinary: negative  Musculoskeletal: negative  Neurologic: negative  Psychiatric: negative  Hematologic/Lymphatic/Immunologic: negative  Endocrine: negative    Past Medical History:   Diagnosis Date    Arthritis     Dry eye     Encounter for IUD insertion 2020    Mirena inserted by Dr Barnett    Encounter for IUD removal 2018    [id number] and then again [id number]    History of recurrent miscarriages     x 4    Papillary thyroid carcinoma (H) 2014    Post-surgical hypothyroidism 2014       Past Surgical History:   Procedure Laterality Date    C REPAIR CRUCIATE LIGAMENT,KNEE      left    central neck dissection  2014    D & C      2018, 2019, 2020    HC KNEE SCOPE,MED/LAT MENISECTOMY      HC REMOVAL GALLBLADDER      lap     near total thyroidectomy  2014    ZZC NONSPECIFIC PROCEDURE  2007    lipoma excision from her back       Family History   Problem Relation Age of Onset    Graves' disease Mother     Thyroid Disease Mother     Diabetes Father      Cerebrovascular Disease Maternal Grandmother     Cardiovascular Maternal Grandmother     Colon Cancer Maternal Grandmother     Cardiovascular Maternal Grandfather     Gastrointestinal Disease Paternal Grandmother         celiac    Cardiovascular Paternal Grandfather     No Known Problems Sister     No Known Problems Brother     No Known Problems Other     Thyroid Cancer No family hx of        Social History     Socioeconomic History    Marital status:      Spouse name: Not on file    Number of children: 1    Years of education: Not on file    Highest education level: Professional school degree (e.g., MD, DDS, DVM, ELLEN)   Occupational History    Occupation:    Tobacco Use    Smoking status: Never Smoker    Smokeless tobacco: Never Used   Substance and Sexual Activity    Alcohol use: No    Drug use: No    Sexual activity: Yes     Partners: Male     Birth control/protection: None   Other Topics Concern    Not on file   Social History Narrative    Not on file     Social Determinants of Health     Financial Resource Strain: Not on file   Food Insecurity: Not on file   Transportation Needs: Not on file   Physical Activity: Not on file   Stress: Not on file   Social Connections: Not on file   Intimate Partner Violence: Not on file   Housing Stability: Not on file         Current Outpatient Medications:     clobetasol (TEMOVATE) 0.05 % external ointment, Apply topically 2 times daily, Disp: 60 g, Rfl: 11    levothyroxine (SYNTHROID/LEVOTHROID) 175 MCG tablet, MON to SAT 1 tablet/day; SUN 1.5 tablet, Disp: 96 tablet, Rfl: 4    Loratadine (CLARITIN PO), , Disp: , Rfl:     multivitamin w/minerals (MULTI-VITAMIN) tablet, Take 1 tablet by mouth daily, Disp: , Rfl:     Omega-3 1000 MG capsule, Take 1 g by mouth daily, Disp: , Rfl:     Current Facility-Administered Medications:     lidocaine (PF) 0.5 % injection SOLN 8 mL, 8 mL, , , Tye Pena MD, 8 mL at 12/13/21 0838    triamcinolone (KENALOG-40)  "injection 40 mg, 40 mg, , , Tye Pena MD, 40 mg at 21 0838    Allergies   Allergen Reactions    Sulfa Drugs      Rash as a child       Exam:  Vitals:    21 1344   BP: 113/70   Pulse: 66   Weight: 96.4 kg (212 lb 8 oz)   Height: 1.778 m (5' 10\")     Body mass index is 30.49 kg/m .     Exam:  Constitutional: healthy, alert, no distress and cooperative  Head: Normocephalic. No masses, lesions, tenderness or abnormalities  Gastrointestinal: Abdomen soft, non-tender. BS normal. No masses, organomegaly  : Normal external genitalia without lesions  Pelvic:  Spec exam with small amount of brown mucus at the os removed with Carbone swab.   Bimanual exam is normal with no pain in adnexa or motion.    Musculoskeletal: extremities normal- no gross deformities noted, gait normal, and normal muscle tone  Skin: no suspicious lesions or rashes  Neurologic: Gait normal. Reflexes normal and symmetric. Sensation grossly WNL.  Psychiatric: mentation appears normal, affect normal/bright, and expression of concern by provider to pt on this and past losses.  Hematologic/Lymphatic/Immunologic: Negative    A/P:  36 year old  with uterine cramping post AB of embryonic demise via Cytotec.        Wet prep obtained and is negative.    Bimanual is nl without pain.  Quant repeated to ensure is negative.     Pelvic ultrasound if not resolved over next month but no indication of infection.    Cramping related to thicken endometrium that will resolve with next normal menses in absence of retained POC  "

## 2022-01-15 ENCOUNTER — HEALTH MAINTENANCE LETTER (OUTPATIENT)
Age: 37
End: 2022-01-15

## 2022-01-26 ENCOUNTER — MYC MEDICAL ADVICE (OUTPATIENT)
Dept: ENDOCRINOLOGY | Facility: CLINIC | Age: 37
End: 2022-01-26

## 2022-01-31 DIAGNOSIS — C73 PAPILLARY THYROID CARCINOMA (H): ICD-10-CM

## 2022-01-31 DIAGNOSIS — E89.0 POST-SURGICAL HYPOTHYROIDISM: ICD-10-CM

## 2022-02-03 RX ORDER — LEVOTHYROXINE SODIUM 175 UG/1
TABLET ORAL
Qty: 96 TABLET | Refills: 4 | OUTPATIENT
Start: 2022-02-03

## 2022-02-19 ENCOUNTER — TRANSFERRED RECORDS (OUTPATIENT)
Dept: HEALTH INFORMATION MANAGEMENT | Facility: CLINIC | Age: 37
End: 2022-02-19

## 2022-03-02 ENCOUNTER — TELEPHONE (OUTPATIENT)
Dept: FAMILY MEDICINE | Facility: CLINIC | Age: 37
End: 2022-03-02

## 2022-03-02 NOTE — TELEPHONE ENCOUNTER
Received as a priority call.    [place] The provider there told her to have a follow up x-ray in 3 weeks. This would be the week of March 14th. [facility] but does not have a PCP. She is willing to see any provider.    Gave patient the phone number for the [facility] to call and schedule.    Rebecca Wells RN  [facility]

## 2022-03-11 ENCOUNTER — OFFICE VISIT (OUTPATIENT)
Dept: FAMILY MEDICINE | Facility: CLINIC | Age: 37
End: 2022-03-11
Payer: COMMERCIAL

## 2022-03-11 ENCOUNTER — ANCILLARY PROCEDURE (OUTPATIENT)
Dept: GENERAL RADIOLOGY | Facility: CLINIC | Age: 37
End: 2022-03-11
Attending: INTERNAL MEDICINE
Payer: COMMERCIAL

## 2022-03-11 VITALS
TEMPERATURE: 97 F | SYSTOLIC BLOOD PRESSURE: 111 MMHG | HEIGHT: 70 IN | WEIGHT: 215 LBS | BODY MASS INDEX: 30.78 KG/M2 | HEART RATE: 66 BPM | OXYGEN SATURATION: 99 % | DIASTOLIC BLOOD PRESSURE: 60 MMHG | RESPIRATION RATE: 16 BRPM

## 2022-03-11 DIAGNOSIS — S92.512D CLOSED DISPLACED FRACTURE OF PROXIMAL PHALANX OF LESSER TOE OF LEFT FOOT WITH ROUTINE HEALING, SUBSEQUENT ENCOUNTER: Primary | ICD-10-CM

## 2022-03-11 DIAGNOSIS — S92.512D CLOSED DISPLACED FRACTURE OF PROXIMAL PHALANX OF LESSER TOE OF LEFT FOOT WITH ROUTINE HEALING, SUBSEQUENT ENCOUNTER: ICD-10-CM

## 2022-03-11 PROCEDURE — 99213 OFFICE O/P EST LOW 20 MIN: CPT | Performed by: INTERNAL MEDICINE

## 2022-03-11 PROCEDURE — 73660 X-RAY EXAM OF TOE(S): CPT | Mod: LT | Performed by: RADIOLOGY

## 2022-03-11 ASSESSMENT — PAIN SCALES - GENERAL: PAINLEVEL: NO PAIN (0)

## 2022-03-11 NOTE — PROGRESS NOTES
"  Assessment & Plan     Closed displaced fracture of proximal phalanx of lesser toe of left foot with routine healing, subsequent encounter  3 weeks ago  Recheck xray  If worsening or not improving, refer to podiatrist  - XR Toe Left G/E 2 Views      Return if symptoms worsen or fail to improve.    DO SIA Mcintosh [facility] HERSON Alonso is a 36 year old who presents for the following health issues     History of Present Illness       Reason for visit:  Broken toe  Symptom onset:  3-4 weeks ago  Symptoms include:  Pain, swelling  Symptom intensity:  Moderate  Symptom progression:  Improving  Had these symptoms before:  No  What makes it worse:  No  What makes it better:  No    She eats 4 or more servings of fruits and vegetables daily.She consumes 0 sweetened beverage(s) daily.She exercises with enough effort to increase her heart rate 30 to 60 minutes per day.  She exercises with enough effort to increase her heart rate 4 days per week.   She is taking medications regularly.     PCP: other    2/19/2022 was in [place], [place] and was walking, kicked boardwalk with foot, injured left pinky toe.   Was swollen and discolored then  Xray was done-she brought in records: \"Mildly displaced 5th proximal phalanx fracture\"    Reports wearing ortho boot x three weeks now. Overall pain better except the other day put weight on toe and greatly increased pain. States swelling and discoloration better.       Review of Systems   Constitutional, HEENT, cardiovascular, pulmonary, gi and gu systems are negative, except as otherwise noted.      Objective    /60 (BP Location: Left arm, Patient Position: Sitting, Cuff Size: Adult Regular)   Pulse 66   Temp 97  F (36.1  C) (Temporal)   Resp 16   Ht 1.778 m (5' 10\")   Wt 97.5 kg (215 lb)   SpO2 99%   BMI 30.85 kg/m    Body mass index is 30.85 kg/m .  Physical Exam     GENERAL APPEARANCE: AAOx3, no distress. Well developed.    FOOT: Left fifth toe " without discoloration, mild edema    PSYCH: appropriate mood and affect.

## 2022-03-14 ENCOUNTER — VIRTUAL VISIT (OUTPATIENT)
Dept: ENDOCRINOLOGY | Facility: CLINIC | Age: 37
End: 2022-03-14
Payer: COMMERCIAL

## 2022-03-14 DIAGNOSIS — C73 PAPILLARY THYROID CARCINOMA (H): Primary | ICD-10-CM

## 2022-03-14 DIAGNOSIS — E89.0 POST-SURGICAL HYPOTHYROIDISM: ICD-10-CM

## 2022-03-14 PROCEDURE — 99214 OFFICE O/P EST MOD 30 MIN: CPT | Mod: 95

## 2022-03-14 NOTE — LETTER
3/14/2022       RE: Kaitlyn Paul  [address] [place] [facility] [address]     Dear Colleague,    Thank you for referring your patient, Kaitlyn Paul, to the [facility] [place] at [facility]. Please see a copy of my visit note below.    Endocrine Consult Video visit note-     Attending Assessment/Plan :     1.  Papillary thyroid carcinoma, 3.2 cm left with ETE, + 5 LN positive. She has been treated with near total Tx and CND, 103 mCi 131.   Reduce US frequency - next 9/2023 or thereafter  Yearly labs are due in July 2022     2.  Post surgical hypothyroidism.  Treat to target TSH < 0.4.  on LT4 1175 x 7.5/week    Due to the COVID 19 pandemic this visit was a video visit. The patient gave verbal consent for the visit today.    I have independently reviewed and interpreted labs, imaging as indicated.     Chart review/prep time 1  [id number]  Visit Start time 1341   Visit Stop time  1347  21__ minutes spent on the date of the encounter doing chart review, history and exam, documentation and further activities as noted above.    Chief complaint: HISTORY OF PRESENT ILLNESS    Kaitlyn presents for follow up of papillary thyroid carcinoma, post surgical hypothyroidism.  I last saw her 10/2020. At that time she was on LT4 188 mc/gday.  On 11/3/2021 we changed the LT4 to 175 x 7.5/week (which averages 187.5 mcg/day) . She already had neck US in anticipation of this appt.      The thyroid cancer treatment has been as follows:   2/4/14 Near total thyroidectomy with central lymph node dissection by Dr Sandra Mims at .  3.2 cm PTC left with ETE but no LV invasion.  5/21 LN were positive , larges tumor deposit in LN 0.4 cm.    4/11/14 103 mCi 131I    We have the following labs  1/30/14 25OHD 18, PTH 29, Ca 9.4,   2/4/14 PTH 11  2/6/14 PTH 14  2/20/14: PTH 14, Ca 9.5  4/11/14: Tg 1.8, LOR < 1, .7, PTH 42  Radioiodine 103  mCi  12/4/14: Tg 0.3, LOR < 1, TSH 21.8  8/1/16: Tg < 0.1, TSH 0.44  4/29/17: Tg 0.1, LOR < 1, TSH 0.27  9/14/18 Tg < 0.1, LOR < 1, TPO 32  3/29/19: 25OHD 36  9/6/19 Tg < 0.1, LOR < 1, TSH 0.23, free  T4 2.03  9/17/19 TSH 0.28  6/22/20 TSH 3.31  6/24/2020 Tg 0.1 (Access), LOR < 1 (Access), TSH 2.01, free T4 1.73  7/24/2020 [place]  TSH 1.567  10/12/2020 Tg < 0.1, LOR < 0.4, TSh 0.21  7/27/2021 Tg < 0.1, LOR < 0.4, TSH 0.47, free T4 1.25.   9/24/2021 TSH 0.21  10/25/2021 TSH 0.6    Imaging  12/11/13 CT Doc  12/20/13 US neck  4/11/14 103 mCi 131I  4/21/14 131I post therapy TBS: negative  12/4/14 4.06 mCi 123I TBS: negative  8/23/16 US neck  8/14/17 US thyroid  8/20/18 US neck   8/13/19 thyroid US [facility]-- we do not have the images from this study, only the report is viewable on care everywhere   9/24/2021 neck US: compared with  9/14/2020 neck US as read by me again today.    Left level 4 #1  0.7 x 0.5 x 0.6 cm  was 0.6 x 0.5 x 0.6 cm     REVIEW OF SYSTEMS  Feels normal   Sleep OK  Cardiac negative  Respiratory negative  Menses monthly .  Last pregnancy 6 months ago ended in miscarriage.      Past Medical History  Past Medical History:   Diagnosis Date     Arthritis      Dry eye      Encounter for IUD insertion 12/11/2020    Mirena inserted by Dr Barnett     Encounter for IUD removal 2018    [id number] and then again [id number]     History of recurrent miscarriages     x 4     Papillary thyroid carcinoma (H) 02/2014     Post-surgical hypothyroidism 02/2014     Past Surgical History:   Procedure Laterality Date     central neck dissection  02/04/2014     D & C      8/2018, 7/2019, 8/2020     HC KNEE SCOPE,MED/LAT MENISECTOMY  2009     HC REMOVAL GALLBLADDER  2005    lap      near total thyroidectomy  02/04/2014     Z NONSPECIFIC PROCEDURE  2007    lipoma excision from her back     Z REPAIR CRUCIATE LIGAMENT,KNEE  1999    left       Medications  Current Outpatient Medications   Medication Sig Dispense Refill      "clobetasol (TEMOVATE) 0.05 % external ointment Apply topically 2 times daily 60 g 11     levothyroxine (SYNTHROID/LEVOTHROID) 175 MCG tablet MON to SAT 1 tablet/day; SUN 1.5 tablet 96 tablet 4     Loratadine (CLARITIN PO)        multivitamin w/minerals (MULTI-VITAMIN) tablet Take 1 tablet by mouth daily       Omega-3 1000 MG capsule Take 1 g by mouth daily         Allergies  Allergies   Allergen Reactions     Sulfa Drugs      Rash as a child       Family History  family history includes Cardiovascular in her maternal grandfather, maternal grandmother, and paternal grandfather; Cerebrovascular Disease in her maternal grandmother; Colon Cancer in her maternal grandmother; Diabetes in her father; Gastrointestinal Disease in her paternal grandmother; Graves' disease in her mother; No Known Problems in her brother, sister, and another family member; Thyroid Disease in her mother.    Social History  Social History     Tobacco Use     Smoking status: Never Smoker     Smokeless tobacco: Never Used   Substance Use Topics     Alcohol use: No     Drug use: No     Has one child     Physical Exam  There were no vitals taken for this visit.  There is no height or weight on file to calculate BMI.   BP Readings from Last 1 Encounters:   03/11/22 111/60      Pulse Readings from Last 1 Encounters:   03/11/22 66      Resp Readings from Last 1 Encounters:   03/11/22 16      Temp Readings from Last 1 Encounters:   03/11/22 97  F (36.1  C) (Temporal)      SpO2 Readings from Last 1 Encounters:   03/11/22 99%      Wt Readings from Last 1 Encounters:   03/11/22 97.5 kg (215 lb)      Ht Readings from Last 1 Encounters:   03/11/22 1.778 m (5' 10\")     GENERAL :  Young woman In no apparent distress. I can see from mid trunk up  EYES: Eyes grossly normal to inspection  RESP: No audible wheeze, cough, or visible cyanosis.  No visible retractions or increased work of breathing.    SKIN: Visible skin clear.   NEURO:  Mentation and speech " appropriate .  PSYCH: Mentation appears normal, affect normal, normal speech and appearance well-groomed.     DATA REVIEW    ENDO THYROID LABS-[facility] Latest Ref Rng & Units 10/25/2021 9/24/2021   TSH 0.40 - 4.00 mU/L 0.60 0.21 (L)   T4 4.5 - 13.9 ug/dL 12.0 12.0   FREE T4 0.76 - 1.46 ng/dL       ENDO THYROID LABS-[facility] Latest Ref Rng & Units 7/27/2021 10/12/2020   TSH 0.40 - 4.00 mU/L 0.47 0.21 (L)   T4 4.5 - 13.9 ug/dL  15.0 (H)   FREE T4 0.76 - 1.46 ng/dL 1.25 1.51 (H)     ENDO THYROID LABS-[facility] Latest Ref Rng & Units 6/22/2020   TSH 0.40 - 4.00 mU/L 3.310   T4 4.5 - 13.9 ug/dL    FREE T4 0.76 - 1.46 ng/dL      US HEAD NECK SOFT TISSUE on 9/24/2021 9:10 AM.     INDICATION: Papillary thyroid carcinoma (H); Post-surgical  hypothyroidism.     COMPARISON: Ultrasound dated 9/14/2020     FINDINGS:   Grayscale and color Doppler ultrasound of the cervical lymph nodes.     Right:  Level 2:   Node #1: Measures 1.2 x 0.7 x 2.2 cm, not significant changed. Normal  fatty hilum, reniform shape and hilar blood flow. Likely benign.  Node #2: Measures 1.1 x 0.5 x 1.6 cm. Normal fatty hilum, reniform  shape and hilar blood flow. Likely benign.  Level 3: No nodes.  Level 4: No nodes.  Level 5: No nodes.  Level 6: No nodes. No suspicious soft tissue within the thyroidectomy  bed.  Level 7: No nodes.     Left:  Level 2:  Node #1: Measures 1.2 x 0.6 x 1.6 cm. Normal fatty hilum,  reniform-shaped and color Doppler flow. Likely benign.  Node #2: Measures 1.3 x 0.5 x 2.4 cm, unchanged. Normal reniform  shape, fatty hilum and hilar blood flow. Likely benign.  Level 3: No nodes.  Level 4: Node  measuring 0.7 x 0.5 x 0.6 cm, previously 0.6 x 0.5 x  0.6 cm. Normal fatty hilum, reniform shape and hilar blood flow.  Level 5: No nodes.  Level 6: No nodes. No suspicious soft tissue within the thyroidectomy  bed.  Level 7: No nodes.                                                                      IMPRESSION: Cervical lymph node with measurements as  above.No  suspicious cervical lymphadenopathy or residual soft tissue within the  thyroidectomy bed.     I have personally reviewed the examination and initial interpretation  and I agree with the findings.     MD Kaitlyn FLOWERS  is being evaluated via a billable video visit.      How would you like to obtain your AVS? MyChart  For the video visit, send the invitation by: Text to cell phone: [phone]  Will anyone else be joining your video visit? No    Giovana Juan Diego on 3/14/2022 at 1:28 PM        Again, thank you for allowing me to participate in the care of your patient.      Sincerely,    Ghislaine Streeter MD

## 2022-03-14 NOTE — PROGRESS NOTES
Endocrine Consult Video visit note-     Attending Assessment/Plan :     1.  Papillary thyroid carcinoma, 3.2 cm left with ETE, + 5 LN positive. She has been treated with near total Tx and CND, 103 mCi 131.   Reduce US frequency - next 9/2023 or thereafter  Yearly labs are due in July 2022     Addendum  7/29/2022 TSH 0.4, free T4 1.44, Tg < 0.1, LOR < 0.4 .  9/17/2022 TSH 1.45, total T4 9.9 - pregnancy early lst trimester  10/17/22 TSH 0.66, total T4 9.5   3/9/23 TSH 1.26, total T4 10.3     2.  Post surgical hypothyroidism.  Treat to target TSH < 0.4.  on LT4 175 x 7.5/week    Due to the COVID 19 pandemic this visit was a video visit. The patient gave verbal consent for the visit today.    I have independently reviewed and interpreted labs, imaging as indicated.     Chart review/prep time 1  [id number]  Visit Start time 1341   Visit Stop time  1347  21__ minutes spent on the date of the encounter doing chart review, history and exam, documentation and further activities as noted above.    Chief complaint: HISTORY OF PRESENT ILLNESS    Kaitlyn presents for follow up of papillary thyroid carcinoma, post surgical hypothyroidism.  I last saw her 10/2020. At that time she was on LT4 188 mc/gday.  On 11/3/2021 we changed the LT4 to 175 x 7.5/week (which averages 187.5 mcg/day) . She already had neck US in anticipation of this appt.      The thyroid cancer treatment has been as follows:   2/4/14 Near total thyroidectomy with central lymph node dissection by Dr Sandra Mims at .  3.2 cm PTC left with ETE but no LV invasion.  5/21 LN were positive , larges tumor deposit in LN 0.4 cm.    4/11/14 103 mCi 131I    We have the following labs  1/30/14 25OHD 18, PTH 29, Ca 9.4,   2/4/14 PTH 11  2/6/14 PTH 14  2/20/14: PTH 14, Ca 9.5  4/11/14: Tg 1.8, LOR < 1, .7, PTH 42  Radioiodine 103 mCi  12/4/14: Tg 0.3, LOR < 1, TSH 21.8  8/1/16: Tg < 0.1, TSH 0.44  4/29/17: Tg 0.1, LOR < 1, TSH 0.27  9/14/18 Tg < 0.1, LOR < 1,  TPO 32  3/29/19: 25OHD 36  9/6/19 Tg < 0.1, LOR < 1, TSH 0.23, free  T4 2.03  9/17/19 TSH 0.28  6/22/20 TSH 3.31  6/24/2020 Tg 0.1 (Access), LOR < 1 (Access), TSH 2.01, free T4 1.73  7/24/2020 [place]  TSH 1.567  10/12/2020 Tg < 0.1, LOR < 0.4, TSh 0.21  7/27/2021 Tg < 0.1, LOR < 0.4, TSH 0.47, free T4 1.25.   9/24/2021 TSH 0.21  10/25/2021 TSH 0.6    Imaging  12/11/13 CT Doc  12/20/13 US neck  4/11/14 103 mCi 131I  4/21/14 131I post therapy TBS: negative  12/4/14 4.06 mCi 123I TBS: negative  8/23/16 US neck  8/14/17 US thyroid  8/20/18 US neck   8/13/19 thyroid US [facility]-- we do not have the images from this study, only the report is viewable on care everywhere   9/24/2021 neck US: compared with  9/14/2020 neck US as read by me again today.    Left level 4 #1  0.7 x 0.5 x 0.6 cm  was 0.6 x 0.5 x 0.6 cm     REVIEW OF SYSTEMS  Feels normal   Sleep OK  Cardiac negative  Respiratory negative  Menses monthly .  Last pregnancy 6 months ago ended in miscarriage.      Past Medical History  Past Medical History:   Diagnosis Date     Arthritis      Dry eye      Encounter for IUD insertion 12/11/2020    Mirena inserted by Dr Barnett     Encounter for IUD removal 2018 2011-2015 and then again [id number]     History of recurrent miscarriages     x 4     Papillary thyroid carcinoma (H) 02/2014     Post-surgical hypothyroidism 02/2014     Past Surgical History:   Procedure Laterality Date     central neck dissection  02/04/2014     D & C      8/2018, 7/2019, 8/2020     HC KNEE SCOPE,MED/LAT MENISECTOMY  2009     HC REMOVAL GALLBLADDER  2005    lap      near total thyroidectomy  02/04/2014     Z NONSPECIFIC PROCEDURE  2007    lipoma excision from her back     ZC REPAIR CRUCIATE LIGAMENT,KNEE  1999    left       Medications  Current Outpatient Medications   Medication Sig Dispense Refill     clobetasol (TEMOVATE) 0.05 % external ointment Apply topically 2 times daily 60 g 11     levothyroxine (SYNTHROID/LEVOTHROID) 175 MCG  "tablet MON to SAT 1 tablet/day; SUN 1.5 tablet 96 tablet 4     Loratadine (CLARITIN PO)        multivitamin w/minerals (MULTI-VITAMIN) tablet Take 1 tablet by mouth daily       Omega-3 1000 MG capsule Take 1 g by mouth daily         Allergies  Allergies   Allergen Reactions     Sulfa Drugs      Rash as a child       Family History  family history includes Cardiovascular in her maternal grandfather, maternal grandmother, and paternal grandfather; Cerebrovascular Disease in her maternal grandmother; Colon Cancer in her maternal grandmother; Diabetes in her father; Gastrointestinal Disease in her paternal grandmother; Graves' disease in her mother; No Known Problems in her brother, sister, and another family member; Thyroid Disease in her mother.    Social History  Social History     Tobacco Use     Smoking status: Never Smoker     Smokeless tobacco: Never Used   Substance Use Topics     Alcohol use: No     Drug use: No     Has one child     Physical Exam  There were no vitals taken for this visit.  There is no height or weight on file to calculate BMI.   BP Readings from Last 1 Encounters:   03/11/22 111/60      Pulse Readings from Last 1 Encounters:   03/11/22 66      Resp Readings from Last 1 Encounters:   03/11/22 16      Temp Readings from Last 1 Encounters:   03/11/22 97  F (36.1  C) (Temporal)      SpO2 Readings from Last 1 Encounters:   03/11/22 99%      Wt Readings from Last 1 Encounters:   03/11/22 97.5 kg (215 lb)      Ht Readings from Last 1 Encounters:   03/11/22 1.778 m (5' 10\")     GENERAL :  Young woman In no apparent distress. I can see from mid trunk up  EYES: Eyes grossly normal to inspection  RESP: No audible wheeze, cough, or visible cyanosis.  No visible retractions or increased work of breathing.    SKIN: Visible skin clear.   NEURO:  Mentation and speech appropriate .  PSYCH: Mentation appears normal, affect normal, normal speech and appearance well-groomed.     DATA REVIEW    ENDO THYROID " LABS-[facility] Latest Ref Rng & Units 10/25/2021 9/24/2021   TSH 0.40 - 4.00 mU/L 0.60 0.21 (L)   T4 4.5 - 13.9 ug/dL 12.0 12.0   FREE T4 0.76 - 1.46 ng/dL       ENDO THYROID LABS-[facility] Latest Ref Rng & Units 7/27/2021 10/12/2020   TSH 0.40 - 4.00 mU/L 0.47 0.21 (L)   T4 4.5 - 13.9 ug/dL  15.0 (H)   FREE T4 0.76 - 1.46 ng/dL 1.25 1.51 (H)     ENDO THYROID LABS-[facility] Latest Ref Rng & Units 6/22/2020   TSH 0.40 - 4.00 mU/L 3.310   T4 4.5 - 13.9 ug/dL    FREE T4 0.76 - 1.46 ng/dL      US HEAD NECK SOFT TISSUE on 9/24/2021 9:10 AM.     INDICATION: Papillary thyroid carcinoma (H); Post-surgical  hypothyroidism.     COMPARISON: Ultrasound dated 9/14/2020     FINDINGS:   Grayscale and color Doppler ultrasound of the cervical lymph nodes.     Right:  Level 2:   Node #1: Measures 1.2 x 0.7 x 2.2 cm, not significant changed. Normal  fatty hilum, reniform shape and hilar blood flow. Likely benign.  Node #2: Measures 1.1 x 0.5 x 1.6 cm. Normal fatty hilum, reniform  shape and hilar blood flow. Likely benign.  Level 3: No nodes.  Level 4: No nodes.  Level 5: No nodes.  Level 6: No nodes. No suspicious soft tissue within the thyroidectomy  bed.  Level 7: No nodes.     Left:  Level 2:  Node #1: Measures 1.2 x 0.6 x 1.6 cm. Normal fatty hilum,  reniform-shaped and color Doppler flow. Likely benign.  Node #2: Measures 1.3 x 0.5 x 2.4 cm, unchanged. Normal reniform  shape, fatty hilum and hilar blood flow. Likely benign.  Level 3: No nodes.  Level 4: Node  measuring 0.7 x 0.5 x 0.6 cm, previously 0.6 x 0.5 x  0.6 cm. Normal fatty hilum, reniform shape and hilar blood flow.  Level 5: No nodes.  Level 6: No nodes. No suspicious soft tissue within the thyroidectomy  bed.  Level 7: No nodes.                                                                      IMPRESSION: Cervical lymph node with measurements as above.No  suspicious cervical lymphadenopathy or residual soft tissue within the  thyroidectomy bed.     I have personally reviewed the  examination and initial interpretation  and I agree with the findings.     NORMA JUAREZ MD

## 2022-03-14 NOTE — NURSING NOTE
Pt states there are no changes to their allergy and medication list since last reviewed on 3/11/22.Giovana Adamson on 3/14/2022 at 1:27 PM

## 2022-03-14 NOTE — PROGRESS NOTES
Kaitlyn Paul  is being evaluated via a billable video visit.      How would you like to obtain your AVS? [employer]  For the video visit, send the invitation by: Text to cell phone: [phone]  Will anyone else be joining your video visit? Gretta Adamson on 3/14/2022 at 1:28 PM

## 2022-03-14 NOTE — LETTER
Date:March 15, 2022      Patient was self referred, no letter generated. Do not send.        [facility] Health Information

## 2022-03-14 NOTE — PATIENT INSTRUCTIONS
Yearly thyroid labs in July 2022.  I will send results on Decalog when they come through.  Notify us immediately if pregnancy occurs.    See me approximately yearly     Next neck US 9/2023 or thereafter

## 2022-03-27 ENCOUNTER — E-VISIT (OUTPATIENT)
Dept: URGENT CARE | Facility: CLINIC | Age: 37
End: 2022-03-27
Payer: COMMERCIAL

## 2022-03-27 DIAGNOSIS — J21.9 ACUTE BRONCHIOLITIS, UNSPECIFIED: Primary | ICD-10-CM

## 2022-03-27 PROCEDURE — 99421 OL DIG E/M SVC 5-10 MIN: CPT | Performed by: PHYSICIAN ASSISTANT

## 2022-03-27 RX ORDER — GUAIFENESIN 1200 MG/1
1200 TABLET, EXTENDED RELEASE ORAL 2 TIMES DAILY
Qty: 60 TABLET | Refills: 0 | Status: SHIPPED | OUTPATIENT
Start: 2022-03-27 | End: 2022-12-16

## 2022-03-27 RX ORDER — BENZONATATE 100 MG/1
100 CAPSULE ORAL 3 TIMES DAILY PRN
Qty: 30 CAPSULE | Refills: 0 | Status: SHIPPED | OUTPATIENT
Start: 2022-03-27 | End: 2022-12-16

## 2022-03-27 NOTE — PATIENT INSTRUCTIONS
"  Dear Kaitlyn Paul    After reviewing your responses, I've been able to diagnose you with \"Bronchitis\" which is a common infection of your lungs that is nearly always caused by a virus. The virus causes swelling and irritation of the air passages of your lungs which leads to cough. The illness spreads from your nose and throat to your windpipe and airways. It is often called a \"chest cold\" and can last up to 2 weeks, but is not a serious illness. Exposure to cigarette smoke usually makes this significantly worse.      To treat bronchitis, the main thing to do is drink lots of fluids and rest. Cough medications over-the-counter such as mucinex, robitussin or \"cold and sinus\" medications can be helpful. Ibuprofen and Tylenol also help with fevers or aching feelings that you often have with this kind of illness. Do not take ibuprofen if you have kidney disease, stomach ulcers or allergy to aspirin.     Bronchitis is most often highly contagious as viruses are spread through the air or touch. Avoid contact with others who may become infected, particularly children, the elderly and those whose immune systems might be weak.     If symptoms are present greater than 10 days we consider giving antibiotics for sinus infections or bronchitis. I have sent in a medication for cough and a medication to help thin the mucus.     If your symptoms worsen, you develop chest pain or shortness of breath, fevers over 101, or are not improving in 5 days, please contact your primary care provider for an appointment or visit any of our convenient Walk-in Care or Urgent Care Centers to be seen which can be found on our website here.    Thanks again for choosing us as your health care partner,    Kaitlyn Zhao PA-C  "

## 2022-04-11 ENCOUNTER — E-VISIT (OUTPATIENT)
Dept: URGENT CARE | Facility: CLINIC | Age: 37
End: 2022-04-11
Payer: COMMERCIAL

## 2022-04-11 DIAGNOSIS — R05.9 COUGH: Primary | ICD-10-CM

## 2022-04-11 PROCEDURE — 99207 PR NON-BILLABLE SERV PER CHARTING: CPT | Performed by: FAMILY MEDICINE

## 2022-04-11 NOTE — PATIENT INSTRUCTIONS
Dear Kaitlyn Paul,    We are sorry you are not feeling well. Based on the responses you provided, it is recommended that you be seen in-person in urgent care so we can better evaluate your symptoms. Please click here to find the nearest urgent care location to you.   You will not be charged for this Visit. Thank you for trusting us with your care.    Juanita Walker MD

## 2022-05-16 ENCOUNTER — OFFICE VISIT (OUTPATIENT)
Dept: MIDWIFE SERVICES | Facility: CLINIC | Age: 37
End: 2022-05-16
Payer: COMMERCIAL

## 2022-05-16 VITALS
BODY MASS INDEX: 30.55 KG/M2 | WEIGHT: 212.9 LBS | DIASTOLIC BLOOD PRESSURE: 75 MMHG | HEART RATE: 67 BPM | SYSTOLIC BLOOD PRESSURE: 119 MMHG

## 2022-05-16 DIAGNOSIS — N96 HISTORY OF RECURRENT MISCARRIAGES, NOT CURRENTLY PREGNANT: ICD-10-CM

## 2022-05-16 DIAGNOSIS — Z30.8 ENCOUNTER FOR OTHER CONTRACEPTIVE MANAGEMENT: Primary | ICD-10-CM

## 2022-05-16 PROCEDURE — 99212 OFFICE O/P EST SF 10 MIN: CPT | Performed by: ADVANCED PRACTICE MIDWIFE

## 2022-05-23 NOTE — PROGRESS NOTES
Kaitlyn Paul is here for a discussion on fertility and risk of miscarriage.  Trying to decide if attempting a future pregnancy is putting her at any additional risk based on her age.    OBJECTIVE:    One  in 2016 at term.  6 Ab some with D and C.  # 1 - Date: None, Sex: None, Weight: None, GA: None, Delivery: None, Apgar1: None, Apgar5: None, Living: None, Birth Comments: None    # 2 - Date: None, Sex: None, Weight: None, GA: None, Delivery: None, Apgar1: None, Apgar5: None, Living: None, Birth Comments: None    # 3 - Date: None, Sex: None, Weight: None, GA: None, Delivery: None, Apgar1: None, Apgar5: None, Living: None, Birth Comments: None    # 4 - Date: None, Sex: None, Weight: None, GA: None, Delivery: None, Apgar1: None, Apgar5: None, Living: None, Birth Comments: None    # 5 - Date: , Sex: None, Weight: None, GA: None, Delivery: , Apgar1: None, Apgar5: None, Living: Living, Birth Comments: None    # 6 - Date: 2018, Sex: None, Weight: None, GA: None, Delivery: None, Apgar1: None, Apgar5: None, Living: None, Birth Comments: None    # 7 - Date: 10/29/21, Sex: None, Weight: None, GA: 9w0d, Delivery: None, Apgar1: None, Apgar5: None, Living: None, Birth Comments: None      Discussed that some risk of placental issues like accreta is increased due to D and C's but that would be a issue at delivery and potential loss of uterus for management.      All other risks are the same as any other 37 yo who attempts pregnancy.  Success of this pregnancy would be statistically less based on OB hx and age.    Decision for long term or permanent sterilization is a very individualized decision as is taking the emotional risk of pregnancy loss.    ASSESSMENT: Contraception vs fertility attempt discussion based on hx of SAB.    PLAN: Pt will consider and discuss with .  Would recommend procreation planning with OB or fertility specialist if decide to attempt pregnancy to maximize success if that is  possible.    Jordan Luis APRN, CNM

## 2022-07-29 ENCOUNTER — LAB (OUTPATIENT)
Dept: LAB | Facility: CLINIC | Age: 37
End: 2022-07-29
Payer: COMMERCIAL

## 2022-07-29 DIAGNOSIS — E89.0 POST-SURGICAL HYPOTHYROIDISM: ICD-10-CM

## 2022-07-29 DIAGNOSIS — C73 PAPILLARY THYROID CARCINOMA (H): ICD-10-CM

## 2022-07-29 LAB
T4 FREE SERPL-MCNC: 1.44 NG/DL (ref 0.76–1.46)
TSH SERPL DL<=0.005 MIU/L-ACNC: 0.4 MU/L (ref 0.4–4)

## 2022-07-29 PROCEDURE — 84432 ASSAY OF THYROGLOBULIN: CPT | Mod: 90 | Performed by: PATHOLOGY

## 2022-07-29 PROCEDURE — 84439 ASSAY OF FREE THYROXINE: CPT | Performed by: PATHOLOGY

## 2022-07-29 PROCEDURE — 84443 ASSAY THYROID STIM HORMONE: CPT | Performed by: PATHOLOGY

## 2022-07-29 PROCEDURE — 36415 COLL VENOUS BLD VENIPUNCTURE: CPT | Performed by: PATHOLOGY

## 2022-07-29 PROCEDURE — 99000 SPECIMEN HANDLING OFFICE-LAB: CPT | Performed by: PATHOLOGY

## 2022-07-29 PROCEDURE — 86800 THYROGLOBULIN ANTIBODY: CPT | Mod: 90 | Performed by: PATHOLOGY

## 2022-08-04 LAB — SCANNED LAB RESULT: NORMAL

## 2022-09-09 ENCOUNTER — MYC MEDICAL ADVICE (OUTPATIENT)
Dept: ENDOCRINOLOGY | Facility: CLINIC | Age: 37
End: 2022-09-09

## 2022-09-12 DIAGNOSIS — C73 PAPILLARY THYROID CARCINOMA (H): Primary | ICD-10-CM

## 2022-09-12 DIAGNOSIS — Z33.1 PREGNANCY, INCIDENTAL: ICD-10-CM

## 2022-09-12 DIAGNOSIS — E89.0 POST-SURGICAL HYPOTHYROIDISM: ICD-10-CM

## 2022-09-16 ENCOUNTER — LAB (OUTPATIENT)
Dept: LAB | Facility: CLINIC | Age: 37
End: 2022-09-16
Payer: COMMERCIAL

## 2022-09-16 DIAGNOSIS — Z33.1 PREGNANCY, INCIDENTAL: ICD-10-CM

## 2022-09-16 DIAGNOSIS — E89.0 POST-SURGICAL HYPOTHYROIDISM: ICD-10-CM

## 2022-09-16 DIAGNOSIS — C73 PAPILLARY THYROID CARCINOMA (H): ICD-10-CM

## 2022-09-16 LAB
T4 SERPL-MCNC: 9.9 UG/DL (ref 4.5–11.7)
TSH SERPL DL<=0.005 MIU/L-ACNC: 1.45 UIU/ML (ref 0.3–4.2)

## 2022-09-16 PROCEDURE — 84436 ASSAY OF TOTAL THYROXINE: CPT | Mod: 90 | Performed by: PATHOLOGY

## 2022-09-16 PROCEDURE — 36415 COLL VENOUS BLD VENIPUNCTURE: CPT | Performed by: PATHOLOGY

## 2022-09-16 PROCEDURE — 99000 SPECIMEN HANDLING OFFICE-LAB: CPT | Performed by: PATHOLOGY

## 2022-09-16 PROCEDURE — 84443 ASSAY THYROID STIM HORMONE: CPT | Mod: 90 | Performed by: PATHOLOGY

## 2022-09-22 ENCOUNTER — TELEPHONE (OUTPATIENT)
Dept: MIDWIFE SERVICES | Facility: CLINIC | Age: 37
End: 2022-09-22

## 2022-09-22 DIAGNOSIS — N96 HISTORY OF RECURRENT MISCARRIAGES, NOT CURRENTLY PREGNANT: Primary | ICD-10-CM

## 2022-09-22 DIAGNOSIS — N96 HISTORY OF RECURRENT MISCARRIAGES: ICD-10-CM

## 2022-09-22 NOTE — TELEPHONE ENCOUNTER
Orders signed.  Since she previously saw midwives, will forward to Jordan as FYI.    Mayda Santana MD

## 2022-09-22 NOTE — TELEPHONE ENCOUNTER
Kaitlyn is  @5w6d based on LMP of     Reports positive upt, wants early ultrasound due to ob history     Discussed serial HCG blood testing and planning ultrasound for 7 weeks or when HCG > 2500      Previous visit with Jordan Luis 2022 to discuss fertility and risk of miscarriage   PLAN: Pt will consider and discuss with .  Would recommend procreation planning with OB or fertility specialist if decide to attempt pregnancy to maximize success if that is possible.      Route to OB provider

## 2022-09-23 ENCOUNTER — LAB (OUTPATIENT)
Dept: LAB | Facility: CLINIC | Age: 37
End: 2022-09-23
Payer: COMMERCIAL

## 2022-09-23 DIAGNOSIS — N96 HISTORY OF RECURRENT MISCARRIAGES: ICD-10-CM

## 2022-09-23 PROCEDURE — 36415 COLL VENOUS BLD VENIPUNCTURE: CPT

## 2022-09-23 PROCEDURE — 84702 CHORIONIC GONADOTROPIN TEST: CPT

## 2022-09-25 DIAGNOSIS — N96 HISTORY OF RECURRENT MISCARRIAGES: Primary | ICD-10-CM

## 2022-09-25 LAB — HCG INTACT+B SERPL-ACNC: ABNORMAL MIU/ML

## 2022-10-03 ENCOUNTER — ANCILLARY PROCEDURE (OUTPATIENT)
Dept: ULTRASOUND IMAGING | Facility: CLINIC | Age: 37
End: 2022-10-03
Attending: ADVANCED PRACTICE MIDWIFE
Payer: COMMERCIAL

## 2022-10-03 DIAGNOSIS — N96 HISTORY OF RECURRENT MISCARRIAGES: ICD-10-CM

## 2022-10-03 PROCEDURE — 76817 TRANSVAGINAL US OBSTETRIC: CPT | Performed by: OBSTETRICS & GYNECOLOGY

## 2022-10-04 DIAGNOSIS — O20.0 THREATENED MISCARRIAGE IN EARLY PREGNANCY: Primary | ICD-10-CM

## 2022-10-17 ENCOUNTER — LAB (OUTPATIENT)
Dept: LAB | Facility: CLINIC | Age: 37
End: 2022-10-17
Payer: COMMERCIAL

## 2022-10-17 ENCOUNTER — OFFICE VISIT (OUTPATIENT)
Dept: MIDWIFE SERVICES | Facility: CLINIC | Age: 37
End: 2022-10-17
Attending: ADVANCED PRACTICE MIDWIFE
Payer: COMMERCIAL

## 2022-10-17 ENCOUNTER — ANCILLARY PROCEDURE (OUTPATIENT)
Dept: ULTRASOUND IMAGING | Facility: CLINIC | Age: 37
End: 2022-10-17
Attending: ADVANCED PRACTICE MIDWIFE
Payer: COMMERCIAL

## 2022-10-17 VITALS
WEIGHT: 216.5 LBS | DIASTOLIC BLOOD PRESSURE: 70 MMHG | OXYGEN SATURATION: 99 % | BODY MASS INDEX: 31.06 KG/M2 | SYSTOLIC BLOOD PRESSURE: 107 MMHG | HEART RATE: 71 BPM

## 2022-10-17 DIAGNOSIS — O02.1 MISSED AB: Primary | ICD-10-CM

## 2022-10-17 DIAGNOSIS — Z33.1 PREGNANCY, INCIDENTAL: ICD-10-CM

## 2022-10-17 DIAGNOSIS — O20.0 THREATENED MISCARRIAGE IN EARLY PREGNANCY: ICD-10-CM

## 2022-10-17 DIAGNOSIS — E89.0 POST-SURGICAL HYPOTHYROIDISM: ICD-10-CM

## 2022-10-17 DIAGNOSIS — C73 PAPILLARY THYROID CARCINOMA (H): ICD-10-CM

## 2022-10-17 LAB — T4 SERPL-MCNC: 9.5 UG/DL (ref 4.5–11.7)

## 2022-10-17 PROCEDURE — 36415 COLL VENOUS BLD VENIPUNCTURE: CPT

## 2022-10-17 PROCEDURE — 99213 OFFICE O/P EST LOW 20 MIN: CPT | Mod: 25 | Performed by: ADVANCED PRACTICE MIDWIFE

## 2022-10-17 PROCEDURE — 84443 ASSAY THYROID STIM HORMONE: CPT

## 2022-10-17 PROCEDURE — 76817 TRANSVAGINAL US OBSTETRIC: CPT | Performed by: OBSTETRICS & GYNECOLOGY

## 2022-10-17 PROCEDURE — 90715 TDAP VACCINE 7 YRS/> IM: CPT | Performed by: ADVANCED PRACTICE MIDWIFE

## 2022-10-17 PROCEDURE — 84436 ASSAY OF TOTAL THYROXINE: CPT

## 2022-10-17 PROCEDURE — 90471 IMMUNIZATION ADMIN: CPT | Performed by: ADVANCED PRACTICE MIDWIFE

## 2022-10-17 RX ORDER — MISOPROSTOL 200 UG/1
800 TABLET ORAL DAILY
Qty: 8 TABLET | Refills: 0 | Status: SHIPPED | OUTPATIENT
Start: 2022-10-17 | End: 2022-10-19

## 2022-10-17 NOTE — PATIENT INSTRUCTIONS
If bleeding is saturating a pad an hour for greater than 2 hours or uncontrolled pain with cramping, weakness, dizziness, fever and sweating go to the emergency room.

## 2022-10-17 NOTE — PROGRESS NOTES
S: Celeste is a 36yo , here after viability U/S that confirms nonviable pregnancy. She has experience with options, and prefers medical management. Her  is scheduled for a vasectomy consult in Dec., not interested in another pregnancy.    O:/70   Pulse 71   Wt 98.2 kg (216 lb 8 oz)   SpO2 99%   BMI 31.06 kg/m    Exam:  Constitutional: healthy, alert and no distress  Psychiatric: mentation appears normal and affect flat    A/P:  (O02.1) Missed ab  (primary encounter diagnosis)  Plan: misoprostol (CYTOTEC) 200 MCG tablet    Options discussed:     1. Expectant management:  - Waiting for miscarriage to start  - Heavy bleeding and cramping until pregnancy sac passes, then bleeding and cramping tapers off  - Unknown when it will start  -.Can take Ibuprofen 800 mg (4 of the over the counter tablets) every 8 hours for cramping  - If no progression in 2 weeks, make a clinic appointment for options     2. Misoprostol and Mifepristone:   - Medication to start miscarriage  - Misoprostol medication (placed in the vagina), repeat in 24 hours if not effective  - Ultrasound follow up in 7 days after taking the medication  - If not successful will need a D&C (about 80% success rate)      3. D&C:  - Outpatient surgery to complete the miscarriage  - Pre-surgery clinic visit with physicians   - Home the same day  - Short procedure with IV sedation  - Able to do chromosome testing if desired       The patient would like to use misoprostol. Discussed instructions: 800mcg vaginally or buccally all at once. If no or minimal symptoms after 24 hours, refill and repeat. Discussed anticipated course. Discussed risks including heavy bleeding or insufficient effect. Discussed likelihood of success and possible need for D&C.  Provided with Norco #6. Patient will contact me with results and we will schedule a follow up ultrasound to confirm complete expulsion. She was asked to call the clinic for any questions or concerns  including bleeding heavier than anticipated or fever.     Celeste prefers to do cytotec x2 doses, as that is what she has used in the past. Discussed mifepristone is an option, but she would prefer to have more control, and hoping things progress as she has experienced in the past.  Bleeding precautions discussed.  Offered pain medication, states she usually only needs ibu. Knows to contact oncall provider if she needs something else for pain, has strange symptoms, too much bleeding, or symptoms of excessive blood loss.     Andrey Cedillo CNM

## 2022-10-18 LAB — TSH SERPL DL<=0.005 MIU/L-ACNC: 0.66 MU/L (ref 0.4–4)

## 2022-11-01 DIAGNOSIS — C73 PAPILLARY THYROID CARCINOMA (H): ICD-10-CM

## 2022-11-01 DIAGNOSIS — E89.0 POST-SURGICAL HYPOTHYROIDISM: ICD-10-CM

## 2022-11-01 RX ORDER — LEVOTHYROXINE SODIUM 175 UG/1
TABLET ORAL
Qty: 96 TABLET | Refills: 4 | Status: SHIPPED | OUTPATIENT
Start: 2022-11-01 | End: 2023-03-15 | Stop reason: DRUGHIGH

## 2022-11-01 NOTE — TELEPHONE ENCOUNTER
levothyroxine (SYNTHROID/LEVOTHROID) 175 MCG tablet 96 tablet 4 11/3/2021         Last Written Prescription Date:  11-3-2022  Last Fill Quantity: 96,   # refills: 4  Last Office Visit : 10-  Future Office visit:  3-    Routing refill request to provider for review/approval because:  Provider preference.         Kathleen M Doege RN

## 2022-11-21 ENCOUNTER — TELEPHONE (OUTPATIENT)
Dept: MIDWIFE SERVICES | Facility: CLINIC | Age: 37
End: 2022-11-21

## 2022-11-21 NOTE — TELEPHONE ENCOUNTER
Reason for call:  Order   Order or referral being requested: Ultrasound  Reason for request: SAB follow up  Date needed: as soon as possible  Has the patient been seen by the PCP for this problem? Not Applicable    Additional comments: Pt having pain after SAB. Had originally refused additional imaging, but would like it done now as she is in pain    Phone number to reach patient:  Home number on file [phone] (home)    Best Time:  n/a    Can we leave a detailed message on this number?  YES    Travel screening: Not Applicable

## 2022-11-29 ENCOUNTER — ANCILLARY PROCEDURE (OUTPATIENT)
Dept: GENERAL RADIOLOGY | Facility: CLINIC | Age: 37
End: 2022-11-29
Attending: PODIATRIST
Payer: COMMERCIAL

## 2022-11-29 ENCOUNTER — OFFICE VISIT (OUTPATIENT)
Dept: PODIATRY | Facility: CLINIC | Age: 37
End: 2022-11-29
Payer: COMMERCIAL

## 2022-11-29 VITALS
HEIGHT: 70 IN | DIASTOLIC BLOOD PRESSURE: 68 MMHG | SYSTOLIC BLOOD PRESSURE: 108 MMHG | WEIGHT: 216 LBS | BODY MASS INDEX: 30.92 KG/M2

## 2022-11-29 DIAGNOSIS — S93.412A SPRAIN OF CALCANEOFIBULAR LIGAMENT OF LEFT ANKLE, INITIAL ENCOUNTER: Primary | ICD-10-CM

## 2022-11-29 DIAGNOSIS — M25.572 ACUTE LEFT ANKLE PAIN: ICD-10-CM

## 2022-11-29 DIAGNOSIS — S92.502S: ICD-10-CM

## 2022-11-29 DIAGNOSIS — M76.72 PERONEAL TENDONITIS, LEFT: ICD-10-CM

## 2022-11-29 DIAGNOSIS — M25.372 ANKLE INSTABILITY, LEFT: ICD-10-CM

## 2022-11-29 PROCEDURE — 73610 X-RAY EXAM OF ANKLE: CPT | Mod: TC | Performed by: RADIOLOGY

## 2022-11-29 PROCEDURE — 99204 OFFICE O/P NEW MOD 45 MIN: CPT | Performed by: PODIATRIST

## 2022-11-29 NOTE — LETTER
"    11/29/2022         RE: Kaitlyn Paul  [address] [facility] [address]        Dear Colleague,    Thank you for referring your patient, Kaitlyn Paul, to the [facility]. Please see a copy of my visit note below.    Assessment:      ICD-10-CM    1. Sprain of calcaneofibular ligament of left ankle, initial encounter  S93.412A XR Ankle Left G/E 3 Views     Physical Therapy Referral      2. Ankle instability, left  M25.372 Physical Therapy Referral      3. Closed fracture of fifth toe of left foot, sequela  S92.502S Physical Therapy Referral      4. Peroneal tendonitis, left  M76.72           Plan:  Orders Placed This Encounter   Procedures     XR Ankle Left G/E 3 Views     Physical Therapy Referral       Discussed the etiology and treatment of the condition with the patient.  Imaging studies reviewed and discussed with the patient.  Discussed surgical and conservative options.    Acute on chronic lateral ankle instability  Significant CFL injury / talar tilt    -Boot or brace.  Pt has brace  Cinch into eversion to protect lateral ligaments  -PT referral - peroneal strengthening, proprioception  -NSAID- po offered, pt declines    -MRI ankle if not improved  -Repeat foot x-rays if 5th digit from piror Fx not improved       Return:  No follow-ups on file.    Juanita Narayan DPM                Chief Complaint:     Patient presents with:  Foot Problems     left ankle injury.    HPI:  Kaitlyn Paul is a 37 year old year old female who presents for evaluation of ankle injury.    Date of injury- 1 month ago  Mechanism of injury- inversion.  Occurred while walking on grass  Pain location- lateral ankle    Pt states she has sprained both ankles in  Sports in the past.  She remembers these sprains had significant bruising / swelling.  This time she heard a \"pop\" and had focal bruising over the lateral ankle but much less swelling.  Has been WB in " shoes  Feels like the ankle is unsteady with inversion      Past Medical & Surgical History:  Past Medical History:   Diagnosis Date     Arthritis      Dry eye      Encounter for IUD insertion 12/11/2020    Mirena inserted by Dr Barnett     Encounter for IUD removal 2018 2011-2015 and then again [id number]     History of recurrent miscarriages     x 4     Papillary thyroid carcinoma (H) 02/2014     Post-surgical hypothyroidism 02/2014      Past Surgical History:   Procedure Laterality Date     central neck dissection  02/04/2014     D & C      8/2018, 7/2019, 8/2020     HC KNEE SCOPE,MED/LAT MENISECTOMY  2009     HC REMOVAL GALLBLADDER  2005    lap      near total thyroidectomy  02/04/2014     ZZC NONSPECIFIC PROCEDURE  2007    lipoma excision from her back     ZZC REPAIR CRUCIATE LIGAMENT,KNEE  1999    left      Family History   Problem Relation Age of Onset     Graves' disease Mother      Thyroid Disease Mother      Diabetes Father      Cerebrovascular Disease Maternal Grandmother      Cardiovascular Maternal Grandmother      Colon Cancer Maternal Grandmother      Cardiovascular Maternal Grandfather      Gastrointestinal Disease Paternal Grandmother         celiac     Cardiovascular Paternal Grandfather      No Known Problems Sister      No Known Problems Brother      No Known Problems Other      Thyroid Cancer No family hx of         Social History:  ?  History   Smoking Status     Never   Smokeless Tobacco     Never     History   Drug Use No     Social History    Substance and Sexual Activity      Alcohol use: No      Allergies:  ?   Allergies   Allergen Reactions     Sulfa Drugs      Rash as a child        Medications:    Current Outpatient Medications   Medication     benzonatate (TESSALON) 100 MG capsule     clobetasol (TEMOVATE) 0.05 % external ointment     guaiFENesin 1200 MG TB12     levothyroxine (SYNTHROID/LEVOTHROID) 175 MCG tablet     Loratadine (CLARITIN PO)     multivitamin w/minerals (THERA-VIT-M)  "tablet     Omega-3 1000 MG capsule     Current Facility-Administered Medications   Medication     lidocaine (PF) 0.5 % injection SOLN 8 mL     triamcinolone (KENALOG-40) injection 40 mg       Physical Exam:  ?  Vitals:  /68   Ht 1.778 m (5' 10\")   Wt 98 kg (216 lb)   Breastfeeding No   BMI 30.99 kg/m     General:  WD/WN, in NAD.  A&O x3.  Dermatologic:    Skin is intact, open lesions absent.   Bruising present diffusing lateral RF / ankle.  Skin texture, turgor is normal.  Vascular:  Pulses palpable left.  Digital capillary refill time normal left.  Skin temperature is warm left.  Generalized edema- none bilateral..  Focal edema- mild lateral ankle left..  Neurologic:    Gross sensation normal.  Gait and balance abnormal, antalgic, L.  Musculoskeletal:  Maximal pain to palpation of CFL, peroneals subfibular region, left.  mild pain to palpation of ATFL over lateral ankle gutter left.  Ankle ROM full, pain free left.  Anterior drawer unstable left vs R  Talar tilt significantly unstable left vs R and guards.  Tib / Fib squeeze test pain free left.  Manual Muscle Testing of PB  painful  4/5, PL painful 5/5  left.    Patient is able to bear weight on the injured extremity.  Patient is able to walk on the injured extremity.      Imaging:  x-ray independently reviewed and interpreted by myself today.  Weight-bearing views left ankle dated 11/29/22, reveal avulsion fx off fibula in area of CFL insertion to lateral gutter.  Os trigonum to posterior ankle w/ mild displacement.  Ankle subchondral bone intact.  No malleolar Fx.  Mortise intact without widening or degeneration.            Again, thank you for allowing me to participate in the care of your patient.        Sincerely,        Juanita Narayan DPM    "

## 2022-11-29 NOTE — PROGRESS NOTES
"Assessment:      ICD-10-CM    1. Sprain of calcaneofibular ligament of left ankle, initial encounter  S93.412A XR Ankle Left G/E 3 Views     Physical Therapy Referral      2. Ankle instability, left  M25.372 Physical Therapy Referral      3. Closed fracture of fifth toe of left foot, sequela  S92.502S Physical Therapy Referral      4. Peroneal tendonitis, left  M76.72           Plan:  Orders Placed This Encounter   Procedures     XR Ankle Left G/E 3 Views     Physical Therapy Referral       Discussed the etiology and treatment of the condition with the patient.  Imaging studies reviewed and discussed with the patient.  Discussed surgical and conservative options.    Acute on chronic lateral ankle instability  Significant CFL injury / talar tilt    -Boot or brace.  Pt has brace  Cinch into eversion to protect lateral ligaments  -PT referral - peroneal strengthening, proprioception  -NSAID- po offered, pt declines    -MRI ankle if not improved  -Repeat foot x-rays if 5th digit from piror Fx not improved       Return:  No follow-ups on file.    Juanita Narayan DPM                Chief Complaint:     Patient presents with:  Foot Problems     left ankle injury.    HPI:  Kaitlyn Paul is a 37 year old year old female who presents for evaluation of ankle injury.    Date of injury- 1 month ago  Mechanism of injury- inversion.  Occurred while walking on grass  Pain location- lateral ankle    Pt states she has sprained both ankles in  Sports in the past.  She remembers these sprains had significant bruising / swelling.  This time she heard a \"pop\" and had focal bruising over the lateral ankle but much less swelling.  Has been WB in shoes  Feels like the ankle is unsteady with inversion      Past Medical & Surgical History:  Past Medical History:   Diagnosis Date     Arthritis      Dry eye      Encounter for IUD insertion 12/11/2020    Mirena inserted by Dr Barnett     Encounter for IUD removal 2018 2011-2015 " "and then again [id number]     History of recurrent miscarriages     x 4     Papillary thyroid carcinoma (H) 02/2014     Post-surgical hypothyroidism 02/2014      Past Surgical History:   Procedure Laterality Date     central neck dissection  02/04/2014     D & C      8/2018, 7/2019, 8/2020     HC KNEE SCOPE,MED/LAT MENISECTOMY  2009     HC REMOVAL GALLBLADDER  2005    lap      near total thyroidectomy  02/04/2014     Z NONSPECIFIC PROCEDURE  2007    lipoma excision from her back     Z REPAIR CRUCIATE LIGAMENT,KNEE  1999    left      Family History   Problem Relation Age of Onset     Graves' disease Mother      Thyroid Disease Mother      Diabetes Father      Cerebrovascular Disease Maternal Grandmother      Cardiovascular Maternal Grandmother      Colon Cancer Maternal Grandmother      Cardiovascular Maternal Grandfather      Gastrointestinal Disease Paternal Grandmother         celiac     Cardiovascular Paternal Grandfather      No Known Problems Sister      No Known Problems Brother      No Known Problems Other      Thyroid Cancer No family hx of         Social History:  ?  History   Smoking Status     Never   Smokeless Tobacco     Never     History   Drug Use No     Social History    Substance and Sexual Activity      Alcohol use: No      Allergies:  ?   Allergies   Allergen Reactions     Sulfa Drugs      Rash as a child        Medications:    Current Outpatient Medications   Medication     benzonatate (TESSALON) 100 MG capsule     clobetasol (TEMOVATE) 0.05 % external ointment     guaiFENesin 1200 MG TB12     levothyroxine (SYNTHROID/LEVOTHROID) 175 MCG tablet     Loratadine (CLARITIN PO)     multivitamin w/minerals (THERA-VIT-M) tablet     Omega-3 1000 MG capsule     Current Facility-Administered Medications   Medication     lidocaine (PF) 0.5 % injection SOLN 8 mL     triamcinolone (KENALOG-40) injection 40 mg       Physical Exam:  ?  Vitals:  /68   Ht 1.778 m (5' 10\")   Wt 98 kg (216 lb)   " Breastfeeding No   BMI 30.99 kg/m     General:  WD/WN, in NAD.  A&O x3.  Dermatologic:    Skin is intact, open lesions absent.   Bruising present diffusing lateral RF / ankle.  Skin texture, turgor is normal.  Vascular:  Pulses palpable left.  Digital capillary refill time normal left.  Skin temperature is warm left.  Generalized edema- none bilateral..  Focal edema- mild lateral ankle left..  Neurologic:    Gross sensation normal.  Gait and balance abnormal, antalgic, L.  Musculoskeletal:  Maximal pain to palpation of CFL, peroneals subfibular region, left.  mild pain to palpation of ATFL over lateral ankle gutter left.  Ankle ROM full, pain free left.  Anterior drawer unstable left vs R  Talar tilt significantly unstable left vs R and guards.  Tib / Fib squeeze test pain free left.  Manual Muscle Testing of PB  painful  4/5, PL painful 5/5  left.    Patient is able to bear weight on the injured extremity.  Patient is able to walk on the injured extremity.      Imaging:  x-ray independently reviewed and interpreted by myself today.  Weight-bearing views left ankle dated 11/29/22, reveal avulsion fx off fibula in area of CFL insertion to lateral gutter.  Os trigonum to posterior ankle w/ mild displacement.  Ankle subchondral bone intact.  No malleolar Fx.  Mortise intact without widening or degeneration.

## 2022-12-16 ENCOUNTER — LAB (OUTPATIENT)
Dept: LAB | Facility: CLINIC | Age: 37
End: 2022-12-16
Payer: COMMERCIAL

## 2022-12-16 DIAGNOSIS — N89.8 VAGINAL DISCHARGE: Primary | ICD-10-CM

## 2022-12-16 DIAGNOSIS — N89.8 VAGINAL DISCHARGE: ICD-10-CM

## 2022-12-16 DIAGNOSIS — N93.9 ABNORMAL UTERINE BLEEDING (AUB): Primary | ICD-10-CM

## 2022-12-16 LAB — HCG INTACT+B SERPL-ACNC: <1 MIU/ML

## 2022-12-16 PROCEDURE — 36415 COLL VENOUS BLD VENIPUNCTURE: CPT

## 2022-12-16 PROCEDURE — 84702 CHORIONIC GONADOTROPIN TEST: CPT

## 2023-01-20 DIAGNOSIS — M25.562 BILATERAL CHRONIC KNEE PAIN: Primary | ICD-10-CM

## 2023-01-20 DIAGNOSIS — M25.561 BILATERAL CHRONIC KNEE PAIN: Primary | ICD-10-CM

## 2023-01-20 DIAGNOSIS — G89.29 BILATERAL CHRONIC KNEE PAIN: Primary | ICD-10-CM

## 2023-01-23 ENCOUNTER — OFFICE VISIT (OUTPATIENT)
Dept: ORTHOPEDICS | Facility: CLINIC | Age: 38
End: 2023-01-23
Payer: COMMERCIAL

## 2023-01-23 ENCOUNTER — ANCILLARY PROCEDURE (OUTPATIENT)
Dept: GENERAL RADIOLOGY | Facility: CLINIC | Age: 38
End: 2023-01-23
Attending: ORTHOPAEDIC SURGERY
Payer: COMMERCIAL

## 2023-01-23 VITALS — BODY MASS INDEX: 30.92 KG/M2 | HEIGHT: 70 IN | WEIGHT: 216 LBS

## 2023-01-23 DIAGNOSIS — G89.29 CHRONIC PAIN OF RIGHT KNEE: Primary | ICD-10-CM

## 2023-01-23 DIAGNOSIS — G89.29 BILATERAL CHRONIC KNEE PAIN: ICD-10-CM

## 2023-01-23 DIAGNOSIS — M25.561 BILATERAL CHRONIC KNEE PAIN: ICD-10-CM

## 2023-01-23 DIAGNOSIS — M25.562 BILATERAL CHRONIC KNEE PAIN: ICD-10-CM

## 2023-01-23 DIAGNOSIS — M25.561 CHRONIC PAIN OF RIGHT KNEE: Primary | ICD-10-CM

## 2023-01-23 PROCEDURE — 20610 DRAIN/INJ JOINT/BURSA W/O US: CPT | Mod: RT | Performed by: ORTHOPAEDIC SURGERY

## 2023-01-23 PROCEDURE — 73562 X-RAY EXAM OF KNEE 3: CPT | Mod: GC | Performed by: RADIOLOGY

## 2023-01-23 PROCEDURE — 99214 OFFICE O/P EST MOD 30 MIN: CPT | Mod: 25 | Performed by: ORTHOPAEDIC SURGERY

## 2023-01-23 RX ORDER — TRIAMCINOLONE ACETONIDE 40 MG/ML
40 INJECTION, SUSPENSION INTRA-ARTICULAR; INTRAMUSCULAR
Status: DISCONTINUED | OUTPATIENT
Start: 2023-01-23 | End: 2023-05-09

## 2023-01-23 RX ORDER — LIDOCAINE HYDROCHLORIDE 5 MG/ML
8 INJECTION, SOLUTION INFILTRATION; INTRAVENOUS
Status: DISCONTINUED | OUTPATIENT
Start: 2023-01-23 | End: 2023-05-09

## 2023-01-23 RX ADMIN — LIDOCAINE HYDROCHLORIDE 8 ML: 5 INJECTION, SOLUTION INFILTRATION; INTRAVENOUS at 09:11

## 2023-01-23 RX ADMIN — TRIAMCINOLONE ACETONIDE 40 MG: 40 INJECTION, SUSPENSION INTRA-ARTICULAR; INTRAMUSCULAR at 09:11

## 2023-01-23 NOTE — LETTER
1/23/2023         RE: Kaitlyn Paul  [address] [facility] [address]        Dear Colleague,    Thank you for referring your patient, Kaitlyn Paul, to the [facility] [place]. Please see a copy of my visit note below.    Celeste returns to my clinic today for interval follow-up I saw her in December 2021.At that time I felt that she is 7 years status post a right knee ACL reconstruction with intact graft and a stable knee though she was having intermittent swelling for the past few weeks.  She is also status post left knee ACL reconstruction in the late 90s.  She had 2 subsequent chondroplasties and felt that she was doing well.  I performed a right knee corticosteroid injection.  This was helpful to the patient.  She saw good relief from it.  It lasted longer than she expected.  It is now worn off and she like a repeat injection.  I told her at our last visit that I did not have great surgical objectives.    Examination of her right knee today shows range of motion 0 to 135 degrees.  Stable to varus valgus stress testing.  Stable anterior posterior drawer testing.  No pivot shift.  Lachman 0 with just trace increased translation.  Firm endpoint.  Left knee today shows good preservation of her motion.  Ligaments stable.  Neurovascularly intact.    Imaging reviewed today shows intact ligaments.  Intact ACL, early chondrosis is present    Clinical assessment: Right knee ACL reconstruction 7 years ago intact graft, presumed posttraumatic arthritis of the right knee    Plan: Long discussion with the patient.  This time and delighted that she got such a good result from the steroid injection a year ago.  Nothing can repeat this.    I discussed with her the pros cons risk and benefits.  She like to proceed.  We will complete this today.    After written informed consent obtained and signed, after sufficient prepping and sterile technique, 40 mg of kenalog and , 8 cc of  1% lidocaine were injected without complication into the right knee. The patient tolerated the injection well and a sterile dressing was applied.     I also told her that that at this time I do not have great surgical objectives for her knee and so I think that it is fine to continue to treat this nonsurgically and maximize her nonsurgical intervention.    Large Joint Injection: R knee joint    Date/Time: 1/23/2023 9:11 AM  Performed by: Tye Pena MD  Authorized by: Tye Pena MD     Indications:  Pain and osteoarthritis  Needle Size:  22 G  Guidance: landmark guided    Approach:  Anterolateral  Location:  Knee      Medications:  40 mg triamcinolone 40 MG/ML; 8 mL lidocaine (PF) 0.5 %  Outcome:  Tolerated well, no immediate complications  Procedure discussed: discussed risks, benefits, and alternatives    Consent Given by:  Patient  Timeout: timeout called immediately prior to procedure    Prep: patient was prepped and draped in usual sterile fashion              Again, thank you for allowing me to participate in the care of your patient.        Sincerely,        Tye Pena MD

## 2023-01-23 NOTE — NURSING NOTE
[address] [address]  Dept: [phone]  ______________________________________________________________________________    Patient: Kaitlyn Paul   : 1985   MRN: [id number]   2023    INVASIVE PROCEDURE SAFETY CHECKLIST    Date: 2023   Procedure: Right knee joint injection with kenalog  Patient Name: Kaitlyn Paul  MRN: [id number]  YOB: 1985    Action: Complete sections as appropriate. Any discrepancy results in a HARD COPY until resolved.     PRE PROCEDURE:  Patient ID verified with 2 identifiers (name and  or MRN): Yes  Procedure and site verified with patient/designee (when able): Yes  Accurate consent documentation in medical record: Yes  H&P (or appropriate assessment) documented in medical record: Yes  H&P must be up to 20 days prior to procedure and updates within 24 hours of procedure as applicable: NA  Relevant diagnostic and radiology test results appropriately labeled and displayed as applicable: NA  Procedure site(s) marked with provider initials: NA    TIMEOUT:  Time-Out performed immediately prior to starting procedure, including verbal and active participation of all team members addressing the following:Yes  * Correct patient identify  * Confirmed that the correct side and site are marked  * An accurate procedure consent form  * Agreement on the procedure to be done  * Correct patient position  * Relevant images and results are properly labeled and appropriately displayed  * The need to administer antibiotics or fluids for irrigation purposes during the procedure as applicable   * Safety precautions based on patient history or medication use    DURING PROCEDURE: Verification of correct person, site, and procedures any time the responsibility for care of the patient is transferred to another member of the care team.       Prior to injection, verified patient identity using  patient's name and date of birth.  Due to injection administration, patient instructed to remain in clinic for 15 minutes  afterwards, and to report any adverse reaction to me immediately.    Joint injection was performed.      Lido  Drug Amount Wasted:  Yes: 42 mg/ml   Vial/Syringe: Single dose vial  Expiration Date:  07/01/2026    Kenalog  Drug Amount Wasted: No  Vial/Syringe: Single dose vial  Expiration Date:  08/01/2024    Elida Ramirez, ATC  January 23, 2023

## 2023-01-23 NOTE — PROGRESS NOTES
Celeste returns to my clinic today for interval follow-up I saw her in December 2021.At that time I felt that she is 7 years status post a right knee ACL reconstruction with intact graft and a stable knee though she was having intermittent swelling for the past few weeks.  She is also status post left knee ACL reconstruction in the late 90s.  She had 2 subsequent chondroplasties and felt that she was doing well.  I performed a right knee corticosteroid injection.  This was helpful to the patient.  She saw good relief from it.  It lasted longer than she expected.  It is now worn off and she like a repeat injection.  I told her at our last visit that I did not have great surgical objectives.    Examination of her right knee today shows range of motion 0 to 135 degrees.  Stable to varus valgus stress testing.  Stable anterior posterior drawer testing.  No pivot shift.  Lachman 0 with just trace increased translation.  Firm endpoint.  Left knee today shows good preservation of her motion.  Ligaments stable.  Neurovascularly intact.    Imaging reviewed today shows intact ligaments.  Intact ACL, early chondrosis is present    Clinical assessment: Right knee ACL reconstruction 7 years ago intact graft, presumed posttraumatic arthritis of the right knee    Plan: Long discussion with the patient.  This time and delighted that she got such a good result from the steroid injection a year ago.  Nothing can repeat this.    I discussed with her the pros cons risk and benefits.  She like to proceed.  We will complete this today.    After written informed consent obtained and signed, after sufficient prepping and sterile technique, 40 mg of kenalog and , 8 cc of 1% lidocaine were injected without complication into the right knee. The patient tolerated the injection well and a sterile dressing was applied.     I also told her that that at this time I do not have great surgical objectives for her knee and so I think that it is fine to  continue to treat this nonsurgically and maximize her nonsurgical intervention.    Large Joint Injection: R knee joint    Date/Time: 1/23/2023 9:11 AM  Performed by: Tye Pena MD  Authorized by: Tye Pena MD     Indications:  Pain and osteoarthritis  Needle Size:  22 G  Guidance: landmark guided    Approach:  Anterolateral  Location:  Knee      Medications:  40 mg triamcinolone 40 MG/ML; 8 mL lidocaine (PF) 0.5 %  Outcome:  Tolerated well, no immediate complications  Procedure discussed: discussed risks, benefits, and alternatives    Consent Given by:  Patient  Timeout: timeout called immediately prior to procedure    Prep: patient was prepped and draped in usual sterile fashion

## 2023-01-23 NOTE — NURSING NOTE
"Reason For Visit:   Chief Complaint   Patient presents with     RECHECK     Bilateral knees     ?  No    Date of injury: None   Type of injury: None    Patient was last seen on 12/13/2021 where she received a right knee CSI. Patient reports that her symptoms (pain and swelling) were decreased for > 1 year. She reports that her right knee is more painful than her left. She has continued to restrict high impact activity. She is interested in repeating her right knee injection and possibly the left knee. Other treatments include ibuprofen prn, elevation, apply ice pack and ability to work from home.     SANE Score  Left Knee: 80%  Right Knee: 80%    Ht 1.778 m (5' 10\")   Wt 98 kg (216 lb)   BMI 30.99 kg/m           Allergies   Allergen Reactions     Sulfa Drugs      Rash as a child       Current Outpatient Medications   Medication     clobetasol (TEMOVATE) 0.05 % external ointment     levothyroxine (SYNTHROID/LEVOTHROID) 175 MCG tablet     multivitamin w/minerals (THERA-VIT-M) tablet     Omega-3 1000 MG capsule     Current Facility-Administered Medications   Medication     lidocaine (PF) 0.5 % injection SOLN 8 mL     triamcinolone (KENALOG-40) injection 40 mg         Elida Ramirez, ATC    "

## 2023-02-09 ENCOUNTER — VIRTUAL VISIT (OUTPATIENT)
Dept: FAMILY MEDICINE | Facility: CLINIC | Age: 38
End: 2023-02-09
Payer: COMMERCIAL

## 2023-02-09 DIAGNOSIS — A08.4 VIRAL GASTROENTERITIS: ICD-10-CM

## 2023-02-09 DIAGNOSIS — R11.0 NAUSEA: Primary | ICD-10-CM

## 2023-02-09 PROCEDURE — 99213 OFFICE O/P EST LOW 20 MIN: CPT | Mod: VID | Performed by: PHYSICIAN ASSISTANT

## 2023-02-09 RX ORDER — ONDANSETRON 4 MG/1
4-8 TABLET, ORALLY DISINTEGRATING ORAL EVERY 8 HOURS PRN
Qty: 20 TABLET | Refills: 0 | Status: SHIPPED | OUTPATIENT
Start: 2023-02-09 | End: 2023-03-15

## 2023-02-09 NOTE — PROGRESS NOTES
"Celeste is a 37 year old who is being evaluated via a billable video visit.      How would you like to obtain your AVS? MyChart  If the video visit is dropped, the invitation should be resent by: Text to cell phone: [phone]  Will anyone else be joining your video visit? No          Assessment & Plan       ICD-10-CM    1. Nausea  R11.0 ondansetron (ZOFRAN ODT) 4 MG ODT tab      2. Viral gastroenteritis  A08.4           1,2) Her symptoms are most consistent with a viral gastritis or gastroenteritis. Zofran PRN nausea. Supportive therapy also discussed. Follow up if symptoms fail to improve or worsen.     She has an appointment with a PCP next month and will request to have a CBC and iron levels done.      Prescription drug management         BMI:   Estimated body mass index is 30.99 kg/m  as calculated from the following:    Height as of 1/23/23: 1.778 m (5' 10\").    Weight as of 1/23/23: 98 kg (216 lb).       Return in about 1 week (around 2/16/2023), or if symptoms worsen or fail to improve.    Yolanda Downey PA-C  [facility] BERTA Alonso is a 37 year old, presenting for the following health issues:  Illness      History of Present Illness       Reason for visit:  Feeling weak, sick, tired for several days  Symptom onset:  1-3 days ago  Symptoms include:  Diarreha, fatigue, weakness, nausea  Symptom intensity:  Moderate  Symptom progression:  Worsening  Had these symptoms before:  No  What makes it worse:  No  What makes it better:  No    She eats 2-3 servings of fruits and vegetables daily.She consumes 0 sweetened beverage(s) daily.She exercises with enough effort to increase her heart rate 30 to 60 minutes per day.  She exercises with enough effort to increase her heart rate 4 days per week.   She is taking medications regularly.       Acute Illness  Acute illness concerns: fatigue, diarrhea, nausea  Onset/Duration: 3 days   Symptoms:  Fever: No  Chills/Sweats: YES- " chills  Headache (location?): No  Sinus Pressure: No  Conjunctivitis:  No  Ear Pain: no  Rhinorrhea: No  Congestion: No  Sore Throat: No  Cough: no  Wheeze: No  Decreased Appetite: YES  Nausea: YES  Vomiting: No  Diarrhea: YES  Dysuria/Freq.: No  Dysuria or Hematuria: No  Fatigue/Achiness: YES - decreased energy  Sick/Strep Exposure: No  Therapies tried and outcome: pepto bismol    Only had diarrhea on Monday. Was also exhausted  Tuesday was exhausted, napped x4 hours, felt weak  Felt fine yesterday, worked out and felt like she was going to pass out at the end of it, nauseated last night  Feels ok today, mild nausea  Feels like when she was previously anemic      Review of Systems   Constitutional, HEENT, pulmonary, gi and gu systems are negative, except as otherwise noted.      Objective           Vitals:  No vitals were obtained today due to virtual visit.    Physical Exam   GENERAL: Healthy, alert and no distress  EYES: Eyes grossly normal to inspection.  No discharge or erythema, or obvious scleral/conjunctival abnormalities.  RESP: No audible wheeze, cough, or visible cyanosis.  No visible retractions or increased work of breathing.    SKIN: Visible skin clear. No significant rash, abnormal pigmentation or lesions.  NEURO: Cranial nerves grossly intact.  Mentation and speech appropriate for age.  PSYCH: Mentation appears normal, affect normal/bright, judgement and insight intact, normal speech and appearance well-groomed.          Video-Visit Details    Type of service:  Video Visit     Originating Location (pt. Location): Home    Distant Location (provider location):  Off-site  Platform used for Video Visit: [employer]

## 2023-03-09 ENCOUNTER — LAB (OUTPATIENT)
Dept: LAB | Facility: CLINIC | Age: 38
End: 2023-03-09
Payer: COMMERCIAL

## 2023-03-09 DIAGNOSIS — C73 PAPILLARY THYROID CARCINOMA (H): ICD-10-CM

## 2023-03-09 DIAGNOSIS — E89.0 POST-SURGICAL HYPOTHYROIDISM: ICD-10-CM

## 2023-03-09 DIAGNOSIS — Z33.1 PREGNANCY, INCIDENTAL: ICD-10-CM

## 2023-03-09 LAB
T4 SERPL-MCNC: 10.3 UG/DL (ref 4.5–11.7)
TSH SERPL DL<=0.005 MIU/L-ACNC: 1.26 UIU/ML (ref 0.3–4.2)

## 2023-03-09 PROCEDURE — 84443 ASSAY THYROID STIM HORMONE: CPT | Performed by: PATHOLOGY

## 2023-03-09 PROCEDURE — 99000 SPECIMEN HANDLING OFFICE-LAB: CPT | Performed by: PATHOLOGY

## 2023-03-09 PROCEDURE — 36415 COLL VENOUS BLD VENIPUNCTURE: CPT | Performed by: PATHOLOGY

## 2023-03-09 PROCEDURE — 84436 ASSAY OF TOTAL THYROXINE: CPT | Mod: 90 | Performed by: PATHOLOGY

## 2023-03-15 ENCOUNTER — OFFICE VISIT (OUTPATIENT)
Dept: ENDOCRINOLOGY | Facility: CLINIC | Age: 38
End: 2023-03-15
Payer: COMMERCIAL

## 2023-03-15 VITALS
WEIGHT: 221.7 LBS | SYSTOLIC BLOOD PRESSURE: 129 MMHG | HEART RATE: 64 BPM | OXYGEN SATURATION: 100 % | DIASTOLIC BLOOD PRESSURE: 81 MMHG | BODY MASS INDEX: 31.81 KG/M2

## 2023-03-15 DIAGNOSIS — E89.0 POST-SURGICAL HYPOTHYROIDISM: ICD-10-CM

## 2023-03-15 DIAGNOSIS — C73 PAPILLARY THYROID CARCINOMA (H): Primary | ICD-10-CM

## 2023-03-15 PROCEDURE — 99214 OFFICE O/P EST MOD 30 MIN: CPT

## 2023-03-15 RX ORDER — LEVOTHYROXINE SODIUM 200 UG/1
200 TABLET ORAL DAILY
Qty: 90 TABLET | Refills: 4 | Status: SHIPPED | OUTPATIENT
Start: 2023-03-15 | End: 2024-03-27

## 2023-03-15 ASSESSMENT — PAIN SCALES - GENERAL: PAINLEVEL: NO PAIN (0)

## 2023-03-15 NOTE — LETTER
3/15/2023       RE: Kaitlyn Paul  [address]  [place] [address]     Dear Colleague,    Thank you for referring your patient, Kaitlyn Paul, to the [facility] ENDOCR[facility]OLOG[facility] [place] at [facility]. Please see a copy of my visit note below.    Endocrine  note-     Attending Assessment/Plan :     1.  Papillary thyroid carcinoma, 3.2 cm left with ETE, + 5 LN positive. She has been treated with near total Tx and CND, 103 mCi 131.   Reduce US frequency - next before next appt one year    2.  Post surgical hypothyroidism.  Treat to target TSH < 0.4.  on LT4 175 x 7.5/week  Increase to LT4 200 mcg/day .  Labs in Mid may    __ minutes spent on the date of the encounter doing chart review, history and exam, documentation and further activities as noted above.    Chief complaint: HISTORY OF PRESENT ILLNESS    Celeste presents for follow up of papillary thyroid carcinoma, post surgical hypothyroidism.  I last saw her 3/2022. At that time she was on LT4  175 x 7.5/week (which averages 187.5 mcg/day) .  In 2022 she announced her pregnancy.  Appropriate monthly TFTS were ordered by me.  Today I see they were not done as I had expected.  Indeed, she was diagnosed with missed   in 10/2022.   We were not told of this so the pregnancy related lab orders remained on the system (and not thyroid cancer related lab orders). She had labs drawn on 3/9 which did not include what I would have ordered had I known this .    The thyroid cancer treatment has been as follows:   14 Near total thyroidectomy with central lymph node dissection by Dr Sandra Mims at .  3.2 cm PTC left with ETE but no LV invasion.   LN were positive , larges tumor deposit in LN 0.4 cm.    14 103 mCi 131I    We have the following labs  14 25OHD 18, PTH 29, Ca 9.4,   14 PTH 11  14 PTH 14  14: PTH 14, Ca 9.5  14: Tg 1.8, LOR < 1, TSH  "123.7, PTH 42  Radioiodine 103 mCi  14: Tg 0.3, LOR < 1, TSH 21.8  16: Tg < 0.1, TSH 0.44  17: Tg 0.1, LOR < 1, TSH 0.27  18 Tg < 0.1, LOR < 1, TPO 32  3/29/19: 25OHD 36  19 Tg < 0.1, LOR < 1, TSH 0.23, free  T4 2.03  19 TSH 0.28  20 TSH 3.31  2020 Tg 0.1 (Access), LOR < 1 (Access), TSH 2.01, free T4 1.73  2020 [place]  TSH 1.567  10/12/2020 Tg < 0.1, LOR < 0.4, TSh 0.21  2021 Tg < 0.1, LOR < 0.4, TSH 0.47, free T4 1.25.   2021 TSH 0.21  10/25/2021 TSH 0.6  2022 TSH 0.4, free T4 1.44, Tg < 0.1, LOR < 0.4 .  2022 TSH 1.45, total T4 9.9 - pregnancy early lst trimester  10/17/22 TSH 0.66, total T4 9.5   3/9/23 TSH 1.26, total T4 10.3     Imaging  13 CT Doc  13 US neck  14 103 mCi 131I  14 131I post therapy TBS: negative  14 4.06 mCi 123I TBS: negative  16 US neck  17 US thyroid  18 US neck   19 thyroid US [facility]-- we do not have the images from this study, only the report is viewable on care everywhere   2021 neck US: compared with  2020 neck US as read by me again today.    Left level 4 #1  0.7 x 0.5 x 0.6 cm  was 0.6 x 0.5 x 0.6 cm     REVIEW OF SYSTEMS  Weight is up   Feels OK  ewnrgy OK  Sleep OK  Cardiac: negative  Respiratory: negative except \"out of shape'  GI; negative  Menses monthly    No pregnancy plans    Past Medical History  Past Medical History:   Diagnosis Date     Arthritis      Dry eye      Encounter for IUD insertion 2020    Mirena inserted by Dr Barnett     Encounter for IUD removal 2018 and then again [id number]     History of recurrent miscarriages     x 4     Missed  10/2022     Papillary thyroid carcinoma (H) 2014     Post-surgical hypothyroidism 2014     Past Surgical History:   Procedure Laterality Date     central neck dissection  2014     D & C      2018, 2019, 2020     HC KNEE SCOPE,MED/LAT MENISECTOMY  2009     HC REMOVAL " GALLBLADDER  2005    lap      near total thyroidectomy  02/04/2014     [facility] NONSPECIFIC PROCEDURE  2007    lipoma excision from her back     [facility] REPAIR CRUCIATE LIGAMENT,KNEE  1999    left       Medications  Current Outpatient Medications   Medication Sig Dispense Refill     Bacillus Coagulans-Inulin (ALIGN PREBIOTIC-PROBIOTIC PO)        clobetasol (TEMOVATE) 0.05 % external ointment Apply topically 2 times daily 60 g 11     levothyroxine (SYNTHROID/LEVOTHROID) 200 MCG tablet Take 1 tablet (200 mcg) by mouth daily 90 tablet 4     multivitamin w/minerals (THERA-VIT-M) tablet Take 1 tablet by mouth daily       Omega-3 1000 MG capsule Take 1 g by mouth daily       Using pill tray - doesn't ever miss tablets.          Allergies  Allergies   Allergen Reactions     Sulfa Drugs      Rash as a child       Family History  family history includes Cardiovascular in her maternal grandfather, maternal grandmother, and paternal grandfather; Cerebrovascular Disease in her maternal grandmother; Colon Cancer in her maternal grandmother; Diabetes in her father; Gastrointestinal Disease in her paternal grandmother; Graves' disease in her mother; No Known Problems in her brother, sister, and another family member; Thyroid Disease in her mother.      Social History  Social History     Tobacco Use     Smoking status: Never     Smokeless tobacco: Never   Vaping Use     Vaping Use: Never used   Substance Use Topics     Alcohol use: No     Drug use: No     ;  is having vasectomy soon    Physical Exam  GENERAL mask  /81   Pulse 64   Wt 100.6 kg (221 lb 11.2 oz)   SpO2 100%   BMI 31.81 kg/m    SKIN: normal color, temperature, texture   HEENT: PER, no scleral icterus, eyelid retraction, stare, lid lag, proptosis or conjunctival injection.  .    NECK: supple.  No visible or palpable neck masses, cervical adenopathy,or goiter.   LUNGS: clear to auscultation bilaterally.   CARDIAC: RRR, S1, S2 without murmurs, rubs or  gallops.    BACK: normal spinal contour.    NEURO: Alert, responds appropriately to questions,  moves all extremities, DTRs 2/4, gait normal, no tremor of the outstretched hand    DATA REVIEW    ENDO THYROID LABS-[facility] Latest Ref Rng & Units 3/9/2023   THYROID STIMULATING HORMONE 0.40 - 4.00 mU/L    THYROID STIMULATING HORMONE (R) 0.30 - 4.20 uIU/mL 1.26   T4 4.5 - 11.7 ug/dL 10.3   FREE T4 0.76 - 1.46 ng/dL      ENDO THYROID LABS-[facility] Latest Ref Rng & Units 10/17/2022   THYROID STIMULATING HORMONE 0.40 - 4.00 mU/L 0.66   THYROID STIMULATING HORMONE (R) 0.30 - 4.20 uIU/mL    T4 4.5 - 11.7 ug/dL 9.5   FREE T4 0.76 - 1.46 ng/dL      ENDO THYROID LABS-[facility] Latest Ref Rng & Units 9/16/2022   THYROID STIMULATING HORMONE 0.40 - 4.00 mU/L    THYROID STIMULATING HORMONE (R) 0.30 - 4.20 uIU/mL 1.45   T4 4.5 - 11.7 ug/dL 9.9   FREE T4 0.76 - 1.46 ng/dL      ENDO THYROID LABS-[facility] Latest Ref Rng & Units 7/29/2022   THYROID STIMULATING HORMONE 0.40 - 4.00 mU/L 0.40   THYROID STIMULATING HORMONE (R) 0.30 - 4.20 uIU/mL    T4 4.5 - 11.7 ug/dL    FREE T4 0.76 - 1.46 ng/dL 1.44       Again, thank you for allowing me to participate in the care of your patient.      Sincerely,    Ghislaine Streeter MD

## 2023-03-15 NOTE — PATIENT INSTRUCTIONS
Increase levothyroxine to 200 mcg/day    Labs Mid May 2023    See me approximately yearly     Yearly labs about 2 weeks prior to next appt    Next neck ultrasound about 2 weeks prior to the next appt in about one  year

## 2023-03-15 NOTE — PROGRESS NOTES
Endocrine  note-     Attending Assessment/Plan :     1.  Papillary thyroid carcinoma, 3.2 cm left with ETE, + 5 LN positive. She has been treated with near total Tx and CND, 103 mCi 131.   Reduce US frequency - next before next appt one year    2.  Post surgical hypothyroidism.  Treat to target TSH < 0.4.  on LT4 175 x 7.5/week  Increase to LT4 200 mcg/day .  Labs in Mid may    Addendum  23 Tg < 0.1, LOR < 0.4, TSH 0.4, free T4 1.99- at target .  3/4/24 Tg < 0.1, LOR < 0.4, TSH 0.1, free T4 2.1   3/11/24 neck US - upcoming appt 3/27/24    _31_ minutes spent on the date of the encounter doing chart review, history and exam, documentation and further activities as noted above.    Chief complaint: HISTORY OF PRESENT ILLNESS    Celeste presents for follow up of papillary thyroid carcinoma, post surgical hypothyroidism.  I last saw her 3/2022. At that time she was on LT4  175 x 7.5/week (which averages 187.5 mcg/day) .  In 2022 she announced her pregnancy.  Appropriate monthly TFTS were ordered by me.  Today I see they were not done as I had expected.  Indeed, she was diagnosed with missed   in 10/2022.   We were not told of this so the pregnancy related lab orders remained on the system (and not thyroid cancer related lab orders). She had labs drawn on 3/9 which did not include what I would have ordered had I known this .    The thyroid cancer treatment has been as follows:   14 Near total thyroidectomy with central lymph node dissection by Dr Sandra Mims at .  3.2 cm PTC left with ETE but no LV invasion.   LN were positive , larges tumor deposit in LN 0.4 cm.    14 103 mCi 131I    We have the following labs  14 25OHD 18, PTH 29, Ca 9.4,   14 PTH 11  14 PTH 14  14: PTH 14, Ca 9.5  14: Tg 1.8, LOR < 1, .7, PTH 42  Radioiodine 103 mCi  14: Tg 0.3, LOR < 1, TSH 21.8  16: Tg < 0.1, TSH 0.44  17: Tg 0.1, LOR < 1, TSH 0.27  18 Tg < 0.1, LOR < 1,  "TPO 32  3/29/19: 25OHD 36  19 Tg < 0.1, LOR < 1, TSH 0.23, free  T4 2.03  19 TSH 0.28  20 TSH 3.31  2020 Tg 0.1 (Access), LOR < 1 (Access), TSH 2.01, free T4 1.73  2020 [place]  TSH 1.567  10/12/2020 Tg < 0.1, LOR < 0.4, TSh 0.21  2021 Tg < 0.1, LOR < 0.4, TSH 0.47, free T4 1.25.   2021 TSH 0.21  10/25/2021 TSH 0.6  2022 TSH 0.4, free T4 1.44, Tg < 0.1, LOR < 0.4 .  2022 TSH 1.45, total T4 9.9 - pregnancy early lst trimester  10/17/22 TSH 0.66, total T4 9.5   3/9/23 TSH 1.26, total T4 10.3     Imaging  13 CT Doc  13 US neck  14 103 mCi 131I  14 131I post therapy TBS: negative  14 4.06 mCi 123I TBS: negative  16 US neck  17 US thyroid  18 US neck   19 thyroid US [facility]-- we do not have the images from this study, only the report is viewable on care everywhere   2021 neck US: compared with  2020 neck US as read by me again today.    Left level 4 #1  0.7 x 0.5 x 0.6 cm  was 0.6 x 0.5 x 0.6 cm     REVIEW OF SYSTEMS  Weight is up   Feels OK  ewnrgy OK  Sleep OK  Cardiac: negative  Respiratory: negative except \"out of shape'  GI; negative  Menses monthly    No pregnancy plans    Past Medical History  Past Medical History:   Diagnosis Date    Arthritis     Dry eye     Encounter for IUD insertion 2020    Mirena inserted by Dr Barnett    Encounter for IUD removal 2018    [id number] and then again [id number]    History of recurrent miscarriages     x 4    Missed  10/2022    Papillary thyroid carcinoma (H) 2014    Post-surgical hypothyroidism 2014     Past Surgical History:   Procedure Laterality Date    central neck dissection  2014    D & C      2018, 2019, 2020    HC KNEE SCOPE,MED/LAT MENISECTOMY       REMOVAL GALLBLADDER      lap     near total thyroidectomy  2014    [facility] NONSPECIFIC PROCEDURE      lipoma excision from her back    [facility] REPAIR CRUCIATE LIGAMENT,KNEE      " left       Medications  Current Outpatient Medications   Medication Sig Dispense Refill    Bacillus Coagulans-Inulin (ALIGN PREBIOTIC-PROBIOTIC PO)       clobetasol (TEMOVATE) 0.05 % external ointment Apply topically 2 times daily 60 g 11    levothyroxine (SYNTHROID/LEVOTHROID) 200 MCG tablet Take 1 tablet (200 mcg) by mouth daily 90 tablet 4    multivitamin w/minerals (THERA-VIT-M) tablet Take 1 tablet by mouth daily      Omega-3 1000 MG capsule Take 1 g by mouth daily       Using pill tray - doesn't ever miss tablets.          Allergies  Allergies   Allergen Reactions    Sulfa Drugs      Rash as a child       Family History  family history includes Cardiovascular in her maternal grandfather, maternal grandmother, and paternal grandfather; Cerebrovascular Disease in her maternal grandmother; Colon Cancer in her maternal grandmother; Diabetes in her father; Gastrointestinal Disease in her paternal grandmother; Graves' disease in her mother; No Known Problems in her brother, sister, and another family member; Thyroid Disease in her mother.      Social History  Social History     Tobacco Use    Smoking status: Never    Smokeless tobacco: Never   Vaping Use    Vaping Use: Never used   Substance Use Topics    Alcohol use: No    Drug use: No     ;  is having vasectomy soon    Physical Exam  GENERAL mask  /81   Pulse 64   Wt 100.6 kg (221 lb 11.2 oz)   SpO2 100%   BMI 31.81 kg/m    SKIN: normal color, temperature, texture   HEENT: PER, no scleral icterus, eyelid retraction, stare, lid lag, proptosis or conjunctival injection.  .    NECK: supple.  No visible or palpable neck masses, cervical adenopathy,or goiter.   LUNGS: clear to auscultation bilaterally.   CARDIAC: RRR, S1, S2 without murmurs, rubs or gallops.    BACK: normal spinal contour.    NEURO: Alert, responds appropriately to questions,  moves all extremities, DTRs 2/4, gait normal, no tremor of the outstretched hand    DATA REVIEW    ENDO  THYROID LABS-[facility] Latest Ref Rng & Units 3/9/2023   THYROID STIMULATING HORMONE 0.40 - 4.00 mU/L    THYROID STIMULATING HORMONE (R) 0.30 - 4.20 uIU/mL 1.26   T4 4.5 - 11.7 ug/dL 10.3   FREE T4 0.76 - 1.46 ng/dL      ENDO THYROID LABS-[facility] Latest Ref Rng & Units 10/17/2022   THYROID STIMULATING HORMONE 0.40 - 4.00 mU/L 0.66   THYROID STIMULATING HORMONE (R) 0.30 - 4.20 uIU/mL    T4 4.5 - 11.7 ug/dL 9.5   FREE T4 0.76 - 1.46 ng/dL      ENDO THYROID LABS-[facility] Latest Ref Rng & Units 9/16/2022   THYROID STIMULATING HORMONE 0.40 - 4.00 mU/L    THYROID STIMULATING HORMONE (R) 0.30 - 4.20 uIU/mL 1.45   T4 4.5 - 11.7 ug/dL 9.9   FREE T4 0.76 - 1.46 ng/dL      ENDO THYROID LABS-[facility] Latest Ref Rng & Units 7/29/2022   THYROID STIMULATING HORMONE 0.40 - 4.00 mU/L 0.40   THYROID STIMULATING HORMONE (R) 0.30 - 4.20 uIU/mL    T4 4.5 - 11.7 ug/dL    FREE T4 0.76 - 1.46 ng/dL 1.44         EXAMINATION: US HEAD NECK SOFT TISSUE, 3/11/2024 9:22 AM      COMPARISON: Neck ultrasound 9/24/2021     HISTORY: Papillary thyroid carcinoma (H); Post-surgical hypothyroidism     TECHNIQUE: Sonographic imaging performed of the neck to evaluate for  lymph nodes using grey scale and limited Doppler technique.     FINDINGS:     Lymph nodes are measured bilaterally with measurements given in  transverse, AP, and craniocaudal dimensions as follows:     Right:  Level 2: 1.1 x 0.8 x 2.5 cm oval, hypoechoic lymph node with normal  fatty hilum. 0.8 x 0.5 x 2.0 cm ovoid hypoechoic lymph node with  normal fatty hilum. These appear similar compared to prior.  Level 3: No nodes.  Level 4: No nodes.  Level 5: No nodes.  Level 6: No nodes or suspicious soft tissue within the thyroidectomy  bed  Level 7:No nodes.     Left:  Level 2: 1.2 x 0.6 x 1.5 cm ovoid, hypoechoic lymph node with normal  fatty hilum. 1.4 x 0.5 x 2.6 cm ovoid, hypoechoic lymph node with  normal fatty hilum. These appear similar compared to prior.  Level 3: No nodes.  Level 4: Rounded, hypoechoic,  0.7 x 0.5 x 0.6 cm node, unchanged  compared to prior.  Level 5: No nodes.  Level 6: No nodes or suspicious soft tissue within the thyroidectomy  bed.  Level 7:No nodes.                                                                      IMPRESSION:  1.  Soft tissue neck ultrasound with lymph node measurements as  described above. No suspicious cervical lymphadenopathy or abnormal  soft tissue within the thyroidectomy bed.     I have personally reviewed the examination and initial interpretation  and I agree with the findings.     LUIS BRAXTON, DO

## 2023-03-15 NOTE — LETTER
Date:March 16, 2023      Provider requested that no letter be sent. Do not send.       [facility]

## 2023-04-03 ENCOUNTER — OFFICE VISIT (OUTPATIENT)
Dept: FAMILY MEDICINE | Facility: CLINIC | Age: 38
End: 2023-04-03
Payer: COMMERCIAL

## 2023-04-03 VITALS
OXYGEN SATURATION: 97 % | TEMPERATURE: 98.3 F | SYSTOLIC BLOOD PRESSURE: 126 MMHG | HEART RATE: 72 BPM | BODY MASS INDEX: 32.5 KG/M2 | WEIGHT: 219.4 LBS | DIASTOLIC BLOOD PRESSURE: 80 MMHG | HEIGHT: 69 IN | RESPIRATION RATE: 16 BRPM

## 2023-04-03 DIAGNOSIS — Z12.31 ENCOUNTER FOR SCREENING MAMMOGRAM FOR BREAST CANCER: ICD-10-CM

## 2023-04-03 DIAGNOSIS — Z13.6 SCREENING FOR CARDIOVASCULAR CONDITION: ICD-10-CM

## 2023-04-03 DIAGNOSIS — Z13.1 SCREENING FOR DIABETES MELLITUS: ICD-10-CM

## 2023-04-03 DIAGNOSIS — Z00.00 ROUTINE GENERAL MEDICAL EXAMINATION AT A HEALTH CARE FACILITY: Primary | ICD-10-CM

## 2023-04-03 PROBLEM — Z85.850 HISTORY OF THYROID CANCER: Status: ACTIVE | Noted: 2023-04-03

## 2023-04-03 LAB
CHOLEST SERPL-MCNC: 154 MG/DL
FASTING STATUS PATIENT QL REPORTED: YES
GLUCOSE SERPL-MCNC: 91 MG/DL (ref 70–99)
HDLC SERPL-MCNC: 40 MG/DL
LDLC SERPL CALC-MCNC: 95 MG/DL
NONHDLC SERPL-MCNC: 114 MG/DL
TRIGL SERPL-MCNC: 96 MG/DL

## 2023-04-03 PROCEDURE — 80061 LIPID PANEL: CPT | Performed by: PHYSICIAN ASSISTANT

## 2023-04-03 PROCEDURE — 82947 ASSAY GLUCOSE BLOOD QUANT: CPT | Performed by: PHYSICIAN ASSISTANT

## 2023-04-03 PROCEDURE — 36415 COLL VENOUS BLD VENIPUNCTURE: CPT | Performed by: PHYSICIAN ASSISTANT

## 2023-04-03 PROCEDURE — 99395 PREV VISIT EST AGE 18-39: CPT | Performed by: PHYSICIAN ASSISTANT

## 2023-04-03 RX ORDER — LORATADINE 10 MG/1
10 TABLET ORAL DAILY
COMMUNITY

## 2023-04-03 ASSESSMENT — PAIN SCALES - GENERAL: PAINLEVEL: NO PAIN (0)

## 2023-04-03 NOTE — PROGRESS NOTES
SUBJECTIVE:   CC: Celeste is an 37 year old who presents for preventive health visit.        View : No data to display.              Patient has been advised of split billing requirements and indicates understanding: Yes  History of Present Illness       Reason for visit:  Yearly physical    She eats 2-3 servings of fruits and vegetables daily.She consumes 0 sweetened beverage(s) daily.She exercises with enough effort to increase her heart rate 20 to 29 minutes per day.  She exercises with enough effort to increase her heart rate 3 or less days per week.   She is taking medications regularly.      Today's PHQ-2 Score:       1/23/2023     8:39 AM   PHQ-2 ( 1999 Pfizer)   Q1: Little interest or pleasure in doing things 0   Q2: Feeling down, depressed or hopeless 0   PHQ-2 Score 0     Have you ever done Advance Care Planning? (For example, a Health Directive, POLST, or a discussion with a medical provider or your loved ones about your wishes): No, advance care planning information given to patient to review.  Patient declined advance care planning discussion at this time.    Social History     Tobacco Use     Smoking status: Never     Smokeless tobacco: Never   Vaping Use     Vaping status: Never Used   Substance Use Topics     Alcohol use: No              View : No data to display.              Reviewed orders with patient.  Reviewed health maintenance and updated orders accordingly - Yes  BP Readings from Last 3 Encounters:   04/03/23 126/80   03/15/23 129/81   11/29/22 108/68    Wt Readings from Last 3 Encounters:   04/03/23 99.5 kg (219 lb 6.4 oz)   03/15/23 100.6 kg (221 lb 11.2 oz)   01/23/23 98 kg (216 lb)                  Patient Active Problem List   Diagnosis     Closed displaced fracture of proximal phalanx of lesser toe of left foot with routine healing, subsequent encounter     Hypothyroidism, postsurgical     History of thyroid cancer     Past Surgical History:   Procedure Laterality Date     central neck  dissection  02/04/2014     D & C      8/2018, 7/2019, 8/2020     HC KNEE SCOPE,MED/LAT MENISECTOMY  2009     HC REMOVAL GALLBLADDER  2005    lap      near total thyroidectomy  02/04/2014     Z NONSPECIFIC PROCEDURE  2007    lipoma excision from her back     Z REPAIR CRUCIATE LIGAMENT,KNEE  1999    left       Social History     Tobacco Use     Smoking status: Never     Smokeless tobacco: Never   Vaping Use     Vaping status: Never Used   Substance Use Topics     Alcohol use: No     Family History   Problem Relation Age of Onset     Graves' disease Mother      Thyroid Disease Mother         Graves     Diabetes Father      Cerebrovascular Disease Maternal Grandmother      Cardiovascular Maternal Grandmother      Colon Cancer Maternal Grandmother      Cardiovascular Maternal Grandfather      Gastrointestinal Disease Paternal Grandmother         celiac     Cardiovascular Paternal Grandfather      No Known Problems Sister      No Known Problems Brother      No Known Problems Other      Thyroid Cancer No family hx of            Breast Cancer Screening:  Any new diagnosis of family breast, ovarian, or bowel cancer? No    FHS-7:       4/3/2023     7:24 AM   Breast CA Risk Assessment (FHS-7)   Did any of your first-degree relatives have breast or ovarian cancer? No   Did any of your relatives have bilateral breast cancer? No   Did any man in your family have breast cancer? No   Did any woman in your family have breast and ovarian cancer? No   Did any woman in your family have breast cancer before age 50 y? Yes   Do you have 2 or more relatives with breast and/or ovarian cancer? No   Do you have 2 or more relatives with breast and/or bowel cancer? No       Patient under 40 years of age: Routine Mammogram Screening not recommended.   Pertinent mammograms are reviewed under the imaging tab.    History of abnormal Pap smear: NO - age 30-65 PAP every 5 years with negative HPV co-testing recommended     Reviewed and updated as  "needed this visit by clinical staff   Tobacco  Allergies  Meds  Problems  Med Hx  Surg Hx  Fam Hx          Reviewed and updated as needed this visit by Provider   Tobacco  Allergies  Meds  Problems  Med Hx  Surg Hx  Fam Hx             Review of Systems  CONSTITUTIONAL: NEGATIVE for fever, chills, change in weight  INTEGUMENTARU/SKIN: NEGATIVE for worrisome rashes, moles or lesions  EYES: NEGATIVE for vision changes or irritation  ENT: NEGATIVE for ear, mouth and throat problems  RESP: NEGATIVE for significant cough or SOB  BREAST: NEGATIVE for masses, tenderness or discharge  CV: NEGATIVE for chest pain, palpitations or peripheral edema  GI: NEGATIVE for nausea, abdominal pain, heartburn, or change in bowel habits  : NEGATIVE for unusual urinary or vaginal symptoms. Periods are regular.  MUSCULOSKELETAL: NEGATIVE for significant arthralgias or myalgia  NEURO: NEGATIVE for weakness, dizziness or paresthesias  PSYCHIATRIC: NEGATIVE for changes in mood or affect     OBJECTIVE:   /80   Pulse 72   Temp 98.3  F (36.8  C) (Tympanic)   Resp 16   Ht 1.765 m (5' 9.49\")   Wt 99.5 kg (219 lb 6.4 oz)   LMP 03/22/2023   SpO2 97%   BMI 31.95 kg/m    Physical Exam  GENERAL: healthy, alert and no distress  EYES: Eyes grossly normal to inspection, PERRL and conjunctivae and sclerae normal  HENT: ear canals and TM's normal, nose and mouth without ulcers or lesions  NECK: no adenopathy, no asymmetry, masses. Well-healed thyroidectomy scar  RESP: lungs clear to auscultation - no rales, rhonchi or wheezes  CV: regular rate and rhythm, normal S1 S2, no S3 or S4, no murmur, click or rub, no peripheral edema and peripheral pulses strong  ABDOMEN: soft, nontender, no hepatosplenomegaly, no masses and bowel sounds normal  MS: no gross musculoskeletal defects noted, no edema  SKIN: no suspicious lesions or rashes  NEURO: Normal strength and tone, mentation intact and speech normal  PSYCH: mentation appears normal, " "affect normal/bright        ASSESSMENT/PLAN:       ICD-10-CM    1. Routine general medical examination at a health care facility  Z00.00       2. Screening for cardiovascular condition  Z13.6 Lipid panel reflex to direct LDL Fasting     Lipid panel reflex to direct LDL Fasting      3. Screening for diabetes mellitus  Z13.1 Glucose     Glucose      4. Encounter for screening mammogram for breast cancer  Z12.31 MA Screen Bilateral w/Tommy        COUNSELING:  Reviewed preventive health counseling, as reflected in patient instructions      BMI:   Estimated body mass index is 31.95 kg/m  as calculated from the following:    Height as of this encounter: 1.765 m (5' 9.49\").    Weight as of this encounter: 99.5 kg (219 lb 6.4 oz).   Weight management plan: Discussed healthy diet and exercise guidelines per patient instructions      She reports that she has never smoked. She has never used smokeless tobacco.      JUAN JOSÉ [facility]  "

## 2023-04-03 NOTE — PROGRESS NOTES
{PROVIDER CHARTING PREFERENCE:939196}    Subjective   Celeste is a 37 year old, presenting for the following health issues:  No chief complaint on file.         View : No data to display.              History of Present Illness       Reason for visit:  Yearly physical    She eats 2-3 servings of fruits and vegetables daily.She consumes 0 sweetened beverage(s) daily.She exercises with enough effort to increase her heart rate 20 to 29 minutes per day.  She exercises with enough effort to increase her heart rate 3 or less days per week.   She is taking medications regularly.       {SUPERLIST (Optional):140729}  {additonal problems for provider to add (Optional):010145}      Review of Systems   {ROS COMP (Optional):819855}      Objective    There were no vitals taken for this visit.  There is no height or weight on file to calculate BMI.  Physical Exam   {Exam List (Optional):924886}    {Diagnostic Test Results (Optional):048049}    {AMBULATORY ATTESTATION (Optional):830388}

## 2023-05-03 NOTE — PROGRESS NOTES
Kaitlyn is a 37 year old  female who presents for annual exam.     Besides routine health maintenance, she has no other health concerns today .    HPI:  Here today for yearly exam --has not been to our office since miscarriage in 2021.  Regular, monthly (~q26d) menses x 3d.  A bit heavier bleeding on first 2 days and then tapers.  No intermenstrual bleeding/spotting.  +occ cramping.  +occ clots.  +SA --no issues.   to have vasectomy this summer.  No bowel/bladder issues.  occ leaking with jumping/trampoline but not a daily issue  -wondering about menopause; mother didn't go through menopause until very late; at some point during her fertility/miscarriage workup, someone told her that with lower ovarian reserve, she was more likely to go through earlier menopause.  No hot flushes, night sweats, etc.  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    Regular menses? yes  Menses every 26 days.  Length of menses: 3 days    Her current contraception method is: none.  She  reports that she has never smoked. She has never used smokeless tobacco.    Patient is sexually active.  STD testing offered?  Declined  Last PHQ-9 score on record =       2023     1:40 PM   PHQ-9 SCORE   PHQ-9 Total Score 0     Last GAD7 score on record  =       2023     1:40 PM   TUCKER-7 SCORE   Total Score 0     Alcohol Score = 1    HEALTH MAINTENANCE:  Cholesterol:   Cholesterol   Date Value Ref Range Status   2023 154 <200 mg/dL Final   2020 152 <200 mg/dL Final   Last Mammo: Not applicable, Result: Not applicable, Next Mammo: Due at age 38   Pap: (No results found for: GYNINTERP, PAP ) 2020 WNL HPV (-)neg  Colonoscopy:  NA, Result: Not applicable, Next Colonoscopy: NA years.  Dexa:  NA    Health maintenance updated:  yes    HISTORY:  OB History    Para Term  AB Living   8 1 1 0 7 1   SAB IAB Ectopic Multiple Live Births   7 0 0 0 1      # Outcome Date GA Lbr Dharmesh/2nd Weight Sex Delivery Anes PTL Lv   8 SAB 10/17/22     SAB      7 SAB 10/29/21 9w0d          6 SAB 2018           5 Term 2016    M    SHAISTA      Name: Anthony   4 SAB            3 SAB            2 SAB            1 SAB                Patient Active Problem List   Diagnosis     Closed displaced fracture of proximal phalanx of lesser toe of left foot with routine healing, subsequent encounter     Hypothyroidism, postsurgical     History of thyroid cancer     Past Surgical History:   Procedure Laterality Date     central neck dissection  2014     D & C      2018, 2019, 2020     HC KNEE SCOPE,MED/LAT MENISECTOMY       HC REMOVAL GALLBLADDER      lap      near total thyroidectomy  2014     [facility] NONSPECIFIC PROCEDURE  2007    lipoma excision from her back     [facility] REPAIR CRUCIATE LIGAMENT,KNEE  1999    left      Social History     Tobacco Use     Smoking status: Never     Smokeless tobacco: Never   Vaping Use     Vaping status: Never Used   Substance Use Topics     Alcohol use: No      Problem (# of Occurrences) Relation (Name,Age of Onset)    Cardiovascular (3) Maternal Grandmother (Doris), Maternal Grandfather, Paternal Grandfather    Diabetes (1) Father (Johnny)    Gastrointestinal Disease (1) Paternal Grandmother: celiac    Cerebrovascular  "Disease (1) Maternal Grandmother (Doris)    Thyroid Disease (1) Mother (Leticia): Graves    Graves' disease (1) Mother (Leticia)    Colon Cancer (1) Maternal Grandmother (Doris)    No Known Problems (3) Sister, Brother, Other       Negative family history of: Thyroid Cancer            Current Outpatient Medications   Medication Sig     Bacillus Coagulans-Inulin (ALIGN PREBIOTIC-PROBIOTIC PO)      clobetasol (TEMOVATE) 0.05 % external ointment Apply topically 2 times daily     levothyroxine (SYNTHROID/LEVOTHROID) 200 MCG tablet Take 1 tablet (200 mcg) by mouth daily     loratadine (CLARITIN) 10 MG tablet Take 10 mg by mouth daily     multivitamin w/minerals (THERA-VIT-M) tablet Take 1 tablet by mouth daily     Omega-3 1000 MG capsule Take 1 g by mouth daily     No current facility-administered medications for this visit.     Allergies   Allergen Reactions     Sulfa Antibiotics      Rash as a child       Past medical, surgical, social and family histories were reviewed and updated in EPIC.    ROS:   12 point review of systems negative other than symptoms noted below or in the HPI.  No urinary frequency or dysuria, bladder or kidney problems, Normal menstrual cycles    EXAM:  /70   Ht 1.791 m (5' 10.5\")   Wt 100.2 kg (221 lb)   LMP 05/08/2023 (Exact Date)   BMI 31.26 kg/m     BMI: Body mass index is 31.26 kg/m .    PHYSICAL EXAM:  Constitutional:   Appearance: Well nourished, well developed, alert, in no acute distress  Neck:  Lymph Nodes:  No lymphadenopathy present    Thyroid:  Gland size normal, nontender, no nodules or masses present  on palpation  Chest:  Respiratory Effort:  Breathing unlabored  Cardiovascular:    Heart: Auscultation:  Regular rate, normal rhythm, no murmurs present  Breasts: Palpation of Breasts and Axillae:  No masses present on palpation, no breast tenderness., Axillary Lymph Nodes:  No lymphadenopathy present. and No nodularity, asymmetry or nipple discharge " bilaterally.  Gastrointestinal:   Abdominal Examination:  Abdomen nontender to palpation, tone normal without rigidity or guarding, no masses present, umbilicus without lesions   Liver and Spleen:  No hepatomegaly present, liver nontender to palpation    Hernias:  No hernias present  Lymphatic: Lymph Nodes:  No other lymphadenopathy present  Skin:  General Inspection:  No rashes present, no lesions present, no areas of  discoloration  Neurologic:    Mental Status:  Oriented X3.  Normal strength and tone, sensory exam                grossly normal, mentation intact and speech normal.    Psychiatric:   Mentation appears normal and affect normal/bright.         Pelvic Exam:  External Genitalia:     Normal appearance for age, no discharge present, no tenderness present, no inflammatory lesions present, color normal  Vagina:     Normal vaginal vault without central or paravaginal defects, no discharge present, no inflammatory lesions present, no masses present  Bladder:     Nontender to palpation  Urethra:   Urethral Body:  Urethra palpation normal, urethra structural support normal   Urethral Meatus:  No erythema or lesions present  Cervix:     Appearance healthy, no lesions present, nontender to palpation, no bleeding present  Uterus:     Uterus: firm, normal sized and nontender, midplane in position.   Adnexa:     No adnexal tenderness present, no adnexal masses present  Perineum:     Perineum within normal limits, no evidence of trauma, no rashes or skin lesions present  Anus:     Anus within normal limits, no hemorrhoids present  Inguinal Lymph Nodes:     No lymphadenopathy present  Pubic Hair:     Normal pubic hair distribution for age  Genitalia and Groin:     No rashes present, no lesions present, no areas of discoloration, no masses present      COUNSELING:   Reviewed preventive health counseling, as reflected in patient instructions  Special attention given to:        Regular exercise       Healthy  diet/nutrition       Family planning    BMI: Body mass index is 31.26 kg/m .  Weight management plan: Discussed healthy diet and exercise guidelines Patient was referred to their PCP to discuss a diet and exercise plan.    ASSESSMENT:  37 year old female with satisfactory annual exam.    ICD-10-CM    1. Encounter for gynecological examination without abnormal finding  Z01.419           PLAN:  Patient Instructions   Follow up with your primary care provider/endocrinologist for your other medical problems.  Continue self breast exam.  Increase physical activity and exercise.  Usual safety and preventative measures counseling done.  Last pap smear (2020) was normal and negative for the DNA of high risk HPV subtypes.  No pap was obtained this year.  This was discussed with the patient and she agrees with the plan.   Will schedule first mammogram this year.      Michaela Barnett MD

## 2023-05-09 ENCOUNTER — OFFICE VISIT (OUTPATIENT)
Dept: OBGYN | Facility: CLINIC | Age: 38
End: 2023-05-09
Payer: COMMERCIAL

## 2023-05-09 VITALS
HEIGHT: 71 IN | DIASTOLIC BLOOD PRESSURE: 70 MMHG | WEIGHT: 221 LBS | BODY MASS INDEX: 30.94 KG/M2 | SYSTOLIC BLOOD PRESSURE: 112 MMHG

## 2023-05-09 DIAGNOSIS — Z01.419 ENCOUNTER FOR GYNECOLOGICAL EXAMINATION WITHOUT ABNORMAL FINDING: Primary | ICD-10-CM

## 2023-05-09 PROCEDURE — 99395 PREV VISIT EST AGE 18-39: CPT | Performed by: OBSTETRICS & GYNECOLOGY

## 2023-05-09 ASSESSMENT — ANXIETY QUESTIONNAIRES
3. WORRYING TOO MUCH ABOUT DIFFERENT THINGS: NOT AT ALL
2. NOT BEING ABLE TO STOP OR CONTROL WORRYING: NOT AT ALL
7. FEELING AFRAID AS IF SOMETHING AWFUL MIGHT HAPPEN: NOT AT ALL
5. BEING SO RESTLESS THAT IT IS HARD TO SIT STILL: NOT AT ALL
GAD7 TOTAL SCORE: 0
IF YOU CHECKED OFF ANY PROBLEMS ON THIS QUESTIONNAIRE, HOW DIFFICULT HAVE THESE PROBLEMS MADE IT FOR YOU TO DO YOUR WORK, TAKE CARE OF THINGS AT HOME, OR GET ALONG WITH OTHER PEOPLE: NOT DIFFICULT AT ALL
6. BECOMING EASILY ANNOYED OR IRRITABLE: NOT AT ALL
1. FEELING NERVOUS, ANXIOUS, OR ON EDGE: NOT AT ALL
GAD7 TOTAL SCORE: 0

## 2023-05-09 ASSESSMENT — PATIENT HEALTH QUESTIONNAIRE - PHQ9
5. POOR APPETITE OR OVEREATING: NOT AT ALL
SUM OF ALL RESPONSES TO PHQ QUESTIONS 1-9: 0

## 2023-05-09 NOTE — PATIENT INSTRUCTIONS
Follow up with your primary care provider/endocrinologist for your other medical problems.  Continue self breast exam.  Increase physical activity and exercise.  Usual safety and preventative measures counseling done.  Last pap smear (2020) was normal and negative for the DNA of high risk HPV subtypes.  No pap was obtained this year.  This was discussed with the patient and she agrees with the plan.   Will schedule first mammogram this year.

## 2023-05-11 NOTE — PATIENT INSTRUCTIONS
PATIENT INSTRUCTIONS - Podiatry / Foot & Ankle Surgery    Wear ankle brace & cinch into eversion for the next few months     Wear compression underneath    Physical Therapy  You have been referred to [facility].  Locations can be found online.  Please call to schedule an appointment:  [phone]      If ankle not improved, call for boot or MRI    5th toe - we can repeat x-rays at >1 yr out if not improved      
none

## 2023-05-17 ENCOUNTER — LAB (OUTPATIENT)
Dept: LAB | Facility: CLINIC | Age: 38
End: 2023-05-17
Payer: COMMERCIAL

## 2023-05-17 DIAGNOSIS — E89.0 POST-SURGICAL HYPOTHYROIDISM: ICD-10-CM

## 2023-05-17 DIAGNOSIS — Z11.4 SCREENING FOR HIV (HUMAN IMMUNODEFICIENCY VIRUS): ICD-10-CM

## 2023-05-17 DIAGNOSIS — Z11.59 NEED FOR HEPATITIS C SCREENING TEST: ICD-10-CM

## 2023-05-17 DIAGNOSIS — C73 PAPILLARY THYROID CARCINOMA (H): ICD-10-CM

## 2023-05-17 LAB
T4 FREE SERPL-MCNC: 1.99 NG/DL (ref 0.9–1.7)
TSH SERPL DL<=0.005 MIU/L-ACNC: 0.4 UIU/ML (ref 0.3–4.2)

## 2023-05-17 PROCEDURE — 36415 COLL VENOUS BLD VENIPUNCTURE: CPT | Performed by: PATHOLOGY

## 2023-05-17 PROCEDURE — 84443 ASSAY THYROID STIM HORMONE: CPT | Performed by: PATHOLOGY

## 2023-05-17 PROCEDURE — 84432 ASSAY OF THYROGLOBULIN: CPT | Mod: 90 | Performed by: PATHOLOGY

## 2023-05-17 PROCEDURE — 99000 SPECIMEN HANDLING OFFICE-LAB: CPT | Performed by: PATHOLOGY

## 2023-05-17 PROCEDURE — 84439 ASSAY OF FREE THYROXINE: CPT | Performed by: PATHOLOGY

## 2023-05-17 PROCEDURE — 86800 THYROGLOBULIN ANTIBODY: CPT | Mod: 90 | Performed by: PATHOLOGY

## 2023-05-22 LAB — SCANNED LAB RESULT: NORMAL

## 2023-06-20 ENCOUNTER — NURSE TRIAGE (OUTPATIENT)
Dept: FAMILY MEDICINE | Facility: CLINIC | Age: 38
End: 2023-06-20
Payer: COMMERCIAL

## 2023-06-20 NOTE — TELEPHONE ENCOUNTER
Nurse Triage SBAR    Is this a 2nd Level Triage? NO    Situation: Patient calling clinic reporting flu like symptoms. Patient stated she is experiencing nasal congestion, throat soreness, and fatigue. Patient stated she has tested negative for Covid.     Background: Patient stated she does not have any high risk conditions.     Assessment: Patient denied any red flag symptoms.     Protocol Recommended Disposition:   Home Care    Recommendation:  Per disposition, RN advised patient to continue managing symptoms at home. RN gave additional recommendations to patient for home care, patient agreed with plan of care and will continue to monitor symptoms.     Does the patient meet one of the following criteria for ADS visit consideration? 16+ years old, with an [facility] PCP     TIP  Providers, please consider if this condition is appropriate for management at one of our Acute and Diagnostic Services sites.     If patient is a good candidate, please use dotphrase <dot>triageresponse and select Refer to ADS to document.      Reason for Disposition    Probable mild influenza (no fever) or a common cold with no complications and not HIGH RISK    Additional Information    Negative: SEVERE difficulty breathing (e.g., struggling for each breath, speaks in single words)    Negative: Bluish (or gray) lips or face    Negative: Shock suspected (e.g., cold/pale/clammy skin, too weak to stand, low BP, rapid pulse)    Negative: Sounds like a life-threatening emergency to the triager    Negative: Symptoms of COVID-19 (e.g., cough, fever, SOB, or others) and lives in an area with community spread    Negative: Sounds like a cold and there is no fever    Negative: Cough and there is no fever    Negative: Severe cough    Negative: Throat pain and there is no fever    Negative: Severe sore throat    Negative: Influenza vaccine reaction is suspected    Negative: Headache and stiff neck (can't touch chin to chest)    Negative: Chest pain   (Exception: MILD central chest pain, present only when coughing.)    Negative: Difficulty breathing that is not severe and not relieved by cleaning out the nose    Negative: Patient sounds very sick or weak to the triager    Negative: Fever > 104 F (40 C)    Negative: Fever > 101 F (38.3 C) and over 60 years of age    Negative: Fever > 100.0 F (37.8 C) and diabetes mellitus or weak immune system (e.g., HIV positive, cancer chemo, splenectomy, organ transplant, chronic steroids)    Negative: Fever > 100.0 F (37.8 C) and bedridden (e.g., nursing home patient, stroke, chronic illness, recovering from surgery)    Negative: HIGH RISK (e.g., age > 64 years, pregnant, HIV+, chronic medical condition) and flu symptoms    Negative: Using nasal washes and pain medicine > 24 hours and sinus pain (lower forehead, cheekbone, or eye) persists    Negative: Fever present > 3 days (72 hours)    Negative: Fever returns after gone for over 24 hours and symptoms worse (or not improved)    Negative: Earache    Negative: Patient wants to be seen    Negative: Patient requests antiviral medicine for influenza and flu symptoms present < 48 hours    Negative: Nasal discharge present > 10 days    Negative: Cough present > 3 weeks    Negative: Probable influenza (fever) with no complications and not HIGH RISK    Protocols used: INFLUENZA - SEASONAL-A-OH

## 2023-07-28 ENCOUNTER — ANCILLARY ORDERS (OUTPATIENT)
Dept: FAMILY MEDICINE | Facility: CLINIC | Age: 38
End: 2023-07-28

## 2023-07-28 ENCOUNTER — ANCILLARY PROCEDURE (OUTPATIENT)
Dept: MAMMOGRAPHY | Facility: CLINIC | Age: 38
End: 2023-07-28
Payer: COMMERCIAL

## 2023-07-28 DIAGNOSIS — Z13.6 SCREENING FOR CARDIOVASCULAR CONDITION: ICD-10-CM

## 2023-07-28 DIAGNOSIS — Z00.00 ROUTINE GENERAL MEDICAL EXAMINATION AT A HEALTH CARE FACILITY: Primary | ICD-10-CM

## 2023-07-28 DIAGNOSIS — Z12.31 ENCOUNTER FOR SCREENING MAMMOGRAM FOR BREAST CANCER: ICD-10-CM

## 2023-07-28 DIAGNOSIS — Z13.1 SCREENING FOR DIABETES MELLITUS: ICD-10-CM

## 2023-07-28 PROCEDURE — 77067 SCR MAMMO BI INCL CAD: CPT | Mod: TC | Performed by: RADIOLOGY

## 2024-03-04 ENCOUNTER — LAB (OUTPATIENT)
Dept: LAB | Facility: CLINIC | Age: 39
End: 2024-03-04
Payer: COMMERCIAL

## 2024-03-04 DIAGNOSIS — E89.0 POST-SURGICAL HYPOTHYROIDISM: ICD-10-CM

## 2024-03-04 DIAGNOSIS — C73 PAPILLARY THYROID CARCINOMA (H): ICD-10-CM

## 2024-03-04 LAB
T4 FREE SERPL-MCNC: 2.21 NG/DL (ref 0.9–1.7)
TSH SERPL DL<=0.005 MIU/L-ACNC: 0.1 UIU/ML (ref 0.3–4.2)

## 2024-03-04 PROCEDURE — 86800 THYROGLOBULIN ANTIBODY: CPT

## 2024-03-04 PROCEDURE — 84439 ASSAY OF FREE THYROXINE: CPT | Performed by: PATHOLOGY

## 2024-03-04 PROCEDURE — 36415 COLL VENOUS BLD VENIPUNCTURE: CPT | Performed by: PATHOLOGY

## 2024-03-04 PROCEDURE — 84443 ASSAY THYROID STIM HORMONE: CPT | Performed by: PATHOLOGY

## 2024-03-07 LAB — SCANNED LAB RESULT: NORMAL

## 2024-03-11 ENCOUNTER — ANCILLARY PROCEDURE (OUTPATIENT)
Dept: ULTRASOUND IMAGING | Facility: CLINIC | Age: 39
End: 2024-03-11
Payer: COMMERCIAL

## 2024-03-11 DIAGNOSIS — E89.0 POST-SURGICAL HYPOTHYROIDISM: ICD-10-CM

## 2024-03-11 DIAGNOSIS — C73 PAPILLARY THYROID CARCINOMA (H): ICD-10-CM

## 2024-03-11 PROCEDURE — 76536 US EXAM OF HEAD AND NECK: CPT | Mod: GC | Performed by: STUDENT IN AN ORGANIZED HEALTH CARE EDUCATION/TRAINING PROGRAM

## 2024-03-27 ENCOUNTER — OFFICE VISIT (OUTPATIENT)
Dept: ENDOCRINOLOGY | Facility: CLINIC | Age: 39
End: 2024-03-27
Payer: COMMERCIAL

## 2024-03-27 VITALS
HEIGHT: 70 IN | OXYGEN SATURATION: 96 % | SYSTOLIC BLOOD PRESSURE: 102 MMHG | HEART RATE: 68 BPM | WEIGHT: 225 LBS | BODY MASS INDEX: 32.21 KG/M2 | DIASTOLIC BLOOD PRESSURE: 67 MMHG

## 2024-03-27 DIAGNOSIS — C73 PAPILLARY THYROID CARCINOMA (H): Primary | ICD-10-CM

## 2024-03-27 DIAGNOSIS — E89.0 POST-SURGICAL HYPOTHYROIDISM: ICD-10-CM

## 2024-03-27 PROCEDURE — 99214 OFFICE O/P EST MOD 30 MIN: CPT

## 2024-03-27 PROCEDURE — G2211 COMPLEX E/M VISIT ADD ON: HCPCS

## 2024-03-27 RX ORDER — LEVOTHYROXINE SODIUM 200 UG/1
200 TABLET ORAL DAILY
Qty: 90 TABLET | Refills: 4 | Status: SHIPPED | OUTPATIENT
Start: 2024-03-27

## 2024-03-27 ASSESSMENT — PAIN SCALES - GENERAL: PAINLEVEL: NO PAIN (0)

## 2024-03-27 NOTE — NURSING NOTE
"Chief Complaint   Patient presents with    Follow Up     RTC     Vital signs:      BP: 102/67 Pulse: 68     SpO2: 96 %     Height: 177.8 cm (5' 10\") Weight: 102.1 kg (225 lb)  Estimated body mass index is 32.28 kg/m  as calculated from the following:    Height as of this encounter: 1.778 m (5' 10\").    Weight as of this encounter: 102.1 kg (225 lb).        "

## 2024-03-27 NOTE — PROGRESS NOTES
Endocrine clinic visit note     Attending Assessment/Plan :     1.  Papillary thyroid carcinoma, 3.2 cm left with ETE, + 5 LN positive. She has been treated with near total Tx and CND, 103 mCi 131.   Yearly labs with Tg, TSH, free T4  Next neck US 2029    2.  Post surgical hypothyroidism.  Treat to target TSH < 0.4.  on LT4  200 mcg/day .    Refilled Rx at present dose    _18  minutes spent on the date of the encounter doing chart review, history and exam, documentation and further activities as noted above.     The Longitudinal plan of care for postsurgical hypothyroidism related to thyroid cancer/thyroid cancer history was addressed during this visit. Due to added complexity of care, we will continue to support Kaitlyn , and the subsequent management of this condition(s) and with the ongoing continuity of care of this condition.    Ghislaine Streeter MD      Chief complaint: HISTORY OF PRESENT ILLNESS    Celeste presents for follow up of papillary thyroid carcinoma, post surgical hypothyroidism.  I last saw her 3/2023. At that time she was on LT4  175 x 7.5/week (which averages 187.5 mcg/day) and we increased to 200 mcg/day.  She continues on LT4 200 mcg/day. She already had labs and US in anticipation of this appt.     The thyroid cancer treatment has been as follows:   2/4/14 Near total thyroidectomy with central lymph node dissection by Dr Sandra Mims at .  3.2 cm PTC left with ETE but no LV invasion.  5/21 LN were positive , larges tumor deposit in LN 0.4 cm.    4/11/14 103 mCi 131I    We have the following labs  1/30/14 25OHD 18, PTH 29, Ca 9.4,   2/4/14 PTH 11  2/6/14 PTH 14  2/20/14: PTH 14, Ca 9.5  4/11/14: Tg 1.8, LOR < 1, .7, PTH 42  Radioiodine 103 mCi  12/4/14: Tg 0.3, LOR < 1, TSH 21.8  8/1/16: Tg < 0.1, TSH 0.44  4/29/17: Tg 0.1, LOR < 1, TSH 0.27  9/14/18 Tg < 0.1, LOR < 1, TPO 32  3/29/19: 25OHD 36  9/6/19 Tg < 0.1, LOR < 1, TSH 0.23, free  T4 2.03  6/24/2020 Tg 0.1 (Access), LOR < 1  (Access), TSH 2.01, free T4 1.73  10/12/2020 Tg < 0.1, LOR < 0.4, TSh 0.21  2021 Tg < 0.1, LOR < 0.4, TSH 0.47, free T4 1.25.   2022 TSH 0.4, free T4 1.44, Tg < 0.1, LOR < 0.4 .  23 Tg < 0.1, LOR < 0.4, TSH 0.4, free T4 1.99- on lT4 200 mcg.day.  3/4/24 Tg < 0.1, LOR < 0.4, TSH 0.1, free T4 2.1       Imaging  13 CT Doc  13 US neck  14 103 mCi 131I  14 131I post therapy TBS: negative  14 4.06 mCi 123I TBS: negative  16 US neck  17 US thyroid  18 US neck   19 thyroid US [facility]-- we do not have the images from this study, only the report is viewable on care everywhere   3/11/24 neck US compared with 2021,  2020  as read by me again today.    Left level 4 #1  0.5 x 0.7 x 0.6 cm  was 0.7 x 0.5 x 0.6 cm  was 0.6 x 0.5 x 0.6 cm     REVIEW OF SYSTEMS  Tired   Negative cardiac, respiratory, GI  Menses monthly   No pregnancy plans       Past Medical History  Past Medical History:   Diagnosis Date    Arthritis     Dry eye     Encounter for IUD insertion 2020    Mirena inserted by Dr Barnett    Encounter for IUD removal 2018-2015 and then again [id number]    History of recurrent miscarriages     x 4    Missed  10/2022    Papillary thyroid carcinoma (H) 2014    Post-surgical hypothyroidism 2014     Past Surgical History:   Procedure Laterality Date    central neck dissection  2014    D & C      2018, 2019, 2020    HC KNEE SCOPE,MED/LAT MENISECTOMY      HC REMOVAL GALLBLADDER      lap     IR THYROID BIOPSY  2014    near total thyroidectomy  2014    [facility] NONSPECIFIC PROCEDURE  2007    lipoma excision from her back    Z REPAIR CRUCIATE LIGAMENT,KNEE      left       Medications  Current Outpatient Medications   Medication Sig Dispense Refill    Bacillus Coagulans-Inulin (ALIGN PREBIOTIC-PROBIOTIC PO)       clobetasol (TEMOVATE) 0.05 % external ointment Apply topically 2 times daily 60 g 11     "levothyroxine (SYNTHROID/LEVOTHROID) 200 MCG tablet Take 1 tablet (200 mcg) by mouth daily 90 tablet 4    loratadine (CLARITIN) 10 MG tablet Take 10 mg by mouth daily      multivitamin w/minerals (THERA-VIT-M) tablet Take 1 tablet by mouth daily      Omega-3 1000 MG capsule Take 1 g by mouth daily       Takes LT4 in the AM      Allergies  Allergies   Allergen Reactions    Sulfa Antibiotics      Rash as a child       Family History  Family History   Problem Relation Age of Onset    Graves' disease Mother         eye diseease    Hypothyroidism Mother         post surgical    Diabetes Father     No Known Problems Sister     No Known Problems Brother     Cerebrovascular Disease Maternal Grandmother     Cardiovascular Maternal Grandmother     Colon Cancer Maternal Grandmother     Cardiovascular Maternal Grandfather     Gastrointestinal Disease Paternal Grandmother         celiac    Cardiovascular Paternal Grandfather     No Known Problems Other     Thyroid Cancer No family hx of        Social History  Social History     Tobacco Use    Smoking status: Never    Smokeless tobacco: Never   Vaping Use    Vaping Use: Never used   Substance Use Topics    Alcohol use: No    Drug use: No     ;     Physical Exam  GENERAL no mask; NAD  /67 (BP Location: Right arm, Patient Position: Sitting, Cuff Size: Adult Large)   Pulse 68   Ht 1.778 m (5' 10\")   Wt 102.1 kg (225 lb)   SpO2 96%   BMI 32.28 kg/m    SKIN: normal color, temperature, texture   HEENT: PER, no scleral icterus, eyelid retraction, stare, lid lag, proptosis or conjunctival injection.  .    NECK: supple.  No visible or palpable neck masses, cervical adenopathy,or goiter.   LUNGS: clear to auscultation bilaterally.   CARDIAC: RRR, S1, S2 without murmurs, rubs or gallops.    BACK: normal spinal contour.    NEURO: Alert, responds appropriately to questions,  moves all extremities, DTRs 0/4, gait normal, trace tremor of the outstretched hand    DATA " REVIEW     Latest Ref Rng 3/9/2023  8:18 AM 5/17/2023  8:15 AM 3/4/2024  8:56 AM   ENDO THYROID LABS-UMP       TSH 0.40 - 4.00 mU/L      TSH 0.30 - 4.20 uIU/mL 1.26  0.40  0.10 (L)    T4 4.5 - 11.7 ug/dL 10.3      FREE T4 0.90 - 1.70 ng/dL  1.99 (H)  2.21 (H)       Legend:  (H) High  (L) Low    EXAMINATION: US HEAD NECK SOFT TISSUE, 3/11/2024 9:22 AM   COMPARISON: Neck ultrasound 9/24/2021  HISTORY: Papillary thyroid carcinoma (H); Post-surgical hypothyroidism  TECHNIQUE: Sonographic imaging performed of the neck to evaluate for  lymph nodes using grey scale and limited Doppler technique.  FINDINGS:  Lymph nodes are measured bilaterally with measurements given in  transverse, AP, and craniocaudal dimensions as follows:  Right:  Level 2: 1.1 x 0.8 x 2.5 cm oval, hypoechoic lymph node with normal  fatty hilum. 0.8 x 0.5 x 2.0 cm ovoid hypoechoic lymph node with  normal fatty hilum. These appear similar compared to prior.  Level 3: No nodes.  Level 4: No nodes.  Level 5: No nodes.  Level 6: No nodes or suspicious soft tissue within the thyroidectomy  bed  Level 7:No nodes.  Left:  Level 2: 1.2 x 0.6 x 1.5 cm ovoid, hypoechoic lymph node with normal  fatty hilum. 1.4 x 0.5 x 2.6 cm ovoid, hypoechoic lymph node with  normal fatty hilum. These appear similar compared to prior.  Level 3: No nodes.  Level 4: Rounded, hypoechoic, 0.7 x 0.5 x 0.6 cm node, unchanged  compared to prior.  Level 5: No nodes.  Level 6: No nodes or suspicious soft tissue within the thyroidectomy  bed.  Level 7:No nodes.                                                             IMPRESSION:  1.  Soft tissue neck ultrasound with lymph node measurements as  described above. No suspicious cervical lymphadenopathy or abnormal  soft tissue within the thyroidectomy bed.  I have personally reviewed the examination and initial interpretation  and I agree with the findings.     LUIS BRAXTON,

## 2024-03-27 NOTE — LETTER
3/27/2024       RE: Kaitlyn Paul  [address] [facility] [address]     Dear Colleague,    Thank you for referring your patient, Kaitlyn Paul, to the [facility]CR[facility] [place] [facility]. Please see a copy of my visit note below.    3/20/2024  PATIENT LAB/IMAGING STATUS : Orders completed / No further action needed       Endocrine clinic visit note     Attending Assessment/Plan :     1.  Papillary thyroid carcinoma, 3.2 cm left with ETE, + 5 LN positive. She has been treated with near total Tx and CND, 103 mCi 131.   Yearly labs with Tg, TSH, free T4  Next neck US 2029    2.  Post surgical hypothyroidism.  Treat to target TSH < 0.4.  on LT4  200 mcg/day .    Refilled Rx at present dose    _18  minutes spent on the date of the encounter doing chart review, history and exam, documentation and further activities as noted above.     The Longitudinal plan of care for postsurgical hypothyroidism related to thyroid cancer/thyroid cancer history was addressed during this visit. Due to added complexity of care, we will continue to support Kaitlyn , and the subsequent management of this condition(s) and with the ongoing continuity of care of this condition.    Ghislaine Streeter MD      Chief complaint: HISTORY OF PRESENT ILLNESS    Celeste presents for follow up of papillary thyroid carcinoma, post surgical hypothyroidism.  I last saw her 3/2023. At that time she was on LT4  175 x 7.5/week (which averages 187.5 mcg/day) and we increased to 200 mcg/day.  She continues on LT4 200 mcg/day. She already had labs and US in anticipation of this appt.     The thyroid cancer treatment has been as follows:   2/4/14 Near total thyroidectomy with central lymph node dissection by Dr Sandra Mims at .  3.2 cm PTC left with ETE but no LV invasion.  5/21 LN were positive , larges tumor deposit in LN 0.4 cm.    4/11/14 103 mCi 131I    We  have the following labs  14 25OHD 18, PTH 29, Ca 9.4,   14 PTH 11  14 PTH 14  14: PTH 14, Ca 9.5  14: Tg 1.8, LOR < 1, .7, PTH 42  Radioiodine 103 mCi  14: Tg 0.3, LOR < 1, TSH 21.8  16: Tg < 0.1, TSH 0.44  17: Tg 0.1, LOR < 1, TSH 0.27  18 Tg < 0.1, LOR < 1, TPO 32  3/29/19: 25OHD 36  19 Tg < 0.1, LOR < 1, TSH 0.23, free  T4 2.03  2020 Tg 0.1 (Access), LOR < 1 (Access), TSH 2.01, free T4 1.73  10/12/2020 Tg < 0.1, LOR < 0.4, TSh 0.21  2021 Tg < 0.1, LOR < 0.4, TSH 0.47, free T4 1.25.   2022 TSH 0.4, free T4 1.44, Tg < 0.1, LOR < 0.4 .  23 Tg < 0.1, LOR < 0.4, TSH 0.4, free T4 1.99- on lT4 200 mcg.day.  3/4/24 Tg < 0.1, LOR < 0.4, TSH 0.1, free T4 2.1       Imaging  13 CT Doc  13 US neck  14 103 mCi 131I  14 131I post therapy TBS: negative  14 4.06 mCi 123I TBS: negative  16 US neck  17 US thyroid  18 US neck   19 thyroid US [facility]-- we do not have the images from this study, only the report is viewable on care everywhere   3/11/24 neck US compared with 2021,  2020  as read by me again today.    Left level 4 #1  0.5 x 0.7 x 0.6 cm  was 0.7 x 0.5 x 0.6 cm  was 0.6 x 0.5 x 0.6 cm     REVIEW OF SYSTEMS  Tired   Negative cardiac, respiratory, GI  Menses monthly   No pregnancy plans       Past Medical History  Past Medical History:   Diagnosis Date    Arthritis     Dry eye     Encounter for IUD insertion 2020    Mirena inserted by Dr Barnett    Encounter for IUD removal 2018    [id number] and then again [id number]    History of recurrent miscarriages     x 4    Missed  10/2022    Papillary thyroid carcinoma (H) 2014    Post-surgical hypothyroidism 2014     Past Surgical History:   Procedure Laterality Date    central neck dissection  2014    D & C      2018, 2019, 2020    HC KNEE SCOPE,MED/LAT MENISECTOMY      HC REMOVAL GALLBLADDER      lap     IR  "THYROID BIOPSY  1/16/2014    near total thyroidectomy  02/04/2014    [facility] NONSPECIFIC PROCEDURE  2007    lipoma excision from her back    Z REPAIR CRUCIATE LIGAMENT,KNEE  1999    left       Medications  Current Outpatient Medications   Medication Sig Dispense Refill    Bacillus Coagulans-Inulin (ALIGN PREBIOTIC-PROBIOTIC PO)       clobetasol (TEMOVATE) 0.05 % external ointment Apply topically 2 times daily 60 g 11    levothyroxine (SYNTHROID/LEVOTHROID) 200 MCG tablet Take 1 tablet (200 mcg) by mouth daily 90 tablet 4    loratadine (CLARITIN) 10 MG tablet Take 10 mg by mouth daily      multivitamin w/minerals (THERA-VIT-M) tablet Take 1 tablet by mouth daily      Omega-3 1000 MG capsule Take 1 g by mouth daily       Takes LT4 in the AM      Allergies  Allergies   Allergen Reactions    Sulfa Antibiotics      Rash as a child       Family History  Family History   Problem Relation Age of Onset    Graves' disease Mother         eye diseease    Hypothyroidism Mother         post surgical    Diabetes Father     No Known Problems Sister     No Known Problems Brother     Cerebrovascular Disease Maternal Grandmother     Cardiovascular Maternal Grandmother     Colon Cancer Maternal Grandmother     Cardiovascular Maternal Grandfather     Gastrointestinal Disease Paternal Grandmother         celiac    Cardiovascular Paternal Grandfather     No Known Problems Other     Thyroid Cancer No family hx of        Social History  Social History     Tobacco Use    Smoking status: Never    Smokeless tobacco: Never   Vaping Use    Vaping Use: Never used   Substance Use Topics    Alcohol use: No    Drug use: No     ;     Physical Exam  GENERAL no mask; NAD  /67 (BP Location: Right arm, Patient Position: Sitting, Cuff Size: Adult Large)   Pulse 68   Ht 1.778 m (5' 10\")   Wt 102.1 kg (225 lb)   SpO2 96%   BMI 32.28 kg/m    SKIN: normal color, temperature, texture   HEENT: PER, no scleral icterus, eyelid retraction, stare, " lid lag, proptosis or conjunctival injection.  .    NECK: supple.  No visible or palpable neck masses, cervical adenopathy,or goiter.   LUNGS: clear to auscultation bilaterally.   CARDIAC: RRR, S1, S2 without murmurs, rubs or gallops.    BACK: normal spinal contour.    NEURO: Alert, responds appropriately to questions,  moves all extremities, DTRs 0/4, gait normal, trace tremor of the outstretched hand    DATA REVIEW     Latest Ref Rng 3/9/2023  8:18 AM 5/17/2023  8:15 AM 3/4/2024  8:56 AM   ENDO THYROID LABS-UMP       TSH 0.40 - 4.00 mU/L      TSH 0.30 - 4.20 uIU/mL 1.26  0.40  0.10 (L)    T4 4.5 - 11.7 ug/dL 10.3      FREE T4 0.90 - 1.70 ng/dL  1.99 (H)  2.21 (H)       Legend:  (H) High  (L) Low    EXAMINATION: US HEAD NECK SOFT TISSUE, 3/11/2024 9:22 AM   COMPARISON: Neck ultrasound 9/24/2021  HISTORY: Papillary thyroid carcinoma (H); Post-surgical hypothyroidism  TECHNIQUE: Sonographic imaging performed of the neck to evaluate for  lymph nodes using grey scale and limited Doppler technique.  FINDINGS:  Lymph nodes are measured bilaterally with measurements given in  transverse, AP, and craniocaudal dimensions as follows:  Right:  Level 2: 1.1 x 0.8 x 2.5 cm oval, hypoechoic lymph node with normal  fatty hilum. 0.8 x 0.5 x 2.0 cm ovoid hypoechoic lymph node with  normal fatty hilum. These appear similar compared to prior.  Level 3: No nodes.  Level 4: No nodes.  Level 5: No nodes.  Level 6: No nodes or suspicious soft tissue within the thyroidectomy  bed  Level 7:No nodes.  Left:  Level 2: 1.2 x 0.6 x 1.5 cm ovoid, hypoechoic lymph node with normal  fatty hilum. 1.4 x 0.5 x 2.6 cm ovoid, hypoechoic lymph node with  normal fatty hilum. These appear similar compared to prior.  Level 3: No nodes.  Level 4: Rounded, hypoechoic, 0.7 x 0.5 x 0.6 cm node, unchanged  compared to prior.  Level 5: No nodes.  Level 6: No nodes or suspicious soft tissue within the thyroidectomy  bed.  Level 7:No nodes.                                                              IMPRESSION:  1.  Soft tissue neck ultrasound with lymph node measurements as  described above. No suspicious cervical lymphadenopathy or abnormal  soft tissue within the thyroidectomy bed.  I have personally reviewed the examination and initial interpretation  and I agree with the findings.     DO Ghislaine HEBERT MD

## 2024-03-27 NOTE — PATIENT INSTRUCTIONS
See me approximately yearly with labs about 2-3 weeks prior    Neck neck US 2029    Re-ordered levothyroxine 200 mcg/day, # 90, 4 refills

## 2024-03-28 ENCOUNTER — TELEPHONE (OUTPATIENT)
Dept: ORTHOPEDICS | Facility: CLINIC | Age: 39
End: 2024-03-28
Payer: COMMERCIAL

## 2024-03-28 NOTE — TELEPHONE ENCOUNTER
LVM letting her know she can schedule an appointment for injections with Dr. Archie Buchanan who she has seen before in the past. Scheduling number given.

## 2024-03-28 NOTE — TELEPHONE ENCOUNTER
Other: Patient is wondering if Dr Pena can recommend a diff't provider for her knee injections. She can't do this morning w/him and doesn't want to wait until his next opening.      Could we send this information to you in [employer] or would you prefer to receive a phone call?:   Patient would prefer a phone call   Okay to leave a detailed message?: Yes at Cell number on file:    Telephone Information:   Mobile [phone]

## 2024-04-09 ENCOUNTER — PATIENT OUTREACH (OUTPATIENT)
Dept: CARE COORDINATION | Facility: CLINIC | Age: 39
End: 2024-04-09
Payer: COMMERCIAL

## 2024-04-12 NOTE — TELEPHONE ENCOUNTER
[facility] Call Center    Phone Message    May a detailed message be left on voicemail: yes     Reason for Call: Medication Refill Request    Has the patient contacted the pharmacy for the refill? Yes    Name of medication being requested: levothyroxine (SYNTHROID/LEVOTHROID) 175 MCG tablet    Provider who prescribed the medication: Dr. Streeter     Pharmacy: [id number] - [place], [place] - [address] [place] [facility] [place] [address] & [facility]      Date medication is needed: ASAP patient is completley out of medications (does not have thyroid -urgent need)           Action Taken: Other: ENDO    Travel Screening: Not Applicable                                                                       Quality 226: Preventive Care And Screening: Tobacco Use: Screening And Cessation Intervention: Patient screened for tobacco use and is an ex/non-smoker Detail Level: Detailed

## 2024-04-20 DIAGNOSIS — E89.0 POST-SURGICAL HYPOTHYROIDISM: ICD-10-CM

## 2024-04-20 DIAGNOSIS — C73 PAPILLARY THYROID CARCINOMA (H): ICD-10-CM

## 2024-04-22 ENCOUNTER — OFFICE VISIT (OUTPATIENT)
Dept: ORTHOPEDICS | Facility: CLINIC | Age: 39
End: 2024-04-22
Payer: COMMERCIAL

## 2024-04-22 VITALS — WEIGHT: 225 LBS | BODY MASS INDEX: 32.21 KG/M2 | HEIGHT: 70 IN

## 2024-04-22 DIAGNOSIS — G89.29 CHRONIC PAIN OF RIGHT KNEE: ICD-10-CM

## 2024-04-22 DIAGNOSIS — G89.29 CHRONIC PAIN OF LEFT KNEE: Primary | ICD-10-CM

## 2024-04-22 DIAGNOSIS — M25.561 CHRONIC PAIN OF RIGHT KNEE: ICD-10-CM

## 2024-04-22 DIAGNOSIS — M25.562 CHRONIC PAIN OF LEFT KNEE: Primary | ICD-10-CM

## 2024-04-22 PROCEDURE — 20610 DRAIN/INJ JOINT/BURSA W/O US: CPT | Mod: 50 | Performed by: ORTHOPAEDIC SURGERY

## 2024-04-22 RX ORDER — LIDOCAINE HYDROCHLORIDE 5 MG/ML
8 INJECTION, SOLUTION INFILTRATION; INTRAVENOUS
Status: SHIPPED | OUTPATIENT
Start: 2024-04-22

## 2024-04-22 RX ORDER — TRIAMCINOLONE ACETONIDE 40 MG/ML
40 INJECTION, SUSPENSION INTRA-ARTICULAR; INTRAMUSCULAR
Status: SHIPPED | OUTPATIENT
Start: 2024-04-22

## 2024-04-22 RX ADMIN — LIDOCAINE HYDROCHLORIDE 8 ML: 5 INJECTION, SOLUTION INFILTRATION; INTRAVENOUS at 08:00

## 2024-04-22 RX ADMIN — TRIAMCINOLONE ACETONIDE 40 MG: 40 INJECTION, SUSPENSION INTRA-ARTICULAR; INTRAMUSCULAR at 08:00

## 2024-04-22 NOTE — PROGRESS NOTES
Large Joint Injection: bilateral knee    Date/Time: 4/22/2024 8:00 AM    Performed by: Tye Pena MD  Authorized by: Tye Pena MD    Indications:  Pain  Needle Size:  21 G  Guidance: landmark guided    Approach:  Anterolateral  Location:  Knee  Laterality:  Bilateral      Medications (Right):  40 mg triamcinolone 40 MG/ML; 8 mL lidocaine (PF) 0.5 %  Medications (Left):  40 mg triamcinolone 40 MG/ML; 8 mL lidocaine (PF) 0.5 %  Outcome:  Tolerated well, no immediate complications  Procedure discussed: discussed risks, benefits, and alternatives    Consent Given by:  Patient  Timeout: timeout called immediately prior to procedure    Prep: patient was prepped and draped in usual sterile fashion

## 2024-04-22 NOTE — LETTER
4/22/2024         RE: Kaitlyn Paul  [address] [facility] [address]        Dear Colleague,    Thank you for referring your patient, Kaitlyn Paul, to the [facility] [place]. Please see a copy of my visit note below.    Kaitlyn Paul returns to my clinic today for interval follow-up she is a very pleasant 38-year-old woman.  I saw her in January 2023 and did a corticosteroid injection to her knee.  She is approximately 10 years status post right knee ACL reconstruction 20 years status post left knee ACL reconstruction.  She has had subsequent arthroscopic chondral related surgery in the past.  Her working diagnosis been of patellofemoral arthritis.    She comes in today interested in a repeat injection.    Examination of her right knee today shows well-preserved motion.  Lachman 0.  No pivot shift.  Stable to varus valgus stress testing.  Examination of her left knee today shows Lachman 0.  No pivot shift.  Stable to proceed with stress testing.    Images obtained in January 2023 independently reviewed by myself shows her to be status post ACL reconstruction with tunnels and hardware in good position.  No fractures dislocations of the early patellofemoral chondrosis is noted there is no full-thickness cartilage loss    Plan: Long discussion today with the patient.  Reviewed the diagnosis potential treatment options.  I offered her corticosteroid injections to both knees.  She accepted.    After written informed consent obtained and signed, after sufficient prepping and sterile technique, 40 mg of kenalog and , 8 cc of 1% lidocaine were injected without complication into the right knee. The patient tolerated the injection well and a sterile dressing was applied.     After written informed consent obtained and signed, after sufficient prepping and sterile technique, 40 mg of kenalog and , 8 cc of 1% lidocaine were injected without complication into the  left knee. The patient tolerated the injection well and a sterile dressing was applied.    Going forward she can have a repeat injection as needed.  Still if she is also failing to see lasting improvement I would consider working up her knee further with MRI imaging.  She voiced understanding this.            Large Joint Injection: bilateral knee    Date/Time: 4/22/2024 8:00 AM    Performed by: Tye Pena MD  Authorized by: Tye Pena MD    Indications:  Pain  Needle Size:  21 G  Guidance: landmark guided    Approach:  Anterolateral  Location:  Knee  Laterality:  Bilateral      Medications (Right):  40 mg triamcinolone 40 MG/ML; 8 mL lidocaine (PF) 0.5 %  Medications (Left):  40 mg triamcinolone 40 MG/ML; 8 mL lidocaine (PF) 0.5 %  Outcome:  Tolerated well, no immediate complications  Procedure discussed: discussed risks, benefits, and alternatives    Consent Given by:  Patient  Timeout: timeout called immediately prior to procedure    Prep: patient was prepped and draped in usual sterile fashion          Again, thank you for allowing me to participate in the care of your patient.        Sincerely,        Tye Pena MD

## 2024-04-22 NOTE — PROGRESS NOTES
Kaitlyn Paul returns to my clinic today for interval follow-up she is a very pleasant 38-year-old woman.  I saw her in January 2023 and did a corticosteroid injection to her knee.  She is approximately 10 years status post right knee ACL reconstruction 20 years status post left knee ACL reconstruction.  She has had subsequent arthroscopic chondral related surgery in the past.  Her working diagnosis been of patellofemoral arthritis.    She comes in today interested in a repeat injection.    Examination of her right knee today shows well-preserved motion.  Lachman 0.  No pivot shift.  Stable to varus valgus stress testing.  Examination of her left knee today shows Lachman 0.  No pivot shift.  Stable to proceed with stress testing.    Images obtained in January 2023 independently reviewed by myself shows her to be status post ACL reconstruction with tunnels and hardware in good position.  No fractures dislocations of the early patellofemoral chondrosis is noted there is no full-thickness cartilage loss    Plan: Long discussion today with the patient.  Reviewed the diagnosis potential treatment options.  I offered her corticosteroid injections to both knees.  She accepted.    After written informed consent obtained and signed, after sufficient prepping and sterile technique, 40 mg of kenalog and , 8 cc of 1% lidocaine were injected without complication into the right knee. The patient tolerated the injection well and a sterile dressing was applied.     After written informed consent obtained and signed, after sufficient prepping and sterile technique, 40 mg of kenalog and , 8 cc of 1% lidocaine were injected without complication into the left knee. The patient tolerated the injection well and a sterile dressing was applied.    Going forward she can have a repeat injection as needed.  Still if she is also failing to see lasting improvement I would consider working up her knee further with MRI imaging.  She  voiced understanding this.

## 2024-04-22 NOTE — NURSING NOTE
[address] [address]  Dept: [phone]  ______________________________________________________________________________    Patient: Kaitlyn Paul   : 1985   MRN: [id number]   2024    INVASIVE PROCEDURE SAFETY CHECKLIST    Date: 24   Procedure: Bilateral knee kenalog injections  Patient Name: Kaitlyn Paul  MRN: [id number]  YOB: 1985    Action: Complete sections as appropriate. Any discrepancy results in a HARD COPY until resolved.     PRE PROCEDURE:  Patient ID verified with 2 identifiers (name and  or MRN): Yes  Procedure and site verified with patient/designee (when able): Yes  Accurate consent documentation in medical record: Yes  H&P (or appropriate assessment) documented in medical record: Yes  H&P must be up to 20 days prior to procedure and updates within 24 hours of procedure as applicable: NA  Relevant diagnostic and radiology test results appropriately labeled and displayed as applicable: Yes  Procedure site(s) marked with provider initials: NA    TIMEOUT:  Time-Out performed immediately prior to starting procedure, including verbal and active participation of all team members addressing the following:Yes  * Correct patient identify  * Confirmed that the correct side and site are marked  * An accurate procedure consent form  * Agreement on the procedure to be done  * Correct patient position  * Relevant images and results are properly labeled and appropriately displayed  * The need to administer antibiotics or fluids for irrigation purposes during the procedure as applicable   * Safety precautions based on patient history or medication use    DURING PROCEDURE: Verification of correct person, site, and procedures any time the responsibility for care of the patient is transferred to another member of the care team.       Prior to injection, verified patient identity using patient's name  and date of birth.  Due to injection administration, patient instructed to remain in clinic for 15 minutes  afterwards, and to report any adverse reaction to me immediately.    Joint injection was performed.      Drug Amount Wasted:  Yes: 34 mg/ml   Vial/Syringe: Single dose vial  Expiration Date:  11/01/2024      Colette Muller, ATC  April 22, 2024

## 2024-04-23 ENCOUNTER — PATIENT OUTREACH (OUTPATIENT)
Dept: CARE COORDINATION | Facility: CLINIC | Age: 39
End: 2024-04-23
Payer: COMMERCIAL

## 2024-04-24 ENCOUNTER — MYC REFILL (OUTPATIENT)
Dept: ENDOCRINOLOGY | Facility: CLINIC | Age: 39
End: 2024-04-24
Payer: COMMERCIAL

## 2024-04-24 DIAGNOSIS — E89.0 POST-SURGICAL HYPOTHYROIDISM: ICD-10-CM

## 2024-04-24 DIAGNOSIS — C73 PAPILLARY THYROID CARCINOMA (H): ICD-10-CM

## 2024-04-24 RX ORDER — LEVOTHYROXINE SODIUM 200 UG/1
200 TABLET ORAL DAILY
Qty: 90 TABLET | Refills: 4 | OUTPATIENT
Start: 2024-04-24

## 2024-04-25 ENCOUNTER — TELEPHONE (OUTPATIENT)
Dept: ENDOCRINOLOGY | Facility: CLINIC | Age: 39
End: 2024-04-25
Payer: COMMERCIAL

## 2024-04-25 RX ORDER — LEVOTHYROXINE SODIUM 200 UG/1
200 TABLET ORAL DAILY
Qty: 90 TABLET | Refills: 4 | OUTPATIENT
Start: 2024-04-25

## 2024-04-25 NOTE — TELEPHONE ENCOUNTER
[facility] Call Center    Phone Message    May a detailed message be left on voicemail: yes     Reason for Call: Medication Refill Request    Has the patient contacted the pharmacy for the refill? Yes   Name of medication being requested:    levothyroxine (SYNTHROID/LEVOTHROID) 200 MCG tablet     Provider who prescribed the medication: Ghislaine Streeter MD   Pharmacy: [id number] [address] AT [address]   Date medication is needed: Patient has requested it via [employer] and gone through pharmacy patient is just needing this sent over for refills.     Action Taken: Message routed to:  [facility] ([facility] ENDO    Travel Screening: Not Applicable

## 2024-04-25 NOTE — TELEPHONE ENCOUNTER
RX available; pharm called they are processing rf   levothyroxine (SYNTHROID/LEVOTHROID) 200 MCG tablet   90 tablet 4 3/27/2024 -- No  Sig - Route: Take 1 tablet (200 mcg) by mouth daily - Oral  E-Prescribing Status: Receipt confirmed by pharmacy (3/27/2024  1:09 PM CDT)    Provider who prescribed the medication: Ghislaine Streeter MD

## 2024-06-29 ENCOUNTER — PATIENT OUTREACH (OUTPATIENT)
Dept: CARE COORDINATION | Facility: CLINIC | Age: 39
End: 2024-06-29
Payer: COMMERCIAL

## 2024-06-29 ENCOUNTER — HEALTH MAINTENANCE LETTER (OUTPATIENT)
Age: 39
End: 2024-06-29

## 2024-07-30 ENCOUNTER — OFFICE VISIT (OUTPATIENT)
Dept: OBGYN | Facility: CLINIC | Age: 39
End: 2024-07-30
Payer: COMMERCIAL

## 2024-07-30 ENCOUNTER — ANCILLARY PROCEDURE (OUTPATIENT)
Dept: MAMMOGRAPHY | Facility: CLINIC | Age: 39
End: 2024-07-30
Payer: COMMERCIAL

## 2024-07-30 VITALS
BODY MASS INDEX: 33.33 KG/M2 | SYSTOLIC BLOOD PRESSURE: 120 MMHG | DIASTOLIC BLOOD PRESSURE: 68 MMHG | HEIGHT: 69 IN | WEIGHT: 225 LBS

## 2024-07-30 DIAGNOSIS — Z01.419 ENCOUNTER FOR GYNECOLOGICAL EXAMINATION WITHOUT ABNORMAL FINDING: Primary | ICD-10-CM

## 2024-07-30 DIAGNOSIS — Z12.31 VISIT FOR SCREENING MAMMOGRAM: ICD-10-CM

## 2024-07-30 PROCEDURE — 77067 SCR MAMMO BI INCL CAD: CPT | Mod: TC | Performed by: RADIOLOGY

## 2024-07-30 PROCEDURE — 99396 PREV VISIT EST AGE 40-64: CPT | Performed by: OBSTETRICS & GYNECOLOGY

## 2024-07-30 PROCEDURE — 77063 BREAST TOMOSYNTHESIS BI: CPT | Mod: TC | Performed by: RADIOLOGY

## 2024-07-30 ASSESSMENT — PATIENT HEALTH QUESTIONNAIRE - PHQ9
5. POOR APPETITE OR OVEREATING: NOT AT ALL
SUM OF ALL RESPONSES TO PHQ QUESTIONS 1-9: 0

## 2024-07-30 ASSESSMENT — ANXIETY QUESTIONNAIRES
2. NOT BEING ABLE TO STOP OR CONTROL WORRYING: NOT AT ALL
IF YOU CHECKED OFF ANY PROBLEMS ON THIS QUESTIONNAIRE, HOW DIFFICULT HAVE THESE PROBLEMS MADE IT FOR YOU TO DO YOUR WORK, TAKE CARE OF THINGS AT HOME, OR GET ALONG WITH OTHER PEOPLE: NOT DIFFICULT AT ALL
5. BEING SO RESTLESS THAT IT IS HARD TO SIT STILL: NOT AT ALL
3. WORRYING TOO MUCH ABOUT DIFFERENT THINGS: NOT AT ALL
6. BECOMING EASILY ANNOYED OR IRRITABLE: NOT AT ALL
GAD7 TOTAL SCORE: 0
1. FEELING NERVOUS, ANXIOUS, OR ON EDGE: NOT AT ALL
7. FEELING AFRAID AS IF SOMETHING AWFUL MIGHT HAPPEN: NOT AT ALL
GAD7 TOTAL SCORE: 0

## 2024-07-30 NOTE — PATIENT INSTRUCTIONS
Follow up with your primary care provider and endocrinologist for your other medical problems.  Continue self breast exam.  Increase physical activity and exercise.  Usual safety and preventative measures counseling done.  BMI >25  Weight loss encouraged.  Last pap smear (2020) was normal and negative for the DNA of high risk HPV subtypes.  No pap was obtained this year.  This was discussed with the patient and she agrees with the plan.

## 2024-07-30 NOTE — PROGRESS NOTES
"Kaitlyn is a 39 year old  female who presents for annual exam.     Besides routine health maintenance,  she would like to discuss menses in May and .night sweats    HPI:  Here today for yearly exam --doing well.  Regular, monthly menses x 5-7d.  Usually heavier bleeding in first 2 days and then tapers quickly.  No intermenstrual bleeding/spotting but did have 2 periods in May.  Back to normal in  and July.  +night sweats.  No daytime symptoms.  +SA --no issues.  Denies bowel/bladder issues.  Occ TWIL[employer] with cough/laugh/sneeze    ; works as  for [employer] (2022); son will be in 3rd grade  -staying active but not much formal exercise with busy summer schedule and work  +mammo today; +SBE --no issues; no personal or family hx breast dz  PCP -Joseline Nation --has not seen recently but had bloodwork last spring; also sees endocrinology for thyroid issues      GYNECOLOGIC HISTORY:    Patient's last menstrual period was 2024 (exact date).    Regular menses? yes  Menses every 30 days.  Length of menses: 5-7 days    Her current contraception method is: vasectomy.  She  reports that she has never smoked. She has never used smokeless tobacco.    Patient is sexually active.  STD testing offered?  Declined  Last PHQ-9 score on record =       2024    10:02 AM   PHQ-9 SCORE   PHQ-9 Total Score 0     Last GAD7 score on record =       2024    10:02 AM   TUCKER-7 SCORE   Total Score 0     Alcohol Score = 1    HEALTH MAINTENANCE:  Cholesterol:   Cholesterol   Date Value Ref Range Status   2023 154 <200 mg/dL Final   2020 152 <200 mg/dL Final   Last Mammo: One year ago, Result: Normal, Next Mammo: Today   Pap: (No results found for: \"GYNINTERP\", \"PAP\" )    Colonoscopy:  NA, Result: Not applicable, Next Colonoscopy: NA years.  Dexa:  NA    Health maintenance updated:  yes    HISTORY:  OB History    Para Term  AB Living   8 1 1 0 7 1   SAB IAB Ectopic " Multiple Live Births   7 0 0 0 1      # Outcome Date GA Lbr Dharmesh/2nd Weight Sex Type Anes PTL Lv   8 SAB 10/17/22     SAB      7 SAB 10/29/21 9w0d          6 2018           5 Term 2016    M    SHAISTA      Name: Anthony   4 SAB            3 SAB            2 SAB            1 SAB                Patient Active Problem List   Diagnosis    Closed displaced fracture of proximal phalanx of lesser toe of left foot with routine healing, subsequent encounter    Post-surgical hypothyroidism    History of thyroid cancer    Papillary thyroid carcinoma (H)     Past Surgical History:   Procedure Laterality Date    central neck dissection  2014    D & C      2018, 2019, 2020    HC KNEE SCOPE,MED/LAT MENISECTOMY      HC REMOVAL GALLBLADDER      lap     IR THYROID BIOPSY  2014    near total thyroidectomy  2014    ZZC NONSPECIFIC PROCEDURE  2007    lipoma excision from her back    ZZ REPAIR CRUCIATE LIGAMENT,KNEE  1999    left      Social History     Tobacco Use    Smoking status: Never    Smokeless tobacco: Never   Substance Use Topics    Alcohol use: No      Problem (# of Occurrences) Relation (Name,Age of Onset)    Cardiovascular (3) Maternal Grandmother (Doris), Maternal Grandfather, Paternal Grandfather    Diabetes (1) Father (Johnny)    Gastrointestinal Disease (1) Paternal Grandmother: celiac    Cerebrovascular Disease (1) Maternal Grandmother (Doris)    Graves' disease (1) Mother (Leticia): eye diseease    Colon Cancer (1) Maternal Grandmother (Doris)    Hypothyroidism (1) Mother (Leticia): post surgical    No Known Problems (3) Sister, Brother, Other           Negative family history of: Thyroid Cancer              Current Outpatient Medications   Medication Sig Dispense Refill    Bacillus Coagulans-Inulin (ALIGN PREBIOTIC-PROBIOTIC PO)       clobetasol (TEMOVATE) 0.05 % external ointment Apply topically 2 times daily 60 g 11    levothyroxine (SYNTHROID/LEVOTHROID) 200 MCG tablet Take 1 tablet  "(200 mcg) by mouth daily 90 tablet 4    loratadine (CLARITIN) 10 MG tablet Take 10 mg by mouth daily      multivitamin w/minerals (THERA-VIT-M) tablet Take 1 tablet by mouth daily      Omega-3 1000 MG capsule Take 1 g by mouth daily       Current Facility-Administered Medications   Medication Dose Route Frequency Provider Last Rate Last Admin    lidocaine (PF) 0.5 % injection SOLN 8 mL  8 mL      8 mL at 04/22/24 0800    lidocaine (PF) 0.5 % injection SOLN 8 mL  8 mL      8 mL at 04/22/24 0800    triamcinolone (KENALOG-40) injection 40 mg  40 mg      40 mg at 04/22/24 0800    triamcinolone (KENALOG-40) injection 40 mg  40 mg      40 mg at 04/22/24 0800     Allergies   Allergen Reactions    Sulfa Antibiotics      Rash as a child       Past medical, surgical, social and family histories were reviewed and updated in EPIC.    ROS:   12 point review of systems negative other than symptoms noted below or in the HPI.  Genitourinary: Night Sweats  No urinary frequency or dysuria, bladder or kidney problems, Normal menstrual cycles    EXAM:  /68   Ht 1.753 m (5' 9\")   Wt 102.1 kg (225 lb)   LMP 07/24/2024 (Exact Date)   BMI 33.23 kg/m     BMI: Body mass index is 33.23 kg/m .    PHYSICAL EXAM:  Constitutional:   Appearance: Well nourished, well developed, alert, in no acute distress  Neck:  Lymph Nodes:  No lymphadenopathy present    Thyroid:  Gland size normal, nontender, no nodules or masses present  on palpation  Chest:  Respiratory Effort:  Breathing unlabored  Cardiovascular:    Heart: Auscultation:  Regular rate, normal rhythm, no murmurs present  Breasts: Palpation of Breasts and Axillae:  No masses present on palpation, no breast tenderness., Axillary Lymph Nodes:  No lymphadenopathy present., and No nodularity, asymmetry or nipple discharge bilaterally.  Gastrointestinal:   Abdominal Examination:  Abdomen nontender to palpation, tone normal without rigidity or guarding, no masses present, umbilicus without " lesions   Liver and Spleen:  No hepatomegaly present, liver nontender to palpation    Hernias:  No hernias present  Lymphatic: Lymph Nodes:  No other lymphadenopathy present  Skin:  General Inspection:  No rashes present, no lesions present, no areas of  discoloration  Neurologic:    Mental Status:  Oriented X3.  Normal strength and tone, sensory exam                grossly normal, mentation intact and speech normal.    Psychiatric:   Mentation appears normal and affect normal/bright.         Pelvic Exam:  External Genitalia:     Normal appearance for age, no discharge present, no tenderness present, no inflammatory lesions present, color normal  Vagina:     Normal vaginal vault without central or paravaginal defects, no discharge present, no inflammatory lesions present, no masses present  Bladder:     Nontender to palpation  Urethra:   Urethral Body:  Urethra palpation normal, urethra structural support normal   Urethral Meatus:  No erythema or lesions present  Cervix:     Appearance healthy, no lesions present, nontender to palpation, no bleeding present  Uterus:     Uterus: firm, normal sized and nontender, midplane in position.   Adnexa:     No adnexal tenderness present, no adnexal masses present  Perineum:     Perineum within normal limits, no evidence of trauma, no rashes or skin lesions present  Anus:     Anus within normal limits, no hemorrhoids present  Inguinal Lymph Nodes:     No lymphadenopathy present  Pubic Hair:     Normal pubic hair distribution for age  Genitalia and Groin:     No rashes present, no lesions present, no areas of discoloration, no masses present    COUNSELING:   Reviewed preventive health counseling, as reflected in patient instructions  Special attention given to:        Regular exercise       Healthy diet/nutrition       (Michelle)menopause management    BMI: Body mass index is 33.23 kg/m .  Weight management plan: Discussed healthy diet and exercise guidelines Patient was referred to  their PCP to discuss a diet and exercise plan.    ASSESSMENT:  39 year old female with satisfactory annual exam.    ICD-10-CM    1. Encounter for gynecological examination without abnormal finding  Z01.419           PLAN:  Patient Instructions   Follow up with your primary care provider and endocrinologist for your other medical problems.  Continue self breast exam.  Increase physical activity and exercise.  Usual safety and preventative measures counseling done.  BMI >25  Weight loss encouraged.  Last pap smear (2020) was normal and negative for the DNA of high risk HPV subtypes.  No pap was obtained this year.  This was discussed with the patient and she agrees with the plan.       Michaela Barnett MD

## 2025-03-26 ENCOUNTER — LAB (OUTPATIENT)
Dept: LAB | Facility: CLINIC | Age: 40
End: 2025-03-26
Payer: COMMERCIAL

## 2025-03-26 DIAGNOSIS — E89.0 POST-SURGICAL HYPOTHYROIDISM: ICD-10-CM

## 2025-03-26 DIAGNOSIS — C73 PAPILLARY THYROID CARCINOMA (H): ICD-10-CM

## 2025-03-26 LAB
T4 FREE SERPL-MCNC: 1.81 NG/DL (ref 0.9–1.7)
TSH SERPL DL<=0.005 MIU/L-ACNC: 0.24 UIU/ML (ref 0.3–4.2)

## 2025-03-26 PROCEDURE — 84439 ASSAY OF FREE THYROXINE: CPT | Performed by: PATHOLOGY

## 2025-03-26 PROCEDURE — 84443 ASSAY THYROID STIM HORMONE: CPT | Performed by: PATHOLOGY

## 2025-03-26 PROCEDURE — 36415 COLL VENOUS BLD VENIPUNCTURE: CPT | Performed by: PATHOLOGY

## 2025-03-26 PROCEDURE — 84432 ASSAY OF THYROGLOBULIN: CPT

## 2025-04-01 LAB — SCANNED LAB RESULT: NORMAL

## 2025-04-16 ENCOUNTER — OFFICE VISIT (OUTPATIENT)
Dept: ENDOCRINOLOGY | Facility: CLINIC | Age: 40
End: 2025-04-16
Payer: COMMERCIAL

## 2025-04-16 VITALS
HEIGHT: 70 IN | OXYGEN SATURATION: 98 % | WEIGHT: 229 LBS | BODY MASS INDEX: 32.78 KG/M2 | HEART RATE: 72 BPM | SYSTOLIC BLOOD PRESSURE: 107 MMHG | DIASTOLIC BLOOD PRESSURE: 70 MMHG

## 2025-04-16 DIAGNOSIS — E89.0 POST-SURGICAL HYPOTHYROIDISM: ICD-10-CM

## 2025-04-16 DIAGNOSIS — C73 PAPILLARY THYROID CARCINOMA (H): Primary | ICD-10-CM

## 2025-04-16 PROCEDURE — 1126F AMNT PAIN NOTED NONE PRSNT: CPT

## 2025-04-16 PROCEDURE — 99214 OFFICE O/P EST MOD 30 MIN: CPT | Mod: GC

## 2025-04-16 PROCEDURE — G2211 COMPLEX E/M VISIT ADD ON: HCPCS

## 2025-04-16 PROCEDURE — 3078F DIAST BP <80 MM HG: CPT

## 2025-04-16 PROCEDURE — 3074F SYST BP LT 130 MM HG: CPT

## 2025-04-16 RX ORDER — LEVOTHYROXINE SODIUM 200 UG/1
200 TABLET ORAL DAILY
Qty: 90 TABLET | Refills: 4 | Status: SHIPPED | OUTPATIENT
Start: 2025-04-16

## 2025-04-16 ASSESSMENT — PAIN SCALES - GENERAL: PAINLEVEL_OUTOF10: NO PAIN (0)

## 2025-04-16 NOTE — PROGRESS NOTES
Endocrine Progress Note  Patient: Kaitlyn Paul   MRN: [id number]  Date of Service: 04/16/2025         Chief complaint/HPI:     39 year old female with history of classic variant papillary thyroid carcinoma (TNM stage I :pT3 pN1): 3.2 cm , unifocal, left lobe, focal extrathyroidal extension, negative tumor margins, 5 out of 21 lymph nodes positive, 0.4 cm, no FRANKY.  Treated with a total thyroidectomy central neck dissection in February 2014 followed by 103 mCi CHIN on April 11, 2014 at [facility].  Patient has done well with no evidence of structural or biochemical recurrence of disease.  Patient continues to be on TSH suppressive therapy with treat to target TSH less than 0.4 (currently on levothyroxine 200 mcg daily).          Interval History:       Last clinic visit: Progress Notes by Ghislaine Streeter MD (03/27/2024 1:00 PM)   Last soft tissue neck US: US head neck soft tissue (03/11/2024 9:22 AM)  No concerning Lymphadenopathy   Last Thyroid function tests and thyroglobulin and antibodies:     TSH (03/26/2025 4:42 PM).0.24 ( 1 year ago 0.10)  Thyroglobulin and Antibody (Sendout to [facility]) (03/26/2025 4:42 PM) < 0.1 and LOR < 0.4 ( stable)   T4 free (03/26/2025 4:42 PM) 1.1( 2.21 a year ago)    Current Levothyroxine dose :  200 mcg daily     ( changed in 03/2023)         Current weight: 102.1 kg    Compliance with levothyroxine: yes       Denies any significant changes in health or any hospitalisations since last visit.     Complaints if intermittently feeling hot at night. But she reports its not major.   She might have slight night sweats when she feels hot.   Her periods are predictable but have become longer.     Patient denies  noticing any new lumps/swelling in the neck, no change in weight, appetite.   Patient denies any hoarseness/ changes in voice, difficulty swallowing food, coughing, choking on food.  Patient denies any palpitations, tremors, diarrhoea, anxiety. Heat or cold  "intolerance.     Relevant meds  Current Outpatient Medications   Medication Sig Dispense Refill    Bacillus Coagulans-Inulin (ALIGN PREBIOTIC-PROBIOTIC PO)       clobetasol (TEMOVATE) 0.05 % external ointment Apply topically 2 times daily 60 g 11    levothyroxine (SYNTHROID/LEVOTHROID) 200 MCG tablet Take 1 tablet (200 mcg) by mouth daily. 90 tablet 4    loratadine (CLARITIN) 10 MG tablet Take 10 mg by mouth daily      multivitamin w/minerals (THERA-VIT-M) tablet Take 1 tablet by mouth daily      Omega-3 1000 MG capsule Take 1 g by mouth daily       No current facility-administered medications for this visit.              Physical Exam:     /70   Pulse 72   Ht 1.778 m (5' 10\")   Wt 103.9 kg (229 lb)   SpO2 98%   BMI 32.86 kg/m      GENERAL: no acute distress   SKIN: Visible skin clear.  Neck: faint thyroidectomy scar, no visible or palpable  neck masses  EYES: Eyes grossly normal to inspection.  RESP: No audible wheeze, cough, No visible retractions or increased work of breathing.    NEURO.  Awake, alert, responds appropriately to questions.  Mentation and speech fluent.  PSYCH: affect normal, and appearance well-groomed.             Data:   No results found for this or any previous visit (from the past 24 hours).       Latest Ref Rng 6/22/2020  12:00 AM 10/12/2020  11:41 AM 7/27/2021  11:04 AM 9/24/2021  3:17 PM 10/25/2021  8:12 AM 7/29/2022  12:20 PM 9/16/2022  12:16 PM   ENDO THYROID LABS-[facility]           TSH 0.40 - 4.00 mU/L 3.310 (E) 0.21 (L)  0.47  0.21 (L)  0.60  0.40     TSH 0.30 - 4.20 uIU/mL       1.45    T4 4.5 - 11.7 ug/dL  15.0 (H)   12.0  12.0   9.9    FREE T4 0.90 - 1.70 ng/dL  1.51 (H)  1.25    1.44        Latest Ref Rng 10/17/2022  2:13 PM 3/9/2023  8:18 AM 5/17/2023  8:15 AM 3/4/2024  8:56 AM 3/26/2025  4:42 PM   ENDO THYROID LABS-[facility]         TSH 0.40 - 4.00 mU/L 0.66        TSH 0.30 - 4.20 uIU/mL  1.26  0.40  0.10 (L)  0.24 (L)    T4 4.5 - 11.7 ug/dL 9.5  10.3       FREE T4 0.90 - 1.70 " ng/dL   1.99 (H)  2.21 (H)  1.81 (H)       Tumor marker trends :       4/11/14: Tg 1.8, LOR < 1, .7, PTH 42  Radioiodine 103 mCi  12/4/14: Tg 0.3, LOR < 1, TSH 21.8  8/1/16: Tg < 0.1, TSH 0.44  4/29/17: Tg 0.1, LOR < 1, TSH 0.27  9/14/18 Tg < 0.1, LOR < 1, TPO 32  9/6/19 Tg < 0.1, LOR < 1, TSH 0.23, free  T4 2.03  6/24/2020 Tg 0.1 (Access), LOR < 1 (Access), TSH 2.01, free T4 1.73  10/12/2020 Tg < 0.1, LOR < 0.4, TSh 0.21  7/27/2021 Tg < 0.1, LOR < 0.4, TSH 0.47, free T4 1.25.   7/29/2022 TSH 0.4, free T4 1.44, Tg < 0.1, LOR < 0.4 .  5/17/23 Tg < 0.1, LOR < 0.4, TSH 0.4, free T4 1.99- on lT4 200 mcg.day.  3/4/24 Tg < 0.1, LOR < 0.4, TSH 0.1, free T4 2.1     Bone labs   1/30/14 25OHD 18, PTH 29, Ca 9.4,   2/4/14 PTH 11  2/6/14 PTH 14  2/20/14: PTH 14, Ca 9.5  4/11/14: PTH 42  3/29/19: 25OHD 36    Pathology 2014          [facility]  Outside Information     Surgical Pathology Request    Narrative  Performed by DEPARTMENT OF PATHOLOGY, [facility]  ADDENDUM    ADDENDED REPORT (02/10/14): THIS ADDENDUM REPORT SUPERSEDES THE REPORT ISSUED 02/04/14. THE PURPOSE OF THIS ADDENDUM IS TO ADD THE FROZEN SECTION DIAGNOSIS.  THE FINAL DIAGNOSIS IS UNCHANGED.      FINAL DIAGNOSIS    A) Parathyroid, right, excisional biopsy:  - Thyroid tissue, no parathyroid present    B) Thyroid and lymph nodes, total thyroidectomy and central neck dissection:  - Papillary thyroid carcinoma of left lobe, maximum dimension 3.2 cm  - Surgical margins free of tumor; but tumor within 0.1 cm from inked margins  - Tumor does not spread to right lobe or isthmus  - Extrathyroid extension present  - Lymphovascular invasion not present  - Five of 21 lymph nodes positive for carcinoma (5/21);   largest tumor deposit 0.4 cm with no extranodal extension  - Remaining thyroid parenchyma with lymphocytic thyroiditis  - Please see microscopic description for synoptic report          CLINICAL HISTORY    28 year-old female with papillary thyroid  "carcinoma.        MICROSCOPIC DESCRIPTION    Synoptic Report:    Procedure: Total thyroidectomy  Received: In formalin  Specimen Integrity: Intact  Specimen Size:   Right lobe: 4.6 x 2.2 x 1.1 cm   Left lobe: 5.1 x 2.5 x 1.8 cm   Isthmus: 1.3 x 1.4 x 0.4 cm   Central compartment: 3.7 x 2.4 x 0.5 cm  Specimen Weight: 23g    Tumor Focality: Unifocal  Tumor Laterality: Left Lobe  Tumor Size   Greatest dimension: 3.2 cm   Additional dimensions: 1.6 x 1.6 cm  Histologic Type: Papillary carcinoma, Classical  Architecture: Classical  Cytomorphology: Classical  Histologic Grade:  G1: Well differentiated  Margins: Margins uninvolved by carcinoma  Distance of invasive carcinoma to closest margin: less than 1 mm  Tumor Capsule: None  Lymph-Vascular Invasion: Not identified  Perineural Invasion: Not identified  Extrathyroidal Extension: Present    Pathologic Staging (pTNM)  Primary Tumor (pT)   pT3: Focal extrathyroid extension  Regional Lymph Nodes (pN)   pN1: Positive   Number examined: 21   Number involved: 5   Largest size of tumor deposit: 0.4 cm   Extranodal extension: Not present  Distant Metastasis (pM): Not applicable    Additional Pathologic Findings: Lymphocytic thyroiditis      GROSS DESCRIPTION    A) The specimen is received fresh for intraoperative consultation labeled \"Kaitlyn Paul/right parathyroid\" and consists of one fragment of red-tan tissue measuring 0.2 x 0.1 x 0.1 cm. The entirety of the tissue is submitted for frozen section analysis which was interpreted as thyroid tissue. The entirety of the frozen section remnants are submitted for permanent analysis in cassette (FSA1).    B) The specimen is received in formalin labeled \"Kaitlyn Paul/thyroid and central neck dissection\" and consists of one thyroid measuring 6.0 x 5.3 x 1.8 cm. The right lobe measures 4.6 x 2.2 x 1.1 cm. The left lobe measures 5.1 x 2.5 x 1.8 cm. The isthmus measures 1.3 x 1.4 x 0.4 cm. The left lobe appears to have a " dominant cystic cavity measuring approximately 2.8 x 2.4 x 1.8 cm. No nodules are externally appreciated on the right lobe. The thyroid has stitches; however it is not indicated what the stitches designate. The thyroid weighs 23 g. The anterior is inked in green and the posterior is inked in purple. The thyroid is serially sectioned from right to left revealing homogenous dark red thyroid parenchyma within the right lobe. The isthmus was devoid of gross abnormality. The left lobe demonstrated an ill-defined nodule measuring approximately 3.2 x 1.6 by 1.6 cm. This nodule contains what appear to be thick hemorrhagic cavities as well as darker firm tan areas. There does not appear to be a capsule around the nodule or any part of it. The nodule is extremely friable. Representative sections of the right lobe and isthmus are submitted. The entirety of the left lobe  nodule is submitted. There is also a piece of red-tan soft tissue measuring 3.7 x 2.4 x 0.5 cm. There appears to be made up of lymph nodes and fibroconnective tissue. The tissue is thoroughly searched for lymph nodes and submitted in its entirety.    (B1-B4) Representative right lobe  (B5) Isthmus  (B6-B11) Entirety of left lobe nodule  (B12) Uninvolved left lobe parenchyma  (B13) 3 presumptive lymph nodes whole  (B14) 2 presumptive lymph nodes, whole  (B15) 3 presumptive lymph nodes, whole  (B16) 3 presumptive lymph nodes, whole  (B17) 2 presumptive lymph nodes, whole  LJP        INTRAOPERATIVE DIAGNOSIS    (FSA1) Right parathyroid, biopsy: thyroid tissue      Intra-Operative Diagnosis Reviewed and Interpreted By:    MD Ameena Henry MD      'I certify that (1) all services on this form were rendered and are hereby approved for billing, (2) all specimens/slides have been examined / reviewed, (3) the medical record has been documented for these services, and (4) the rendering of the services and the documentation in the medical  record are in accordance with UPI guidelines.'      Final Diagnosis Reviewed and Interpreted by:    ELECTRONICALLY SIGNED      Yareli William MD, PhD  Benjamin Mera MD    Pathologist - [id number]  Resident      Date:  02/10/2014              Surgical Pathology  [facility] Department of Pathology  [phone] (Phone)   [phone] (Fax)        Imaging :        Order  US head neck soft tissue [[id number]] (Order [id number])  Exam Information    Exam Date Exam Time Accession # Performing Department Results    3/11/24  9:22 AM [id number] [facility]      PACS Images     Show images for US head neck soft tissue     Study Result    Narrative & Impression   EXAMINATION: US HEAD NECK SOFT TISSUE, 3/11/2024 9:22 AM      COMPARISON: Neck ultrasound 9/24/2021     HISTORY: Papillary thyroid carcinoma (H); Post-surgical hypothyroidism     TECHNIQUE: Sonographic imaging performed of the neck to evaluate for  lymph nodes using grey scale and limited Doppler technique.     FINDINGS:     Lymph nodes are measured bilaterally with measurements given in  transverse, AP, and craniocaudal dimensions as follows:     Right:  Level 2: 1.1 x 0.8 x 2.5 cm oval, hypoechoic lymph node with normal  fatty hilum. 0.8 x 0.5 x 2.0 cm ovoid hypoechoic lymph node with  normal fatty hilum. These appear similar compared to prior.  Level 3: No nodes.  Level 4: No nodes.  Level 5: No nodes.  Level 6: No nodes or suspicious soft tissue within the thyroidectomy  bed  Level 7:No nodes.     Left:  Level 2: 1.2 x 0.6 x 1.5 cm ovoid, hypoechoic lymph node with normal  fatty hilum. 1.4 x 0.5 x 2.6 cm ovoid, hypoechoic lymph node with  normal fatty hilum. These appear similar compared to prior.  Level 3: No nodes.  Level 4: Rounded, hypoechoic, 0.7 x 0.5 x 0.6 cm node, unchanged  compared to prior.  Level 5: No nodes.  Level 6: No nodes or suspicious soft tissue within the thyroidectomy  bed.  Level 7:No  nodes.                                                                      IMPRESSION:  1.  Soft tissue neck ultrasound with lymph node measurements as  described above. No suspicious cervical lymphadenopathy or abnormal  soft tissue within the thyroidectomy bed.     I have personally reviewed the examination and initial interpretation  and I agree with the findings.     LUIS BRAXTON DO         SYSTEM ID:  [id number]            Assessment/Plan:     # Papillary thyroid carcinoma (classic variant), unifocal , focal extrathyroidal extension  # 5 out of 21 central neck nodes involved, 0.4 cm, no FRANKY  #Initial LOR intermediate risk   # TNM stage I  # Treated with total thyroidectomy and central neck dissection  # S/p 103 mCi CHIN in April 2014  Patient is now over 10 years from the initial diagnosis and is doing well.  There is currently no evidence(structural or biochemical) of recurrence of disease.     - Continue levothyroxine 200 mcg daily  -Repeat TSH free T4 and tumor markers every year.   - Ultrasound soft tissue neck in 5 years(due 2029), otherwise will keep following with tumor markers for now    #post surgical Hypothyroidism  # Treat to target TSH less than 0.4  # Currently at goal    -Continue levothyroxine 200 mcg daily  - Rpe  -Repeat TSH and free T4 and tumor markers every year and 2 weeks prior to visit in 2 years       Return to clinic in 2 years    Patient seen and management discussed with Dr. Ferdinand Driscoll MD  Endocrine Fellow  Pager # [phone]

## 2025-04-16 NOTE — LETTER
4/16/2025       RE: Kaitlyn Paul  [address] [address] [facility] [address]     Dear Colleague,    Thank you for referring your patient, Kaitlyn Paul, to the [facility] ENDO[facility]OLOG[facility] [place] [facility]. Please see a copy of my visit note below.    03/25/25 12:46 PM  PATIENT LAB/IMAGING STATUS : Appt scheduled / Day of appt 3/26/25         Endocrine Progress Note  Patient: Kaitlyn Paul   MRN: [id number]  Date of Service: 04/16/2025         Chief complaint/HPI:     39 year old female with history of classic variant papillary thyroid carcinoma (TNM stage I :pT3 pN1): 3.2 cm , unifocal, left lobe, focal extrathyroidal extension, negative tumor margins, 5 out of 21 lymph nodes positive, 0.4 cm, no FRANKY.  Treated with a total thyroidectomy central neck dissection in February 2014 followed by 103 mCi CHIN on April 11, 2014 at [facility].  Patient has done well with no evidence of structural or biochemical recurrence of disease.  Patient continues to be on TSH suppressive therapy with treat to target TSH less than 0.4 (currently on levothyroxine 200 mcg daily).          Interval History:       Last clinic visit: Progress Notes by Ghislaine Streeter MD (03/27/2024 1:00 PM)   Last soft tissue neck US: US head neck soft tissue (03/11/2024 9:22 AM)  No concerning Lymphadenopathy   Last Thyroid function tests and thyroglobulin and antibodies:     TSH (03/26/2025 4:42 PM).0.24 ( 1 year ago 0.10)  Thyroglobulin and Antibody (Sendout to [facility]) (03/26/2025 4:42 PM) < 0.1 and LOR < 0.4 ( stable)   T4 free (03/26/2025 4:42 PM) 1.1( 2.21 a year ago)    Current Levothyroxine dose :  200 mcg daily     ( changed in 03/2023)         Current weight: 102.1 kg    Compliance with levothyroxine: yes       Denies any significant changes in health or any hospitalisations since last visit.     Complaints if intermittently feeling hot at night. But  "she reports its not major.   She might have slight night sweats when she feels hot.   Her periods are predictable but have become longer.     Patient denies  noticing any new lumps/swelling in the neck, no change in weight, appetite.   Patient denies any hoarseness/ changes in voice, difficulty swallowing food, coughing, choking on food.  Patient denies any palpitations, tremors, diarrhoea, anxiety. Heat or cold intolerance.     Relevant meds  Current Outpatient Medications   Medication Sig Dispense Refill     Bacillus Coagulans-Inulin (ALIGN PREBIOTIC-PROBIOTIC PO)        clobetasol (TEMOVATE) 0.05 % external ointment Apply topically 2 times daily 60 g 11     levothyroxine (SYNTHROID/LEVOTHROID) 200 MCG tablet Take 1 tablet (200 mcg) by mouth daily. 90 tablet 4     loratadine (CLARITIN) 10 MG tablet Take 10 mg by mouth daily       multivitamin w/minerals (THERA-VIT-M) tablet Take 1 tablet by mouth daily       Omega-3 1000 MG capsule Take 1 g by mouth daily       No current facility-administered medications for this visit.              Physical Exam:     /70   Pulse 72   Ht 1.778 m (5' 10\")   Wt 103.9 kg (229 lb)   SpO2 98%   BMI 32.86 kg/m      GENERAL: no acute distress   SKIN: Visible skin clear.  Neck: faint thyroidectomy scar, no visible or palpable  neck masses  EYES: Eyes grossly normal to inspection.  RESP: No audible wheeze, cough, No visible retractions or increased work of breathing.    NEURO.  Awake, alert, responds appropriately to questions.  Mentation and speech fluent.  PSYCH: affect normal, and appearance well-groomed.             Data:   No results found for this or any previous visit (from the past 24 hours).       Latest Ref Rng 6/22/2020  12:00 AM 10/12/2020  11:41 AM 7/27/2021  11:04 AM 9/24/2021  3:17 PM 10/25/2021  8:12 AM 7/29/2022  12:20 PM 9/16/2022  12:16 PM   ENDO THYROID LABS-[facility]           TSH 0.40 - 4.00 mU/L 3.310 (E) 0.21 (L)  0.47  0.21 (L)  0.60  0.40     TSH 0.30 - 4.20 " uIU/mL       1.45    T4 4.5 - 11.7 ug/dL  15.0 (H)   12.0  12.0   9.9    FREE T4 0.90 - 1.70 ng/dL  1.51 (H)  1.25    1.44        Latest Ref Rng 10/17/2022  2:13 PM 3/9/2023  8:18 AM 5/17/2023  8:15 AM 3/4/2024  8:56 AM 3/26/2025  4:42 PM   ENDO THYROID LABS-[facility]         TSH 0.40 - 4.00 mU/L 0.66        TSH 0.30 - 4.20 uIU/mL  1.26  0.40  0.10 (L)  0.24 (L)    T4 4.5 - 11.7 ug/dL 9.5  10.3       FREE T4 0.90 - 1.70 ng/dL   1.99 (H)  2.21 (H)  1.81 (H)       Tumor marker trends :       4/11/14: Tg 1.8, LOR < 1, .7, PTH 42  Radioiodine 103 mCi  12/4/14: Tg 0.3, LOR < 1, TSH 21.8  8/1/16: Tg < 0.1, TSH 0.44  4/29/17: Tg 0.1, LOR < 1, TSH 0.27  9/14/18 Tg < 0.1, LOR < 1, TPO 32  9/6/19 Tg < 0.1, LOR < 1, TSH 0.23, free  T4 2.03  6/24/2020 Tg 0.1 (Access), LOR < 1 (Access), TSH 2.01, free T4 1.73  10/12/2020 Tg < 0.1, LOR < 0.4, TSh 0.21  7/27/2021 Tg < 0.1, LOR < 0.4, TSH 0.47, free T4 1.25.   7/29/2022 TSH 0.4, free T4 1.44, Tg < 0.1, LOR < 0.4 .  5/17/23 Tg < 0.1, LOR < 0.4, TSH 0.4, free T4 1.99- on lT4 200 mcg.day.  3/4/24 Tg < 0.1, LOR < 0.4, TSH 0.1, free T4 2.1     Bone labs   1/30/14 25OHD 18, PTH 29, Ca 9.4,   2/4/14 PTH 11  2/6/14 PTH 14  2/20/14: PTH 14, Ca 9.5  4/11/14: PTH 42  3/29/19: 25OHD 36    Pathology 2014          [facility]  Outside Information     Surgical Pathology Request    Narrative  Performed by DEPARTMENT OF PATHOLOGY, [facility]  ADDENDUM    ADDENDED REPORT (02/10/14): THIS ADDENDUM REPORT SUPERSEDES THE REPORT ISSUED 02/04/14. THE PURPOSE OF THIS ADDENDUM IS TO ADD THE FROZEN SECTION DIAGNOSIS.  THE FINAL DIAGNOSIS IS UNCHANGED.      FINAL DIAGNOSIS    A) Parathyroid, right, excisional biopsy:  - Thyroid tissue, no parathyroid present    B) Thyroid and lymph nodes, total thyroidectomy and central neck dissection:  - Papillary thyroid carcinoma of left lobe, maximum dimension 3.2 cm  - Surgical margins free of tumor; but tumor within 0.1 cm from inked margins  - Tumor does not  "spread to right lobe or isthmus  - Extrathyroid extension present  - Lymphovascular invasion not present  - Five of 21 lymph nodes positive for carcinoma (5/21);   largest tumor deposit 0.4 cm with no extranodal extension  - Remaining thyroid parenchyma with lymphocytic thyroiditis  - Please see microscopic description for synoptic report          CLINICAL HISTORY    28 year-old female with papillary thyroid carcinoma.        MICROSCOPIC DESCRIPTION    Synoptic Report:    Procedure: Total thyroidectomy  Received: In formalin  Specimen Integrity: Intact  Specimen Size:   Right lobe: 4.6 x 2.2 x 1.1 cm   Left lobe: 5.1 x 2.5 x 1.8 cm   Isthmus: 1.3 x 1.4 x 0.4 cm   Central compartment: 3.7 x 2.4 x 0.5 cm  Specimen Weight: 23g    Tumor Focality: Unifocal  Tumor Laterality: Left Lobe  Tumor Size   Greatest dimension: 3.2 cm   Additional dimensions: 1.6 x 1.6 cm  Histologic Type: Papillary carcinoma, Classical  Architecture: Classical  Cytomorphology: Classical  Histologic Grade:  G1: Well differentiated  Margins: Margins uninvolved by carcinoma  Distance of invasive carcinoma to closest margin: less than 1 mm  Tumor Capsule: None  Lymph-Vascular Invasion: Not identified  Perineural Invasion: Not identified  Extrathyroidal Extension: Present    Pathologic Staging (pTNM)  Primary Tumor (pT)   pT3: Focal extrathyroid extension  Regional Lymph Nodes (pN)   pN1: Positive   Number examined: 21   Number involved: 5   Largest size of tumor deposit: 0.4 cm   Extranodal extension: Not present  Distant Metastasis (pM): Not applicable    Additional Pathologic Findings: Lymphocytic thyroiditis      GROSS DESCRIPTION    A) The specimen is received fresh for intraoperative consultation labeled \"Kaitlyn Paul/right parathyroid\" and consists of one fragment of red-tan tissue measuring 0.2 x 0.1 x 0.1 cm. The entirety of the tissue is submitted for frozen section analysis which was interpreted as thyroid tissue. The entirety of " "the frozen section remnants are submitted for permanent analysis in cassette (FSA1).    B) The specimen is received in formalin labeled \"Kaitlyn Paul/thyroid and central neck dissection\" and consists of one thyroid measuring 6.0 x 5.3 x 1.8 cm. The right lobe measures 4.6 x 2.2 x 1.1 cm. The left lobe measures 5.1 x 2.5 x 1.8 cm. The isthmus measures 1.3 x 1.4 x 0.4 cm. The left lobe appears to have a dominant cystic cavity measuring approximately 2.8 x 2.4 x 1.8 cm. No nodules are externally appreciated on the right lobe. The thyroid has stitches; however it is not indicated what the stitches designate. The thyroid weighs 23 g. The anterior is inked in green and the posterior is inked in purple. The thyroid is serially sectioned from right to left revealing homogenous dark red thyroid parenchyma within the right lobe. The isthmus was devoid of gross abnormality. The left lobe demonstrated an ill-defined nodule measuring approximately 3.2 x 1.6 by 1.6 cm. This nodule contains what appear to be thick hemorrhagic cavities as well as darker firm tan areas. There does not appear to be a capsule around the nodule or any part of it. The nodule is extremely friable. Representative sections of the right lobe and isthmus are submitted. The entirety of the left lobe  nodule is submitted. There is also a piece of red-tan soft tissue measuring 3.7 x 2.4 x 0.5 cm. There appears to be made up of lymph nodes and fibroconnective tissue. The tissue is thoroughly searched for lymph nodes and submitted in its entirety.    (B1-B4) Representative right lobe  (B5) Isthmus  (B6-B11) Entirety of left lobe nodule  (B12) Uninvolved left lobe parenchyma  (B13) 3 presumptive lymph nodes whole  (B14) 2 presumptive lymph nodes, whole  (B15) 3 presumptive lymph nodes, whole  (B16) 3 presumptive lymph nodes, whole  (B17) 2 presumptive lymph nodes, whole  LJP        INTRAOPERATIVE DIAGNOSIS    (FSA1) Right parathyroid, biopsy: thyroid " tissue      Intra-Operative Diagnosis Reviewed and Interpreted By:    MD Ameena Henry MD      'I certify that (1) all services on this form were rendered and are hereby approved for billing, (2) all specimens/slides have been examined / reviewed, (3) the medical record has been documented for these services, and (4) the rendering of the services and the documentation in the medical record are in accordance with UPI guidelines.'      Final Diagnosis Reviewed and Interpreted by:    ELECTRONICALLY SIGNED      Yareli William MD, PhD  Benjamin Mera MD    Pathologist - [id number]  Resident      Date:  02/10/2014              Surgical Pathology  [facility] Department of Pathology  [phone] (Phone)   [phone] (Fax)        Imaging :        Order  US head neck soft tissue [[id number]] (Order [id number])  Exam Information    Exam Date Exam Time Accession # Performing Department Results    3/11/24  9:22 AM [id number] [facility]      PACS Images     Show images for US head neck soft tissue     Study Result    Narrative & Impression   EXAMINATION: US HEAD NECK SOFT TISSUE, 3/11/2024 9:22 AM      COMPARISON: Neck ultrasound 9/24/2021     HISTORY: Papillary thyroid carcinoma (H); Post-surgical hypothyroidism     TECHNIQUE: Sonographic imaging performed of the neck to evaluate for  lymph nodes using grey scale and limited Doppler technique.     FINDINGS:     Lymph nodes are measured bilaterally with measurements given in  transverse, AP, and craniocaudal dimensions as follows:     Right:  Level 2: 1.1 x 0.8 x 2.5 cm oval, hypoechoic lymph node with normal  fatty hilum. 0.8 x 0.5 x 2.0 cm ovoid hypoechoic lymph node with  normal fatty hilum. These appear similar compared to prior.  Level 3: No nodes.  Level 4: No nodes.  Level 5: No nodes.  Level 6: No nodes or suspicious soft tissue within the thyroidectomy  bed  Level 7:No nodes.     Left:  Level  2: 1.2 x 0.6 x 1.5 cm ovoid, hypoechoic lymph node with normal  fatty hilum. 1.4 x 0.5 x 2.6 cm ovoid, hypoechoic lymph node with  normal fatty hilum. These appear similar compared to prior.  Level 3: No nodes.  Level 4: Rounded, hypoechoic, 0.7 x 0.5 x 0.6 cm node, unchanged  compared to prior.  Level 5: No nodes.  Level 6: No nodes or suspicious soft tissue within the thyroidectomy  bed.  Level 7:No nodes.                                                                      IMPRESSION:  1.  Soft tissue neck ultrasound with lymph node measurements as  described above. No suspicious cervical lymphadenopathy or abnormal  soft tissue within the thyroidectomy bed.     I have personally reviewed the examination and initial interpretation  and I agree with the findings.     LUIS BRAXTON DO         SYSTEM ID:  [id number]            Assessment/Plan:     # Papillary thyroid carcinoma (classic variant), unifocal , focal extrathyroidal extension  # 5 out of 21 central neck nodes involved, 0.4 cm, no FRANKY  #Initial LOR intermediate risk   # TNM stage I  # Treated with total thyroidectomy and central neck dissection  # S/p 103 mCi CHIN in April 2014  Patient is now over 10 years from the initial diagnosis and is doing well.  There is currently no evidence(structural or biochemical) of recurrence of disease.     - Continue levothyroxine 200 mcg daily  -Repeat TSH free T4 and tumor markers every year.   - Ultrasound soft tissue neck in 5 years(due 2029), otherwise will keep following with tumor markers for now    #post surgical Hypothyroidism  # Treat to target TSH less than 0.4  # Currently at goal    -Continue levothyroxine 200 mcg daily  - Rpe  -Repeat TSH and free T4 and tumor markers every year and 2 weeks prior to visit in 2 years       Return to clinic in 2 years    Patient seen and management discussed with Dr. Ferdinand Driscoll MD  Endocrine Fellow  Pager # [phone]           Attending tie-in statement: Patient  seen and examined by me, discussed with fellow whose note I have reviewed and with which I agree.  Key elements and diagnostic points include the followin.  History of Papillary thyroid carcinoma, 3.2 cm left with ETE, + 5 LN positive. Treatment was  near total Tx and CND, 103 mCi 131.   We are now 11 years post diagnosis with AMANDA  Labs every 2 years to include Tg .  See me in 2 years   Next neck US     2.  Post surgical hypothyroidism.  Treat to target TSH <1.  on LT4  200 mcg/day .    Refilled LT4    8 minutes spent on the date of the encounter doing chart review, history and exam, documentation and further activities as noted above.  This doesn't include the time in the exam room with Dr Driscoll, where the time shows up on his timer, not mine.      The Longitudinal plan of care for postsurgical hypothyroidism related to thyroid cancer/thyroid cancer history was addressed during this visit. Due to added complexity of care, we will continue to support Kaitlyn , and the subsequent management of this condition(s) and with the ongoing continuity of care of this condition.    Ghislaine Streeter MD      Chief complaint: HISTORY OF PRESENT ILLNESS    Celeste presents for follow up of papillary thyroid carcinoma, post surgical hypothyroidism.  I last saw her 3/2024. At that time she was on LT4  200 mcg/day and she continues on this.  She already had labs in anticipation of this appt.     The thyroid cancer treatment has been as follows:   14 Near total thyroidectomy with central lymph node dissection by Dr Sandra Mims at .  3.2 cm PTC left with ETE but no LV invasion.   LN were positive , larges tumor deposit in LN 0.4 cm.    14 103 mCi 131I    Selected  labs  14 PTH 11  14 PTH 14  14: Tg 1.8, LOR < 1, .7, PTH 42  Radioiodine 103 mCi  14: Tg 0.3, LOR < 1, TSH 21.8  18 Tg < 0.1, LOR < 1, TPO 32  10/12/2020 Tg < 0.1, LOR < 0.4, TSh 0.21  2021 Tg < 0.1, LOR <  0.4, TSH 0.47, free T4 1.25.   2022 TSH 0.4, free T4 1.44, Tg < 0.1, LOR < 0.4 .  23 Tg < 0.1, LOR < 0.4, TSH 0.4, free T4 1.99- on lT4 200 mcg.day.  3/4/24 Tg < 0.1, LOR < 0.4, TSH 0.1, free T4 2.1   3/26/25: Tg < 0.1, LOR < 0.4 , TSH 0.24, free T4 1.81 -     Imaging  13 CT Doc  13 US neck  14 103 mCi 131I  14 131I post therapy TBS: negative  14 4.06 mCi 123I TBS: negative  16 US neck  17 US thyroid  18 US neck   19 thyroid US [facility]-- we do not have the images from this study, only the report is viewable on care everywhere   3/11/24 neck US compared with 2021,  2020  as read by me again today.    Left level 4 #1  0.5 x 0.7 x 0.6 cm  was 0.7 x 0.5 x 0.6 cm  was 0.6 x 0.5 x 0.6 cm     Past Medical History  Past Medical History:   Diagnosis Date     Arthritis      Dry eye      Encounter for IUD insertion 2020    Mirena inserted by Dr Barnett     Encounter for IUD removal 2018    [id number] and then again [id number]     History of recurrent miscarriages     x 4     Missed  10/2022     Papillary thyroid carcinoma (H) 2014     Post-surgical hypothyroidism 2014     Past Surgical History:   Procedure Laterality Date     central neck dissection  2014     D & C      2018, 2019, 2020     HC KNEE SCOPE,MED/LAT MENISECTOMY       HC REMOVAL GALLBLADDER      lap      IR THYROID BIOPSY  2014     near total thyroidectomy  2014     Z NONSPECIFIC PROCEDURE      lipoma excision from her back     ZZC REPAIR CRUCIATE LIGAMENT,KNEE      left       Medications  Current Outpatient Medications   Medication Sig Dispense Refill     Bacillus Coagulans-Inulin (ALIGN PREBIOTIC-PROBIOTIC PO)        clobetasol (TEMOVATE) 0.05 % external ointment Apply topically 2 times daily 60 g 11     levothyroxine (SYNTHROID/LEVOTHROID) 200 MCG tablet Take 1 tablet (200 mcg) by mouth daily. 90 tablet 4     loratadine (CLARITIN) 10  "MG tablet Take 10 mg by mouth daily       multivitamin w/minerals (THERA-VIT-M) tablet Take 1 tablet by mouth daily       Omega-3 1000 MG capsule Take 1 g by mouth daily       Allergies  Allergies   Allergen Reactions     Sulfa Antibiotics      Rash as a child     Family History  Family History   Problem Relation Age of Onset     Graves' disease Mother         eye diseease     Hypothyroidism Mother         post surgical     Diabetes Father      No Known Problems Sister      No Known Problems Brother      Cerebrovascular Disease Maternal Grandmother      Cardiovascular Maternal Grandmother      Colon Cancer Maternal Grandmother      Cardiovascular Maternal Grandfather      Gastrointestinal Disease Paternal Grandmother         celiac     Cardiovascular Paternal Grandfather      No Known Problems Other      Thyroid Cancer No family hx of        Social History  Social History     Tobacco Use     Smoking status: Never     Smokeless tobacco: Never   Vaping Use     Vaping status: Never Used   Substance Use Topics     Alcohol use: No     Drug use: No     Physical Exam  GENERAL no mask; NAD  /70   Pulse 72   Ht 1.778 m (5' 10\")   Wt 103.9 kg (229 lb)   SpO2 98%   BMI 32.86 kg/m    SKIN: normal color, temperature, texture   HEENT: PER, no scleral icterus  NECK: supple.  No visible or palpable neck masses, cervical adenopathy,or goiter.   LUNGS: clear to auscultation bilaterally.   CARDIAC: RRR, S1, S2 without murmurs, rubs or gallops.    BACK: normal spinal contour.    NEURO: Alert, responds appropriately to questions,  moves all extremities, DTRs 0/4, gait normal, trace tremor of the outstretched hand    DATA REVIEW     Latest Ref Rn 5/17/2023  8:15 AM 3/4/2024  8:56 AM 3/26/2025  4:42 PM   ENDO THYROID LABS-[facility]       TSH 0.30 - 4.20 uIU/mL 0.40  0.10 (L)  0.24 (L)    T4 4.5 - 11.7 ug/dL      FREE T4 0.90 - 1.70 ng/dL 1.99 (H)  2.21 (H)  1.81 (H)             Again, thank you for allowing me to participate in the " care of your patient.      Sincerely,    Ghislaine Streeter MD

## 2025-04-16 NOTE — PROGRESS NOTES
Attending tie-in statement: Patient seen and examined by me, discussed with fellow whose note I have reviewed and with which I agree.  Key elements and diagnostic points include the followin.  History of Papillary thyroid carcinoma, 3.2 cm left with ETE, + 5 LN positive. Treatment was  near total Tx and CND, 103 mCi 131.   We are now 11 years post diagnosis with AMANDA  Labs every 2 years to include Tg .  See me in 2 years   Next neck US     2.  Post surgical hypothyroidism.  Treat to target TSH <1.  on LT4  200 mcg/day .    Refilled LT4    8 minutes spent on the date of the encounter doing chart review, history and exam, documentation and further activities as noted above.  This doesn't include the time in the exam room with Dr Driscoll, where the time shows up on his timer, not mine.      The Longitudinal plan of care for postsurgical hypothyroidism related to thyroid cancer/thyroid cancer history was addressed during this visit. Due to added complexity of care, we will continue to support Kaitlyn , and the subsequent management of this condition(s) and with the ongoing continuity of care of this condition.    Ghislaine Streeter MD      Chief complaint: HISTORY OF PRESENT ILLNESS    Celeste presents for follow up of papillary thyroid carcinoma, post surgical hypothyroidism.  I last saw her 3/2024. At that time she was on LT4  200 mcg/day and she continues on this.  She already had labs in anticipation of this appt.     The thyroid cancer treatment has been as follows:   14 Near total thyroidectomy with central lymph node dissection by Dr Sandra Mims at .  3.2 cm PTC left with ETE but no LV invasion.   LN were positive , larges tumor deposit in LN 0.4 cm.    14 103 mCi 131I    Selected  labs  14 PTH 11  14 PTH 14  14: Tg 1.8, LOR < 1, .7, PTH 42  Radioiodine 103 mCi  14: Tg 0.3, LOR < 1, TSH 21.8  18 Tg < 0.1, LOR < 1, TPO 32  10/12/2020 Tg < 0.1, LOR < 0.4,  TSh 0.21  2021 Tg < 0.1, LOR < 0.4, TSH 0.47, free T4 1.25.   2022 TSH 0.4, free T4 1.44, Tg < 0.1, LOR < 0.4 .  23 Tg < 0.1, LOR < 0.4, TSH 0.4, free T4 1.99- on lT4 200 mcg.day.  3/4/24 Tg < 0.1, LOR < 0.4, TSH 0.1, free T4 2.1   3/26/25: Tg < 0.1, LOR < 0.4 , TSH 0.24, free T4 1.81 -     Imaging  13 CT Doc  13 US neck  14 103 mCi 131I  14 131I post therapy TBS: negative  14 4.06 mCi 123I TBS: negative  16 US neck  17 US thyroid  18 US neck   19 thyroid US [facility]-- we do not have the images from this study, only the report is viewable on care everywhere   3/11/24 neck US compared with 2021,  2020  as read by me again today.    Left level 4 #1  0.5 x 0.7 x 0.6 cm  was 0.7 x 0.5 x 0.6 cm  was 0.6 x 0.5 x 0.6 cm     Past Medical History  Past Medical History:   Diagnosis Date    Arthritis     Dry eye     Encounter for IUD insertion 2020    Mirena inserted by Dr Barnett    Encounter for IUD removal 2018-2015 and then again [id number]    History of recurrent miscarriages     x 4    Missed  10/2022    Papillary thyroid carcinoma (H) 2014    Post-surgical hypothyroidism 2014     Past Surgical History:   Procedure Laterality Date    central neck dissection  2014    D & C      2018, 2019, 2020    HC KNEE SCOPE,MED/LAT MENISECTOMY  2009    HC REMOVAL GALLBLADDER      lap     IR THYROID BIOPSY  2014    near total thyroidectomy  2014    Z NONSPECIFIC PROCEDURE  2007    lipoma excision from her back    ZZ REPAIR CRUCIATE LIGAMENT,KNEE      left       Medications  Current Outpatient Medications   Medication Sig Dispense Refill    Bacillus Coagulans-Inulin (ALIGN PREBIOTIC-PROBIOTIC PO)       clobetasol (TEMOVATE) 0.05 % external ointment Apply topically 2 times daily 60 g 11    levothyroxine (SYNTHROID/LEVOTHROID) 200 MCG tablet Take 1 tablet (200 mcg) by mouth daily. 90 tablet 4    loratadine  "(CLARITIN) 10 MG tablet Take 10 mg by mouth daily      multivitamin w/minerals (THERA-VIT-M) tablet Take 1 tablet by mouth daily      Omega-3 1000 MG capsule Take 1 g by mouth daily       Allergies  Allergies   Allergen Reactions    Sulfa Antibiotics      Rash as a child     Family History  Family History   Problem Relation Age of Onset    Graves' disease Mother         eye diseease    Hypothyroidism Mother         post surgical    Diabetes Father     No Known Problems Sister     No Known Problems Brother     Cerebrovascular Disease Maternal Grandmother     Cardiovascular Maternal Grandmother     Colon Cancer Maternal Grandmother     Cardiovascular Maternal Grandfather     Gastrointestinal Disease Paternal Grandmother         celiac    Cardiovascular Paternal Grandfather     No Known Problems Other     Thyroid Cancer No family hx of        Social History  Social History     Tobacco Use    Smoking status: Never    Smokeless tobacco: Never   Vaping Use    Vaping status: Never Used   Substance Use Topics    Alcohol use: No    Drug use: No     Physical Exam  GENERAL no mask; NAD  /70   Pulse 72   Ht 1.778 m (5' 10\")   Wt 103.9 kg (229 lb)   SpO2 98%   BMI 32.86 kg/m    SKIN: normal color, temperature, texture   HEENT: PER, no scleral icterus  NECK: supple.  No visible or palpable neck masses, cervical adenopathy,or goiter.   LUNGS: clear to auscultation bilaterally.   CARDIAC: RRR, S1, S2 without murmurs, rubs or gallops.    BACK: normal spinal contour.    NEURO: Alert, responds appropriately to questions,  moves all extremities, DTRs 0/4, gait normal, trace tremor of the outstretched hand    DATA REVIEW     Latest Ref Rng 5/17/2023  8:15 AM 3/4/2024  8:56 AM 3/26/2025  4:42 PM   ENDO THYROID LABS-[facility]       TSH 0.30 - 4.20 uIU/mL 0.40  0.10 (L)  0.24 (L)    T4 4.5 - 11.7 ug/dL      FREE T4 0.90 - 1.70 ng/dL 1.99 (H)  2.21 (H)  1.81 (H)           "

## 2025-04-16 NOTE — PATIENT INSTRUCTIONS
We will reduce the frequency of follow up for thyroid cancer to every 2 years    Continue labs every year     Neck neck US 2029    Re-ordered levothyroxine 200 mcg/day, # 90, 4 refills

## 2025-08-09 SDOH — HEALTH STABILITY: PHYSICAL HEALTH: ON AVERAGE, HOW MANY DAYS PER WEEK DO YOU ENGAGE IN MODERATE TO STRENUOUS EXERCISE (LIKE A BRISK WALK)?: 2 DAYS

## 2025-08-09 SDOH — HEALTH STABILITY: PHYSICAL HEALTH: ON AVERAGE, HOW MANY MINUTES DO YOU ENGAGE IN EXERCISE AT THIS LEVEL?: 30 MIN

## 2025-08-09 ASSESSMENT — SOCIAL DETERMINANTS OF HEALTH (SDOH): HOW OFTEN DO YOU GET TOGETHER WITH FRIENDS OR RELATIVES?: ONCE A WEEK

## 2025-08-11 ENCOUNTER — OFFICE VISIT (OUTPATIENT)
Dept: FAMILY MEDICINE | Facility: CLINIC | Age: 40
End: 2025-08-11
Payer: COMMERCIAL

## 2025-08-11 VITALS
HEIGHT: 70 IN | WEIGHT: 232.4 LBS | DIASTOLIC BLOOD PRESSURE: 74 MMHG | TEMPERATURE: 98.3 F | HEART RATE: 57 BPM | SYSTOLIC BLOOD PRESSURE: 111 MMHG | OXYGEN SATURATION: 97 % | RESPIRATION RATE: 20 BRPM | BODY MASS INDEX: 33.27 KG/M2

## 2025-08-11 DIAGNOSIS — Z13.6 SCREENING FOR CARDIOVASCULAR CONDITION: ICD-10-CM

## 2025-08-11 DIAGNOSIS — Z85.850 HISTORY OF THYROID CANCER: ICD-10-CM

## 2025-08-11 DIAGNOSIS — F41.9 ANXIETY: ICD-10-CM

## 2025-08-11 DIAGNOSIS — E66.09 CLASS 1 OBESITY DUE TO EXCESS CALORIES WITHOUT SERIOUS COMORBIDITY WITH BODY MASS INDEX (BMI) OF 33.0 TO 33.9 IN ADULT: ICD-10-CM

## 2025-08-11 DIAGNOSIS — E89.0 POST-SURGICAL HYPOTHYROIDISM: ICD-10-CM

## 2025-08-11 DIAGNOSIS — Z13.1 SCREENING FOR DIABETES MELLITUS (DM): ICD-10-CM

## 2025-08-11 DIAGNOSIS — Z00.00 ROUTINE GENERAL MEDICAL EXAMINATION AT A HEALTH CARE FACILITY: Primary | ICD-10-CM

## 2025-08-11 DIAGNOSIS — E66.811 CLASS 1 OBESITY DUE TO EXCESS CALORIES WITHOUT SERIOUS COMORBIDITY WITH BODY MASS INDEX (BMI) OF 33.0 TO 33.9 IN ADULT: ICD-10-CM

## 2025-08-11 PROBLEM — C73 PAPILLARY THYROID CARCINOMA (H): Status: RESOLVED | Noted: 2024-03-27 | Resolved: 2025-08-11

## 2025-08-11 LAB
EST. AVERAGE GLUCOSE BLD GHB EST-MCNC: 100 MG/DL
HBA1C MFR BLD: 5.1 % (ref 0–5.6)

## 2025-08-11 PROCEDURE — 3074F SYST BP LT 130 MM HG: CPT | Performed by: PHYSICIAN ASSISTANT

## 2025-08-11 PROCEDURE — 83036 HEMOGLOBIN GLYCOSYLATED A1C: CPT | Performed by: PHYSICIAN ASSISTANT

## 2025-08-11 PROCEDURE — 3044F HG A1C LEVEL LT 7.0%: CPT | Performed by: PHYSICIAN ASSISTANT

## 2025-08-11 PROCEDURE — 99396 PREV VISIT EST AGE 40-64: CPT | Performed by: PHYSICIAN ASSISTANT

## 2025-08-11 PROCEDURE — 99214 OFFICE O/P EST MOD 30 MIN: CPT | Mod: 25 | Performed by: PHYSICIAN ASSISTANT

## 2025-08-11 PROCEDURE — 3078F DIAST BP <80 MM HG: CPT | Performed by: PHYSICIAN ASSISTANT

## 2025-08-11 PROCEDURE — 36415 COLL VENOUS BLD VENIPUNCTURE: CPT | Performed by: PHYSICIAN ASSISTANT

## 2025-08-11 PROCEDURE — 1126F AMNT PAIN NOTED NONE PRSNT: CPT | Performed by: PHYSICIAN ASSISTANT

## 2025-08-11 PROCEDURE — 80061 LIPID PANEL: CPT | Performed by: PHYSICIAN ASSISTANT

## 2025-08-11 RX ORDER — FLUOXETINE 10 MG/1
10 CAPSULE ORAL DAILY
Qty: 90 CAPSULE | Refills: 0 | Status: SHIPPED | OUTPATIENT
Start: 2025-08-11

## 2025-08-11 ASSESSMENT — ANXIETY QUESTIONNAIRES
GAD7 TOTAL SCORE: 15
1. FEELING NERVOUS, ANXIOUS, OR ON EDGE: NEARLY EVERY DAY
3. WORRYING TOO MUCH ABOUT DIFFERENT THINGS: NEARLY EVERY DAY
GAD7 TOTAL SCORE: 15
GAD7 TOTAL SCORE: 15
IF YOU CHECKED OFF ANY PROBLEMS ON THIS QUESTIONNAIRE, HOW DIFFICULT HAVE THESE PROBLEMS MADE IT FOR YOU TO DO YOUR WORK, TAKE CARE OF THINGS AT HOME, OR GET ALONG WITH OTHER PEOPLE: VERY DIFFICULT
6. BECOMING EASILY ANNOYED OR IRRITABLE: MORE THAN HALF THE DAYS
1. FEELING NERVOUS, ANXIOUS, OR ON EDGE: NEARLY EVERY DAY
5. BEING SO RESTLESS THAT IT IS HARD TO SIT STILL: SEVERAL DAYS
2. NOT BEING ABLE TO STOP OR CONTROL WORRYING: MORE THAN HALF THE DAYS
IF YOU CHECKED OFF ANY PROBLEMS ON THIS QUESTIONNAIRE, HOW DIFFICULT HAVE THESE PROBLEMS MADE IT FOR YOU TO DO YOUR WORK, TAKE CARE OF THINGS AT HOME, OR GET ALONG WITH OTHER PEOPLE: VERY DIFFICULT
3. WORRYING TOO MUCH ABOUT DIFFERENT THINGS: NEARLY EVERY DAY
7. FEELING AFRAID AS IF SOMETHING AWFUL MIGHT HAPPEN: SEVERAL DAYS
6. BECOMING EASILY ANNOYED OR IRRITABLE: MORE THAN HALF THE DAYS
5. BEING SO RESTLESS THAT IT IS HARD TO SIT STILL: SEVERAL DAYS
2. NOT BEING ABLE TO STOP OR CONTROL WORRYING: MORE THAN HALF THE DAYS
7. FEELING AFRAID AS IF SOMETHING AWFUL MIGHT HAPPEN: SEVERAL DAYS

## 2025-08-11 ASSESSMENT — PAIN SCALES - GENERAL: PAINLEVEL_OUTOF10: NO PAIN (0)

## 2025-08-11 ASSESSMENT — PATIENT HEALTH QUESTIONNAIRE - PHQ9
5. POOR APPETITE OR OVEREATING: NEARLY EVERY DAY
5. POOR APPETITE OR OVEREATING: NEARLY EVERY DAY

## 2025-08-12 ENCOUNTER — OFFICE VISIT (OUTPATIENT)
Dept: OBGYN | Facility: CLINIC | Age: 40
End: 2025-08-12
Payer: COMMERCIAL

## 2025-08-12 ENCOUNTER — ANCILLARY PROCEDURE (OUTPATIENT)
Dept: MAMMOGRAPHY | Facility: CLINIC | Age: 40
End: 2025-08-12
Payer: COMMERCIAL

## 2025-08-12 VITALS
BODY MASS INDEX: 33.5 KG/M2 | WEIGHT: 234 LBS | DIASTOLIC BLOOD PRESSURE: 70 MMHG | HEIGHT: 70 IN | SYSTOLIC BLOOD PRESSURE: 118 MMHG

## 2025-08-12 DIAGNOSIS — Z01.419 ENCOUNTER FOR GYNECOLOGICAL EXAMINATION WITHOUT ABNORMAL FINDING: Primary | ICD-10-CM

## 2025-08-12 DIAGNOSIS — Z12.31 VISIT FOR SCREENING MAMMOGRAM: ICD-10-CM

## 2025-08-12 PROBLEM — Z90.89 HISTORY OF THYROIDECTOMY: Status: ACTIVE | Noted: 2018-07-04

## 2025-08-12 PROBLEM — S99.919A ANKLE INJURY: Status: ACTIVE | Noted: 2022-10-22

## 2025-08-12 PROBLEM — Z98.890 HISTORY OF THYROIDECTOMY: Status: ACTIVE | Noted: 2018-07-04

## 2025-08-12 LAB
CHOLEST SERPL-MCNC: 197 MG/DL
FASTING STATUS PATIENT QL REPORTED: ABNORMAL
HDLC SERPL-MCNC: 43 MG/DL
LDLC SERPL CALC-MCNC: 110 MG/DL
NONHDLC SERPL-MCNC: 154 MG/DL
TRIGL SERPL-MCNC: 220 MG/DL

## 2025-08-12 PROCEDURE — 77063 BREAST TOMOSYNTHESIS BI: CPT | Mod: TC | Performed by: RADIOLOGY

## 2025-08-12 PROCEDURE — 87624 HPV HI-RISK TYP POOLED RSLT: CPT | Performed by: OBSTETRICS & GYNECOLOGY

## 2025-08-12 PROCEDURE — 99396 PREV VISIT EST AGE 40-64: CPT | Performed by: OBSTETRICS & GYNECOLOGY

## 2025-08-12 PROCEDURE — 77067 SCR MAMMO BI INCL CAD: CPT | Mod: TC | Performed by: RADIOLOGY

## 2025-08-12 PROCEDURE — 3078F DIAST BP <80 MM HG: CPT | Performed by: OBSTETRICS & GYNECOLOGY

## 2025-08-12 PROCEDURE — 3074F SYST BP LT 130 MM HG: CPT | Performed by: OBSTETRICS & GYNECOLOGY

## 2025-08-12 ASSESSMENT — ANXIETY QUESTIONNAIRES
1. FEELING NERVOUS, ANXIOUS, OR ON EDGE: SEVERAL DAYS
3. WORRYING TOO MUCH ABOUT DIFFERENT THINGS: MORE THAN HALF THE DAYS
5. BEING SO RESTLESS THAT IT IS HARD TO SIT STILL: SEVERAL DAYS
GAD7 TOTAL SCORE: 10
IF YOU CHECKED OFF ANY PROBLEMS ON THIS QUESTIONNAIRE, HOW DIFFICULT HAVE THESE PROBLEMS MADE IT FOR YOU TO DO YOUR WORK, TAKE CARE OF THINGS AT HOME, OR GET ALONG WITH OTHER PEOPLE: VERY DIFFICULT
GAD7 TOTAL SCORE: 10
6. BECOMING EASILY ANNOYED OR IRRITABLE: MORE THAN HALF THE DAYS
2. NOT BEING ABLE TO STOP OR CONTROL WORRYING: SEVERAL DAYS
7. FEELING AFRAID AS IF SOMETHING AWFUL MIGHT HAPPEN: SEVERAL DAYS

## 2025-08-12 ASSESSMENT — PATIENT HEALTH QUESTIONNAIRE - PHQ9
SUM OF ALL RESPONSES TO PHQ QUESTIONS 1-9: 3
5. POOR APPETITE OR OVEREATING: MORE THAN HALF THE DAYS

## 2025-08-13 LAB
HPV HR 12 DNA CVX QL NAA+PROBE: NEGATIVE
HPV16 DNA CVX QL NAA+PROBE: NEGATIVE
HPV18 DNA CVX QL NAA+PROBE: NEGATIVE
HUMAN PAPILLOMA VIRUS FINAL DIAGNOSIS: NORMAL

## 2025-08-18 LAB
BKR AP ASSOCIATED HPV REPORT: NORMAL
BKR LAB AP GYN ADEQUACY: NORMAL
BKR LAB AP GYN INTERPRETATION: NORMAL
BKR LAB AP LMP: NORMAL
BKR LAB AP PREVIOUS ABNORMAL: NORMAL
PATH REPORT.COMMENTS IMP SPEC: NORMAL
PATH REPORT.COMMENTS IMP SPEC: NORMAL
PATH REPORT.RELEVANT HX SPEC: NORMAL

## (undated) RX ORDER — LIDOCAINE HYDROCHLORIDE 5 MG/ML
INJECTION, SOLUTION INFILTRATION; INTRAVENOUS
Status: DISPENSED
Start: 2024-04-22

## (undated) RX ORDER — TRIAMCINOLONE ACETONIDE 40 MG/ML
INJECTION, SUSPENSION INTRA-ARTICULAR; INTRAMUSCULAR
Status: DISPENSED
Start: 2021-12-13

## (undated) RX ORDER — TRIAMCINOLONE ACETONIDE 40 MG/ML
INJECTION, SUSPENSION INTRA-ARTICULAR; INTRAMUSCULAR
Status: DISPENSED
Start: 2024-04-22

## (undated) RX ORDER — TRIAMCINOLONE ACETONIDE 40 MG/ML
INJECTION, SUSPENSION INTRA-ARTICULAR; INTRAMUSCULAR
Status: DISPENSED
Start: 2023-01-23

## (undated) RX ORDER — LIDOCAINE HYDROCHLORIDE 5 MG/ML
INJECTION, SOLUTION INFILTRATION; INTRAVENOUS
Status: DISPENSED
Start: 2023-01-23

## (undated) RX ORDER — LIDOCAINE HYDROCHLORIDE 5 MG/ML
INJECTION, SOLUTION INFILTRATION; INTRAVENOUS
Status: DISPENSED
Start: 2021-12-13